# Patient Record
Sex: FEMALE | Race: BLACK OR AFRICAN AMERICAN | Employment: FULL TIME | URBAN - METROPOLITAN AREA
[De-identification: names, ages, dates, MRNs, and addresses within clinical notes are randomized per-mention and may not be internally consistent; named-entity substitution may affect disease eponyms.]

---

## 2017-02-20 ENCOUNTER — OFFICE VISIT (OUTPATIENT)
Dept: FAMILY MEDICINE CLINIC | Age: 52
End: 2017-02-20

## 2017-02-20 VITALS
RESPIRATION RATE: 18 BRPM | SYSTOLIC BLOOD PRESSURE: 130 MMHG | DIASTOLIC BLOOD PRESSURE: 72 MMHG | HEART RATE: 65 BPM | BODY MASS INDEX: 37.27 KG/M2 | OXYGEN SATURATION: 98 % | WEIGHT: 231.9 LBS | HEIGHT: 66 IN | TEMPERATURE: 98 F

## 2017-02-20 DIAGNOSIS — E11.9 CONTROLLED TYPE 2 DIABETES MELLITUS WITHOUT COMPLICATION, WITHOUT LONG-TERM CURRENT USE OF INSULIN (HCC): Primary | ICD-10-CM

## 2017-02-20 DIAGNOSIS — M54.9 OTHER CHRONIC BACK PAIN: ICD-10-CM

## 2017-02-20 DIAGNOSIS — G89.29 OTHER CHRONIC BACK PAIN: ICD-10-CM

## 2017-02-20 DIAGNOSIS — E66.9 OBESITY (BMI 35.0-39.9 WITHOUT COMORBIDITY): ICD-10-CM

## 2017-02-20 NOTE — MR AVS SNAPSHOT
Visit Information Date & Time Provider Department Dept. Phone Encounter #  
 2/20/2017  8:00 AM Gianna Nicole MD [address] [place] [address] [id number] Follow-up Instructions Return in about 2 weeks (around 3/6/2017). Upcoming Health Maintenance Date Due Hepatitis C Screening 1965 FOOT EXAM Q1 12/26/1975 MICROALBUMIN Q1 12/26/1975 EYE EXAM RETINAL OR DILATED Q1 12/26/1975 Pneumococcal 19-64 Medium Risk (1 of 1 - PPSV23) 12/26/1984 DTaP/Tdap/Td series (1 - Tdap) 12/26/1986 FOBT Q 1 YEAR AGE 50-75 12/26/2015 INFLUENZA AGE 9 TO ADULT 8/1/2016 HEMOGLOBIN A1C Q6M 2/19/2017 PAP AKA CERVICAL CYTOLOGY 8/12/2017 LIPID PANEL Q1 8/19/2017 BREAST CANCER SCRN MAMMOGRAM 2/25/2018 Allergies as of 2/20/2017  Review Complete On: 2/20/2017 By: Gianna Nicole MD  
  
 Severity Noted Reaction Type Reactions Apple Medium 05/21/2015    Itching Current Immunizations  Reviewed on 12/2/2015 Name Date Influenza Vaccine (Quad) PF 12/2/2015 Not reviewed this visit You Were Diagnosed With   
  
 Codes Comments Obesity (BMI 35.0-39.9 without comorbidity) ([facility]    -  Primary ICD-10-CM: I78.3 ICD-9-CM: 278.00 Vitals BP Pulse Temp Resp Height(growth percentile) Weight(growth percentile) 130/72 (BP 1 Location: Right arm, BP Patient Position: Sitting) 65 98 °F (36.7 °C) (Oral) 18 5' 6\" (1.676 m) 231 lb 14.4 oz (105.2 kg) SpO2 BMI OB Status Smoking Status 98% 37.43 kg/m2 Menopause Former Smoker BMI and BSA Data Body Mass Index Body Surface Area  
 37.43 kg/m 2 2.21 m 2 Preferred Pharmacy Pharmacy Name Phone [id number] [place], [address] AT [place] [phone] Your Updated Medication List  
  
   
This list is accurate as of: 2/20/17  9:14 AM.  Always use your most recent med list.  
  
  
  
  
 albuterol 90 mcg/actuation inhaler Commonly known as:  PROVENTIL HFA, VENTOLIN HFA, PROAIR HFA Take 2 Puffs by inhalation every four (4) hours as needed for Wheezing. azithromycin 250 mg tablet Commonly known as:  Juanchonetta Plum Take two tablets today then one tablet daily BD INSULIN PEN NEEDLE UF SHORT 31 gauge x 5/16\" Ndle Generic drug:  Insulin Needles (Disposable) USE WITH SAXENDA  
  
 clonazePAM 0.5 mg tablet Commonly known as:  KlonoPIN  
  
 diclofenac 1 % Gel Commonly known as:  VOLTAREN Apply 4 g to affected area four (4) times daily. liraglutide 3 mg/0.5 mL (18 mg/3 mL) pen Commonly known as:  SAXENDA  
0.1 mL by SubCUTAneous route daily. topiramate 25 mg tablet Commonly known as:  TOPAMAX We Performed the Following REFERRAL TO PSYCHOLOGY [UPQ25 Custom] Comments:  
 [facility]beca Sal[facility] [address], [address] [address] Phone: [phone] 
 
eval and treat for weight loss eating behavioral change Follow-up Instructions Return in about 2 weeks (around 3/6/2017). Referral Information Referral ID Referred By Referred To  
  
 [id number] [place] Not Available Visits Status Start Date End Date 1 New Request 2/20/17 2/20/18 If your referral has a status of pending review or denied, additional information will be sent to support the outcome of this decision. Introducing [facility]! Vianca Eldridge introduces [employer] patient portal. Now you can access parts of your medical record, email your doctor's office, and request medication refills online. 1. In your internet browser, go to [email or web address] 2. Click on the First Time User? Click Here link in the Sign In box. You will see the New Member Sign Up page. 3. Enter your [employer] Access Code exactly as it appears below.  You will not need to use this code after youve completed the sign-up process. If you do not sign up before the expiration date, you must request a new code. · [employer] Access Code: [email or web address] Expires: 5/21/2017  9:14 AM 
 
4. Enter the last four digits of your Social Security Number (xxxx) and Date of Birth (mm/dd/yyyy) as indicated and click Submit. You will be taken to the next sign-up page. 5. Create a [employer] ID. This will be your [employer] login ID and cannot be changed, so think of one that is secure and easy to remember. 6. Create a [employer] password. You can change your password at any time. 7. Enter your Password Reset Question and Answer. This can be used at a later time if you forget your password. 8. Enter your e-mail address. You will receive e-mail notification when new information is available in [address] 9. Click Sign Up. You can now view and download portions of your medical record. 10. Click the Download Summary menu link to download a portable copy of your medical information. If you have questions, please visit the Frequently Asked Questions section of the [employer] website. Remember, [employer] is NOT to be used for urgent needs. For medical emergencies, dial 911. Now available from your iPhone and Android! Please provide this summary of care documentation to your next provider. Your primary care clinician is listed as Dylan Peck. If you have any questions after today's visit, please call [phone].

## 2017-02-20 NOTE — PROGRESS NOTES
1. Have you been to the ER, urgent care clinic since your last visit? Hospitalized since your last visit? No    2. Have you seen or consulted any other health care providers outside of the [address] [place] [address] since your last visit? Include any pap smears or colon screening.  No     Chief Complaint   Patient presents with    Weight Management

## 2017-02-20 NOTE — PROGRESS NOTES
Weight Loss Progress Note: follow up Physician Visit      Bakari Arrieta is a 46 y.o. female with BMI 37.43   who is here for her follow up  Evaluation for the medical bariatric care. She is taking the saxenda at 3 mg daily. She denies nausea. She thinks it does make her nibble less. She has not lost     CC: I want to be healthier    Weight History  Current weight 231 and BMI is Body mass index is 37.43 kg/(m^2). Goal weight 180   Highest weight   (See weight gain time line scanned into media section of chart)        Significant Medical History    Update on sleep apnea and  CPAP has not started cpap    Ever had bariatric surgery: no    Pregnant or planning on becoming pregnant w/in 6 months: no     If female:     Significant Psychosocial History   Any history of drug abuse or dependence: no  Current Major Lifestyle Changes: no  Any potential unsupportive people: no          Social History  Social History   Substance Use Topics    Smoking status: Former Smoker     Packs/day: 0.10    Smokeless tobacco: Never Used    Alcohol use Yes      Comment: socially     How many times a week do you eat out?  2-3    Do you drink any EtOH?  no   If so, how much? Do you have upcoming any travel in the next 6 weeks?  no   If so, what do you have planned? Exercise  How many days a week do you currently exercise?   5 days  Have you ever been told by a physician not to exercise?  no      Objective  Visit Vitals    /72 (BP 1 Location: Right arm, BP Patient Position: Sitting)    Pulse 65    Temp 98 °F (36.7 °C) (Oral)    Resp 18    Ht 5' 6\" (1.676 m)    Wt 231 lb 14.4 oz (105.2 kg)    SpO2 98%    BMI 37.43 kg/m2       Bicep - (right ) circumference  Waist Circumference: See Weight Management Doc Flowsheet  Neck Circumference: See Weight Management Doc Flowsheet  Percent Body Fat: See Weight Management Doc Flowsheet  Weight Metrics 2017 2016 10/19/2016 10/19/2016 2016 9/28/2016 9/9/2016   Weight 231 lb 14.4 oz - - 228 lb 14.4 oz - 229 lb 11.2 oz -   Neck Circ (inches) - 15 15 - 15.5 - 14   Waist Measure Inches - 43.5 43 - 41.5 - 43   Exercise Mins/week - - 150 - - - 0   Body Fat % - 42.6 42.4 - 42.6 - 42   BMI 37.43 kg/m2 - - 36.95 kg/m2 - 37.07 kg/m2 -         Labs: next    Review of Systems  Complete ROS negative except where noted above      Physical Exam    Vital Signs Reviewed  Weight Management Metrics Reviewed    Vital Signs Reviewed  Appearance: Obese, A&O x 3, NAD  HEENT:  NC/AT, EOMI, PERRL, No scleral icterus, malampatti score:4  Skin:    Skin tags - no   Acanthosis Nigricans - no  Neck:  No nodes, thyromegaly   Heart:  RRR without M/R/G  Lungs:  CTAB, no rhonchi, rales, or wheezes with good air exchange   Abdomen:  Non-tender, pos bowel sounds; hepatomegaly -   Ext:  No C/C/E        Assessment & Plan  Iam Connell was seen today for weight management. Diagnoses and all orders for this visit:    Controlled type 2 diabetes mellitus without complication, without long-term current use of insulin ([address]  On saxenda for appetite and it is controlling the blood sugar too  Obesity (BMI 35.0-39.9 without comorbidity) (HCC)  -     REFERRAL TO PSYCHOLOGY  Diet:green smoothie for 10 days  No alcohol during the weight loss and will add back a small amt later. Activity: p90 x 4-5 days a week    Medication: cont saxend  Get the cpap. Call the sleep clinic to check on it  She wanted to discuss surgical options which we did. The conclusion today was I referred her to Dr Jalen Raymundo for behavioral care to address the health behaviors. Other chronic back pain        Based on his history and exam, Jocelynn Jain is a good candidate for the  [facility]         There are no Patient Instructions on file for this visit. Follow-up Disposition:  Return in about 2 weeks (around 3/6/2017).     Over 50% of the 30 minutes face to face time with Iam Connell consisted of counseling & coordinating and/or discussing treatment plans in reference to her obesity The primary encounter diagnosis was Controlled type 2 diabetes mellitus without complication, without long-term current use of insulin ([address] Diagnoses of Obesity (BMI 35.0-39.9 without comorbidity) ([address] and Other chronic back pain were also pertinent to this visit.   The patient verbalizes understanding and agrees with the plan of care    Patient has the advanced directives  booklet to review

## 2017-03-17 ENCOUNTER — OFFICE VISIT (OUTPATIENT)
Dept: FAMILY MEDICINE CLINIC | Age: 52
End: 2017-03-17

## 2017-03-17 VITALS
WEIGHT: 233 LBS | HEART RATE: 73 BPM | DIASTOLIC BLOOD PRESSURE: 78 MMHG | BODY MASS INDEX: 37.45 KG/M2 | HEIGHT: 66 IN | TEMPERATURE: 98 F | SYSTOLIC BLOOD PRESSURE: 118 MMHG | RESPIRATION RATE: 18 BRPM | OXYGEN SATURATION: 97 %

## 2017-03-17 DIAGNOSIS — Z11.59 NEED FOR HEPATITIS C SCREENING TEST: ICD-10-CM

## 2017-03-17 DIAGNOSIS — Z23 ENCOUNTER FOR IMMUNIZATION: ICD-10-CM

## 2017-03-17 DIAGNOSIS — G47.33 OSA (OBSTRUCTIVE SLEEP APNEA): ICD-10-CM

## 2017-03-17 DIAGNOSIS — E66.9 OBESITY (BMI 30-39.9): ICD-10-CM

## 2017-03-17 DIAGNOSIS — E11.9 CONTROLLED TYPE 2 DIABETES MELLITUS WITHOUT COMPLICATION, WITHOUT LONG-TERM CURRENT USE OF INSULIN (HCC): Primary | ICD-10-CM

## 2017-03-17 NOTE — PROGRESS NOTES
1. Have you been to the ER, urgent care clinic since your last visit? Hospitalized since your last visit? No    2. Have you seen or consulted any other health care providers outside of the [address] [place] [address] since your last visit? Include any pap smears or colon screening.  No   Chief Complaint   Patient presents with    Follow-up

## 2017-03-17 NOTE — MR AVS SNAPSHOT
Visit Information Date & Time Provider Department Dept. Phone Encounter #  
 3/17/2017  8:00 AM Cheng Chen MD [address] [place] [address] [id number] Follow-up Instructions Return in about 3 months (around 6/17/2017). Upcoming Health Maintenance Date Due  
 FOOT EXAM Q1 12/26/1975 EYE EXAM RETINAL OR DILATED Q1 12/26/1975 DTaP/Tdap/Td series (1 - Tdap) 12/26/1986 FOBT Q 1 YEAR AGE 50-75 12/26/2015 PAP AKA CERVICAL CYTOLOGY 8/12/2017 LIPID PANEL Q1 8/19/2017 HEMOGLOBIN A1C Q6M 9/17/2017 BREAST CANCER SCRN MAMMOGRAM 2/25/2018 MICROALBUMIN Q1 3/17/2018 Allergies as of 3/17/2017  Review Complete On: 3/17/2017 By: Cheng Chen MD  
  
 Severity Noted Reaction Type Reactions Apple Medium 05/21/2015    Itching Current Immunizations  Reviewed on 12/2/2015 Name Date Influenza Vaccine (Quad) PF 12/2/2015 Pneumococcal Polysaccharide (PPSV-23)  Incomplete Not reviewed this visit You Were Diagnosed With   
  
 Codes Comments Encounter for immunization    -  Primary ICD-10-CM: L25 ICD-9-CM: V03.89 Controlled type 2 diabetes mellitus without complication, without long-term current use of insulin ([facility]     ICD-10-CM: E11.9 ICD-9-CM: 250.00 Obesity (BMI 30-39. 9)     ICD-10-CM: E66.9 ICD-9-CM: 278.00   
 BREE (obstructive sleep apnea)     ICD-10-CM: G47.33 
ICD-9-CM: 327.23 Need for hepatitis C screening test     ICD-10-CM: Z11.59 
ICD-9-CM: V73.89 Vitals BP Pulse Temp Resp Height(growth percentile) Weight(growth percentile) 118/78 (BP 1 Location: Right arm, BP Patient Position: Sitting) 73 98 °F (36.7 °C) (Oral) 18 5' 6\" (1.676 m) 233 lb (105.7 kg) SpO2 BMI OB Status Smoking Status 97% 37.61 kg/m2 Menopause Former Smoker BMI and BSA Data Body Mass Index Body Surface Area  
 37.61 kg/m 2 2.22 m 2 Preferred Pharmacy Pharmacy Name Phone [id number] [address] AT [address] [phone] Your Updated Medication List  
  
   
This list is accurate as of: 3/17/17  8:37 AM.  Always use your most recent med list.  
  
  
  
  
 albuterol 90 mcg/actuation inhaler Commonly known as:  PROVENTIL HFA, VENTOLIN HFA, PROAIR HFA Take 2 Puffs by inhalation every four (4) hours as needed for Wheezing. azithromycin 250 mg tablet Commonly known as:  Prasad Caterina Take two tablets today then one tablet daily BD INSULIN PEN NEEDLE UF SHORT 31 gauge x 5/16\" Ndle Generic drug:  Insulin Needles (Disposable) USE WITH SAXENDA  
  
 clonazePAM 0.5 mg tablet Commonly known as:  KlonoPIN  
  
 diclofenac 1 % Gel Commonly known as:  VOLTAREN Apply 4 g to affected area four (4) times daily. liraglutide 3 mg/0.5 mL (18 mg/3 mL) pen Commonly known as:  SAXENDA  
0.1 mL by SubCUTAneous route daily. topiramate 25 mg tablet Commonly known as:  TOPAMAX We Performed the Following HEMOGLOBIN A1C WITH EAG [19293 CPT(R)] HEPATITIS C AB [49844 CPT(R)] MICROALBUMIN, UR, RAND [id number] CPT(R)] PNEUMOCOCCAL POLYSACCHARIDE VACCINE, 23-VALENT, ADULT OR IMMUNOSUPPRESSED PT DOSE, [94742 CPT(R)] Follow-up Instructions Return in about 3 months (around 6/17/2017). Introducing [facility]! Paulette Mcgregor introduces [employer] patient portal. Now you can access parts of your medical record, email your doctor's office, and request medication refills online. 1. In your internet browser, go to [email or web address] 2. Click on the First Time User? Click Here link in the Sign In box. You will see the New Member Sign Up page. 3. Enter your [employer] Access Code exactly as it appears below. You will not need to use this code after youve completed the sign-up process. If you do not sign up before the expiration date, you must request a new code. · [employer] Access Code: [email or web address] Expires: 5/21/2017 10:14 AM 
 
4. Enter the last four digits of your Social Security Number (xxxx) and Date of Birth (mm/dd/yyyy) as indicated and click Submit. You will be taken to the next sign-up page. 5. Create a [employer] ID. This will be your [employer] login ID and cannot be changed, so think of one that is secure and easy to remember. 6. Create a [employer] password. You can change your password at any time. 7. Enter your Password Reset Question and Answer. This can be used at a later time if you forget your password. 8. Enter your e-mail address. You will receive e-mail notification when new information is available in [address] 9. Click Sign Up. You can now view and download portions of your medical record. 10. Click the Download Summary menu link to download a portable copy of your medical information. If you have questions, please visit the Frequently Asked Questions section of the [employer] website. Remember, [employer] is NOT to be used for urgent needs. For medical emergencies, dial 911. Now available from your iPhone and Android! Please provide this summary of care documentation to your next provider. Your primary care clinician is listed as Michael Hansen. If you have any questions after today's visit, please call [phone].

## 2017-03-17 NOTE — PROGRESS NOTES
Chief Complaint   Patient presents with    Follow-up     she is a 46y.o. year old female who presents for evalution. This is follow up from ER . Her BP was 159/? Mliss De La Cruz She had BP yesterday was 140/86  She denies chest pain or sob. She has a pinching feeling in the back of her head. That is why she went to the ER, also c/o feeling tired. She is using the cpap but she does not wear it all night because the mask is uncomfortable        Reviewed PmHx, RxHx, FmHx, SocHx, AllgHx and updated and dated in the chart. Patient Active Problem List    Diagnosis    Controlled type 2 diabetes mellitus without complication, without long-term current use of insulin ([address]    BREE (obstructive sleep apnea)    Diabetes mellitus type 2, controlled ([address]    Obesity (BMI 30-39. 9)    Prediabetes    New daily persistent headache    GERD (gastroesophageal reflux disease)    Chronic back pain    Overweight(278.02)    Fatigue       Nurse notes were reviewed and copied and are correct  Review of Systems - negative except as listed above in the HPI    Objective:     Vitals:    03/17/17 0800   BP: 118/78   Pulse: 73   Resp: 18   Temp: 98 °F (36.7 °C)   TempSrc: Oral   SpO2: 97%   Weight: 233 lb (105.7 kg)   Height: 5' 6\" (1.676 m)       Physical Examination: General appearance - alert, well appearing, and in no distress  Mental status - alert, oriented to person, place, and time  Chest - clear to auscultation, no wheezes, rales or rhonchi, symmetric air entry  Heart - normal rate, regular rhythm, normal S1, S2, no murmurs, rubs, clicks or gallops  Musculoskeletal - no joint tenderness, deformity or swelling  Extremities - peripheral pulses normal, no pedal edema, no clubbing or cyanosis  Skin - normal coloration and turgor, no rashes, no suspicious skin lesions noted      Assessment/ Plan:   Iam Connell was seen today for follow-up.     Diagnoses and all orders for this visit:    Encounter for immunization  -     Pneumococcal polysaccharide vaccine, 23-valent, adult or immunosuppressed pt dose  tdap   Controlled type 2 diabetes mellitus without complication, without long-term current use of insulin (HCC)  -     HEMOGLOBIN A1C WITH EAG  -     MICROALBUMIN, UR, RAND  Using for both appetite and blood glucose. Not drinking the wine at night now. Obesity (BMI 30-39. 9)  Has not been exercising regularly but has been tryingto eat healthy. She thinks the saxenda is helping to control the appetite  BREE (obstructive sleep apnea)    Need for hepatitis C screening test  -     HEPATITIS C AB     Follow-up Disposition:  Return in about 3 months (around 6/17/2017). ICD-10-CM ICD-9-CM    1. Controlled type 2 diabetes mellitus without complication, without long-term current use of insulin (HCC) E11.9 250.00 HEMOGLOBIN A1C WITH EAG      MICROALBUMIN, UR, RAND   2. Encounter for immunization Z23 V03.89 PNEUMOCOCCAL POLYSACCHARIDE VACCINE, 23-VALENT, ADULT OR IMMUNOSUPPRESSED PT DOSE,      TETANUS, DIPHTHERIA TOXOIDS AND ACELLULAR PERTUSSIS VACCINE (TDAP), IN INDIVIDS. >=7, IM   3. Obesity (BMI 30-39. 9) E66.9 278.00    4. BREE (obstructive sleep apnea) G47.33 327.23    5. Need for hepatitis C screening test Z11.59 V73.89 HEPATITIS C AB       I have discussed the diagnosis with the patient and the intended plan as seen in the above orders. The patient has received an after-visit summary and questions were answered concerning future plans. Medication Side Effects and Warnings were discussed with patient: yes  Patient Labs were reviewed and or requested: yes  Patient Past Records were reviewed and or requested: yes        There are no Patient Instructions on file for this visit.     The patient verbalizes understanding and agrees with the plan of care        Patient has the advanced directives booklet to review

## 2017-03-22 LAB
EST. AVERAGE GLUCOSE BLD GHB EST-MCNC: 126 MG/DL
HBA1C MFR BLD: 6 % (ref 4.8–5.6)
HCV AB S/CO SERPL IA: <0.1 S/CO RATIO (ref 0–0.9)
MICROALBUMIN UR-MCNC: 3.9 UG/ML

## 2017-06-22 DIAGNOSIS — H01.005 BLEPHARITIS OF LEFT LOWER EYELID, UNSPECIFIED TYPE: Primary | ICD-10-CM

## 2017-06-22 RX ORDER — ERYTHROMYCIN 5 MG/G
OINTMENT OPHTHALMIC
Qty: 2 G | Refills: 0 | Status: SHIPPED | OUTPATIENT
Start: 2017-06-22 | End: 2018-01-28

## 2017-06-22 NOTE — PROGRESS NOTES
C/o left lower eyelid swollen and has a bump on it. She does not want to drive so she is not coming in. She wants something for the eye. Her  had a similar problem last week  I sent Rx for erythromycin ointment.  They are probably passing bacterial conjunctivitis around the house

## 2017-07-25 ENCOUNTER — OFFICE VISIT (OUTPATIENT)
Dept: BARIATRICS/WEIGHT MGMT | Age: 52
End: 2017-07-25

## 2017-07-25 DIAGNOSIS — E66.9 OBESITY (BMI 30-39.9): Primary | ICD-10-CM

## 2017-07-31 NOTE — PROGRESS NOTES
Patient attended a Medically Supervised Weight Loss New Patient Orientation today where we discussed:  - New Direction Very Low Calorie Diet details  - Medical Supervision  - Nutrition education  - Cost  - Policies and compliance required for program enrollment. - Lab slip was given to patient with instructions for having them drawn. Patients initial consultation with physician is tentatively scheduled for:  Future Appointments  Date Time Provider Cecilia Stark   8/24/2017 8:00 AM Billy Miller MD [address] [place] [address] starting weight was documented as: There were no vitals taken for this visit. Patient attended a Medically Supervised Weight Loss New Patient Orientation today where we discussed:  - New Direction Very Low Calorie Diet details  - Medical Supervision  - Nutrition education  - Cost  - Policies and compliance required for program enrollment. - Lab slip was given to patient with instructions for having them drawn. Patients initial consultation with physician is tentatively scheduled for:  Future Appointments  Date Time Provider Cecilia Stark   8/24/2017 8:00 AM Billy Miller MD [address] [facility] starting weight was documented as: There were no vitals taken for this visit.

## 2017-08-24 ENCOUNTER — OFFICE VISIT (OUTPATIENT)
Dept: FAMILY MEDICINE CLINIC | Age: 52
End: 2017-08-24

## 2017-08-24 VITALS
HEIGHT: 66 IN | WEIGHT: 232 LBS | TEMPERATURE: 97.9 F | BODY MASS INDEX: 37.28 KG/M2 | RESPIRATION RATE: 17 BRPM | SYSTOLIC BLOOD PRESSURE: 115 MMHG | DIASTOLIC BLOOD PRESSURE: 79 MMHG | OXYGEN SATURATION: 98 % | HEART RATE: 69 BPM

## 2017-08-24 DIAGNOSIS — E66.9 OBESITY (BMI 30-39.9): ICD-10-CM

## 2017-08-24 DIAGNOSIS — G47.33 OSA (OBSTRUCTIVE SLEEP APNEA): ICD-10-CM

## 2017-08-24 DIAGNOSIS — M54.6 LEFT-SIDED THORACIC BACK PAIN, UNSPECIFIED CHRONICITY: ICD-10-CM

## 2017-08-24 DIAGNOSIS — G89.29 CHRONIC PAIN OF LEFT KNEE: ICD-10-CM

## 2017-08-24 DIAGNOSIS — M25.551 RIGHT HIP PAIN: ICD-10-CM

## 2017-08-24 DIAGNOSIS — M25.562 CHRONIC PAIN OF LEFT KNEE: ICD-10-CM

## 2017-08-24 DIAGNOSIS — E11.9 TYPE 2 DIABETES MELLITUS WITHOUT COMPLICATION, WITHOUT LONG-TERM CURRENT USE OF INSULIN (HCC): Primary | ICD-10-CM

## 2017-08-24 RX ORDER — LANCETS
EACH MISCELLANEOUS
Qty: 100 EACH | Refills: 3 | Status: SHIPPED | OUTPATIENT
Start: 2017-08-24 | End: 2018-01-05 | Stop reason: SDUPTHER

## 2017-08-24 NOTE — MR AVS SNAPSHOT
Visit Information Date & Time Provider Department Dept. Phone Encounter #  
 8/24/2017  8:00 AM Casey Guzman MD [address] [place] [address] [id number] Upcoming Health Maintenance Date Due  
 FOOT EXAM Q1 12/26/1975 EYE EXAM RETINAL OR DILATED Q1 12/26/1975 FOBT Q 1 YEAR AGE 50-75 12/26/2015 INFLUENZA AGE 9 TO ADULT 8/1/2017 PAP AKA CERVICAL CYTOLOGY 8/12/2017 LIPID PANEL Q1 8/19/2017 HEMOGLOBIN A1C Q6M 9/17/2017 MICROALBUMIN Q1 3/17/2018 BREAST CANCER SCRN MAMMOGRAM 6/15/2019 DTaP/Tdap/Td series (2 - Td) 3/17/2027 Allergies as of 8/24/2017  Review Complete On: 8/24/2017 By: Casey Guzman MD  
  
 Severity Noted Reaction Type Reactions Apple Medium 05/21/2015    Itching Current Immunizations  Reviewed on 12/2/2015 Name Date Influenza Vaccine (Quad) PF 12/2/2015 Pneumococcal Polysaccharide (PPSV-23) 3/17/2017 Tdap 3/17/2017 Not reviewed this visit You Were Diagnosed With   
  
 Codes Comments Type 2 diabetes mellitus without complication, without long-term current use of insulin (HCC)    -  Primary ICD-10-CM: E11.9 ICD-9-CM: 250.00 Obesity (BMI 30-39. 9)     ICD-10-CM: E66.9 ICD-9-CM: 278.00   
 BREE (obstructive sleep apnea)     ICD-10-CM: G47.33 
ICD-9-CM: 327.23 Left-sided thoracic back pain, unspecified chronicity     ICD-10-CM: M54.6 ICD-9-CM: 724.1 Chronic pain of left knee     ICD-10-CM: M25.562, G89.29 ICD-9-CM: 719.46, 338.29 Right hip pain     ICD-10-CM: M25.551 ICD-9-CM: 719.45 Vitals BP Pulse Temp Resp Height(growth percentile) Weight(growth percentile) 115/79 69 97.9 °F (36.6 °C) (Oral) 17 5' 6\" (1.676 m) 232 lb (105.2 kg) SpO2 BMI OB Status Smoking Status 98% 37.45 kg/m2 Menopause Former Smoker Vitals History BMI and BSA Data Body Mass Index Body Surface Area  
 37.45 kg/m 2 2.21 m 2 Preferred Pharmacy Pharmacy Name Phone [facility] [id number] AT [place] [place] [phone] Your Updated Medication List  
  
   
This list is accurate as of: 8/24/17  8:46 AM.  Always use your most recent med list.  
  
  
  
  
 albuterol 90 mcg/actuation inhaler Commonly known as:  PROVENTIL HFA, VENTOLIN HFA, PROAIR HFA Take 2 Puffs by inhalation every four (4) hours as needed for Wheezing. BD INSULIN PEN NEEDLE UF SHORT 31 gauge x 5/16\" Ndle Generic drug:  Insulin Needles (Disposable) USE WITH SAXENDA  
  
 clonazePAM 0.5 mg tablet Commonly known as:  KlonoPIN  
  
 diclofenac 1 % Gel Commonly known as:  VOLTAREN  
APPLY 4 GRAMS TO AFFECTED AREA 4 TIMES A DAY  
  
 erythromycin ophthalmic ointment Commonly known as:  ILOTYCIN Apply small amount to left eye four times a day for 7 days  Indications: BACTERIAL BLEPHARITIS Lancets Misc E11.9 Use to check blood glucose daily  
  
 liraglutide 3 mg/0.5 mL (18 mg/3 mL) pen Commonly known as:  SAXENDA  
0.1 mL by SubCUTAneous route daily. topiramate 25 mg tablet Commonly known as:  TOPAMAX Prescriptions Printed Refills Lancets misc 3 Sig: E11.9 Use to check blood glucose daily Class: Print We Performed the Following HEMOGLOBIN A1C WITH EAG [10280 CPT(R)] LIPID PANEL [19753 CPT(R)] METABOLIC PANEL, COMPREHENSIVE [04303 CPT(R)] Introducing [facility]! Clara Childress introduces [employer] patient portal. Now you can access parts of your medical record, email your doctor's office, and request medication refills online. 1. In your internet browser, go to [email or web address] 2. Click on the First Time User? Click Here link in the Sign In box. You will see the New Member Sign Up page. 3. Enter your [employer] Access Code exactly as it appears below. You will not need to use this code after youve completed the sign-up process.  If you do not sign up before the expiration date, you must request a new code. · [email or web address] Access Code: [email or web address] Expires: 11/22/2017  8:45 AM 
 
4. Enter the last four digits of your Social Security Number (xxxx) and Date of Birth (mm/dd/yyyy) as indicated and click Submit. You will be taken to the next sign-up page. 5. Create a [email or web address] ID. This will be your [email or web address] login ID and cannot be changed, so think of one that is secure and easy to remember. 6. Create a [email or web address] password. You can change your password at any time. 7. Enter your Password Reset Question and Answer. This can be used at a later time if you forget your password. 8. Enter your e-mail address. You will receive e-mail notification when new information is available in [address] 9. Click Sign Up. You can now view and download portions of your medical record. 10. Click the Download Summary menu link to download a portable copy of your medical information. If you have questions, please visit the Frequently Asked Questions section of the [email or web address] website. Remember, [email or web address] is NOT to be used for urgent needs. For medical emergencies, dial 911. Now available from your iPhone and Android! Please provide this summary of care documentation to your next provider. Your primary care clinician is listed as Kade Paz. If you have any questions after today's visit, please call [phone].

## 2017-08-24 NOTE — PROGRESS NOTES
1. Have you been to the ER, urgent care clinic since your last visit? Hospitalized since your last visit? No    2. Have you seen or consulted any other health care providers outside of the [address] [place] [address] since your last visit? Include any pap smears or colon screening.  No       Chief Complaint   Patient presents with    Complete Physical

## 2017-08-24 NOTE — PROGRESS NOTES
Chief Complaint   Patient presents with    Complete Physical     she is a 46y.o. year old female who presents for evalution. Here for a physical. She has pain off and on in the left knee . A few weeks ago it was hurting so bad she could barely climb the steps in her house to get to her bedroom. When it flares the pain is mostly with weight bearing but it also hurt with sitting. Doing something active like taking a walk or going dancing with set off the pain. When it flares it takes at least 3 weeks before it stops hurting. She uses voltaren gel when it flares. Things like family events are a problem for her because it requires her to be on her feet a loyt and this sets off the knee pain every time. It gets as severe as 8/10, right now it is not hurting. She notes that the left knee pain is causing the right hip to hurt. She puts her weight on the right side when the left knee flares. The hip hurts a lot at night after walking around a lot in the daytime. She takes advil or tylenol and that usually helps. DM  She has not been checking the blood sugars  She is taking the saxenda wh covers the weight and the blood sugar  BREE  Using the cpap. Sleeping much better. Not using caffaine    Back pain  Still having off and on back pain, in the midthoracic area. She uses the voltaren gel when it flares which helps a little bit. when it flares. Washing dishes and standing to do her house work becomes very difficult. Reviewed PmHx, RxHx, FmHx, SocHx, AllgHx and updated and dated in the chart. Patient Active Problem List    Diagnosis    Controlled type 2 diabetes mellitus without complication, without long-term current use of insulin ([address]    BREE (obstructive sleep apnea)    Diabetes mellitus type 2, controlled ([address]    Obesity (BMI 30-39. 9)    Prediabetes    New daily persistent headache    GERD (gastroesophageal reflux disease)    Chronic back pain    Overweight    Fatigue       Nurse notes were reviewed and copied and are correct  Review of Systems - negative except as listed above in the HPI    Objective:     Vitals:    08/24/17 0808   BP: 115/79   Pulse: 69   Resp: 17   Temp: 97.9 °F (36.6 °C)   TempSrc: Oral   SpO2: 98%   Weight: 232 lb (105.2 kg)   Height: 5' 6\" (1.676 m)       Physical Examination: General appearance - alert, well appearing, and in no distress  Mental status - alert, oriented to person, place, and time  Eyes - pupils equal and reactive, extraocular eye movements intact  Ears - bilateral TM's and external ear canals normal  Mouth - mucous membranes moist, pharynx normal without lesions  Neck - supple, no significant adenopathy  Lymphatics - no palpable lymphadenopathy, no hepatosplenomegaly  Chest - clear to auscultation, no wheezes, rales or rhonchi, symmetric air entry  Heart - normal rate, regular rhythm, normal S1, S2, no murmurs, rubs, clicks or gallops  Abdomen - soft, nontender, nondistended, no masses or organomegaly  Neurological - alert, oriented, normal speech, no focal findings or movement disorder noted  Musculoskeletal - no joint tenderness, deformity or swelling  Extremities - peripheral pulses normal, no pedal edema, no clubbing or cyanosis  Skin - normal coloration and turgor, no rashes, no suspicious skin lesions noted      Assessment/ Plan:   Diagnoses and all orders for this visit:    1. Type 2 diabetes mellitus without complication, without long-term current use of insulin (HCC)  -     Lancets misc; E11.9  Use to check blood glucose daily  -     HEMOGLOBIN A1C WITH EAG  -     LIPID PANEL  -     METABOLIC PANEL, COMPREHENSIVE    2. Obesity (BMI 30-39. 9)  See below  3. BREE (obstructive sleep apnea)  Doing well on the cpap. I believe the knee pain and the hip pain are linked to weight gain which leads to BREE. She is still trying to find ways to exercise. She has used the pool and also plans to try getting a . She is working on the [employer] currently.   4. Left-sided thoracic back pain, unspecified chronicity  Probably as a result of how she holds her body to protect the left knee  5. Chronic pain of left knee  This started in the [employer]. Has been off and on since then  6. Right hip pain  The pain is probably linked to the left knee pain. She is putting more work on the right side to guard the left knee. Follow-up Disposition:  Return in about 2 weeks (around 9/7/2017) for weight loss. ICD-10-CM ICD-9-CM    1. Type 2 diabetes mellitus without complication, without long-term current use of insulin (HCC) E11.9 250.00 Lancets misc      HEMOGLOBIN A1C WITH EAG      LIPID PANEL      METABOLIC PANEL, COMPREHENSIVE   2. Obesity (BMI 30-39. 9) E66.9 278.00    3. BREE (obstructive sleep apnea) G47.33 327.23    4. Left-sided thoracic back pain, unspecified chronicity M54.6 724.1    5. Chronic pain of left knee M25.562 719.46     G89.29 338.29    6. Right hip pain M25.551 719.45        I have discussed the diagnosis with the patient and the intended plan as seen in the above orders. The patient has received an after-visit summary and questions were answered concerning future plans. Medication Side Effects and Warnings were discussed with patient: yes  Patient Labs were reviewed and or requested: yes  Patient Past Records were reviewed and or requested: yes        There are no Patient Instructions on file for this visit.     The patient verbalizes understanding and agrees with the plan of care        Patient has the advanced directives booklet to review

## 2017-08-25 LAB
ALBUMIN SERPL-MCNC: 4.1 G/DL (ref 3.5–5.5)
ALBUMIN/GLOB SERPL: 1.4 {RATIO} (ref 1.2–2.2)
ALP SERPL-CCNC: 100 IU/L (ref 39–117)
ALT SERPL-CCNC: 12 IU/L (ref 0–32)
AST SERPL-CCNC: 14 IU/L (ref 0–40)
BILIRUB SERPL-MCNC: 0.2 MG/DL (ref 0–1.2)
BUN SERPL-MCNC: 13 MG/DL (ref 6–24)
BUN/CREAT SERPL: 20 (ref 9–23)
CALCIUM SERPL-MCNC: 9.4 MG/DL (ref 8.7–10.2)
CHLORIDE SERPL-SCNC: 103 MMOL/L (ref 96–106)
CHOLEST SERPL-MCNC: 186 MG/DL (ref 100–199)
CO2 SERPL-SCNC: 24 MMOL/L (ref 18–29)
CREAT SERPL-MCNC: 0.65 MG/DL (ref 0.57–1)
EST. AVERAGE GLUCOSE BLD GHB EST-MCNC: 126 MG/DL
GLOBULIN SER CALC-MCNC: 3 G/DL (ref 1.5–4.5)
GLUCOSE SERPL-MCNC: 98 MG/DL (ref 65–99)
HBA1C MFR BLD: 6 % (ref 4.8–5.6)
HDLC SERPL-MCNC: 62 MG/DL
INTERPRETATION, 910389: NORMAL
LDLC SERPL CALC-MCNC: 108 MG/DL (ref 0–99)
Lab: NORMAL
POTASSIUM SERPL-SCNC: 4.5 MMOL/L (ref 3.5–5.2)
PROT SERPL-MCNC: 7.1 G/DL (ref 6–8.5)
SODIUM SERPL-SCNC: 142 MMOL/L (ref 134–144)
TRIGL SERPL-MCNC: 81 MG/DL (ref 0–149)
VLDLC SERPL CALC-MCNC: 16 MG/DL (ref 5–40)

## 2017-08-28 NOTE — PROGRESS NOTES
The blood sugar control looks pretty good. The average blood sugar is 126. I would really like to get that down in the 5s. The cholesterol is just a tiny bit high. This will be fine after a few months of a little more activity.  More activity will also get the cholesterol and blood sugar in the ideal range  The liver and kidney are normal

## 2017-08-28 NOTE — PROGRESS NOTES
Spoke with pt and advised of lab results/recommendations. Pt verbalized understanding and no further questions.

## 2017-09-07 DIAGNOSIS — E66.9 OBESITY (BMI 35.0-39.9 WITHOUT COMORBIDITY): ICD-10-CM

## 2017-09-07 DIAGNOSIS — E11.9 CONTROLLED TYPE 2 DIABETES MELLITUS WITHOUT COMPLICATION, WITHOUT LONG-TERM CURRENT USE OF INSULIN (HCC): Primary | ICD-10-CM

## 2017-09-07 RX ORDER — METFORMIN HYDROCHLORIDE 500 MG/1
500 TABLET ORAL 2 TIMES DAILY WITH MEALS
Qty: 60 TAB | Refills: 2 | Status: SHIPPED | OUTPATIENT
Start: 2017-09-07 | End: 2017-12-18 | Stop reason: SDUPTHER

## 2017-09-08 NOTE — PROGRESS NOTES
C/o facial odd sensations. Started since started saxenda.  Since the appetite has not been greatly controlled on saxenda, I will DC that and will switch to metformin for NIDDM and start contrave for appetite

## 2017-09-08 NOTE — PROGRESS NOTES
Spoke with pt and advised that [address] had been d/c'd and switched to Metformin which is ready at the pharmacy. Advised pt that on Monday when MD returns to office she will sign RX and it can be picked up along with co-pay card for Contrave. Pt verbalized understanding and no further questions at this time.

## 2017-10-11 DIAGNOSIS — R20.2 TINGLING: Primary | ICD-10-CM

## 2017-10-16 ENCOUNTER — OFFICE VISIT (OUTPATIENT)
Dept: NEUROLOGY | Age: 52
End: 2017-10-16

## 2017-10-16 VITALS
DIASTOLIC BLOOD PRESSURE: 78 MMHG | HEART RATE: 101 BPM | WEIGHT: 232 LBS | HEIGHT: 66 IN | SYSTOLIC BLOOD PRESSURE: 122 MMHG | OXYGEN SATURATION: 98 % | BODY MASS INDEX: 37.28 KG/M2

## 2017-10-16 DIAGNOSIS — R20.0 FACIAL NUMBNESS: Primary | ICD-10-CM

## 2017-10-16 LAB
1,25(OH)2D3 SERPL-MCNC: 44.4 PG/ML (ref 19.9–79.3)
FOLATE SERPL-MCNC: 8.3 NG/ML
TSH SERPL DL<=0.005 MIU/L-ACNC: 1.66 UIU/ML (ref 0.45–4.5)
VIT B12 SERPL-MCNC: 703 PG/ML (ref 211–946)

## 2017-10-16 NOTE — MR AVS SNAPSHOT
Visit Information Date & Time Provider Department Dept. Phone Encounter #  
 10/16/2017  9:00 AM [facility] [phone] [id number] Upcoming Health Maintenance Date Due  
 FOOT EXAM Q1 12/26/1975 EYE EXAM RETINAL OR DILATED Q1 12/26/1975 FOBT Q 1 YEAR AGE 50-75 12/26/2015 INFLUENZA AGE 9 TO ADULT 8/1/2017 PAP AKA CERVICAL CYTOLOGY 8/12/2017 HEMOGLOBIN A1C Q6M 2/24/2018 MICROALBUMIN Q1 3/17/2018 LIPID PANEL Q1 8/24/2018 BREAST CANCER SCRN MAMMOGRAM 6/15/2019 DTaP/Tdap/Td series (2 - Td) 3/17/2027 Allergies as of 10/16/2017  Review Complete On: 10/16/2017 By: Maira Guillermo LPN Severity Noted Reaction Type Reactions Apple Medium 05/21/2015    Itching Current Immunizations  Reviewed on 12/2/2015 Name Date Influenza Vaccine (Quad) PF 12/2/2015 Pneumococcal Polysaccharide (PPSV-23) 3/17/2017 Tdap 3/17/2017 Not reviewed this visit You Were Diagnosed With   
  
 Codes Comments Facial numbness    -  Primary ICD-10-CM: R20.0 ICD-9-CM: 322. 0 Vitals BP Pulse Height(growth percentile) Weight(growth percentile) SpO2 BMI  
 122/78 (BP 1 Location: Left arm, BP Patient Position: Sitting) (!) 101 5' 6\" (1.676 m) 232 lb (105.2 kg) 98% 37.45 kg/m2 OB Status Smoking Status Menopause Former Smoker BMI and BSA Data Body Mass Index Body Surface Area  
 37.45 kg/m 2 2.21 m 2 Preferred Pharmacy Pharmacy Name Phone [id number] [address] [facility]"" [phone] Your Updated Medication List  
  
   
This list is accurate as of: 10/16/17  9:57 AM.  Always use your most recent med list.  
  
  
  
  
 albuterol 90 mcg/actuation inhaler Commonly known as:  PROVENTIL HFA, VENTOLIN HFA, PROAIR HFA Take 2 Puffs by inhalation every four (4) hours as needed for Wheezing. BD INSULIN PEN NEEDLE UF SHORT 31 gauge x 5/16\" Ndle Generic drug:  Insulin Needles (Disposable) USE WITH SAXENDA  
  
 clonazePAM 0.5 mg tablet Commonly known as:  KlonoPIN  
  
 diclofenac 1 % Gel Commonly known as:  VOLTAREN  
APPLY 4 GRAMS TO AFFECTED AREA 4 TIMES A DAY  
  
 erythromycin ophthalmic ointment Commonly known as:  ILOTYCIN Apply small amount to left eye four times a day for 7 days  Indications: BACTERIAL BLEPHARITIS Lancets Misc E11.9 Use to check blood glucose daily  
  
 metFORMIN 500 mg tablet Commonly known as:  GLUCOPHAGE Take 1 Tab by mouth two (2) times daily (with meals). naltrexone-buPROPion 8-90 mg Tber ER tablet Commonly known as:  Chloe Gey Week 1 1 tab PO QAM, Week 2 1QAM 1QHS, Week 3 2QAM 1 QHS, Week 4 & beyond 2QAM 2QHS  
  
 topiramate 25 mg tablet Commonly known as:  TOPAMAX To-Do List   
 10/16/2017 Imaging:  MRI BRAIN W WO CONT Introducing \[facility]\"" & HEALTH SERVICES! Sergio Nair introduces [employer] patient portal. Now you can access parts of your medical record, email your doctor's office, and request medication refills online. 1. In your internet browser, go to [email or web address] 2. Click on the First Time User? Click Here link in the Sign In box. You will see the New Member Sign Up page. 3. Enter your [employer] Access Code exactly as it appears below. You will not need to use this code after youve completed the sign-up process. If you do not sign up before the expiration date, you must request a new code. · [employer] Access Code: [email or web address] Expires: 11/22/2017  8:45 AM 
 
4. Enter the last four digits of your Social Security Number (xxxx) and Date of Birth (mm/dd/yyyy) as indicated and click Submit. You will be taken to the next sign-up page. 5. Create a [employer] ID. This will be your [employer] login ID and cannot be changed, so think of one that is secure and easy to remember. 6. Create a [employer] password. You can change your password at any time. 7. Enter your Password Reset Question and Answer. This can be used at a later time if you forget your password. 8. Enter your e-mail address. You will receive e-mail notification when new information is available in [address] 9. Click Sign Up. You can now view and download portions of your medical record. 10. Click the Download Summary menu link to download a portable copy of your medical information. If you have questions, please visit the Frequently Asked Questions section of the [employer] website. Remember, [employer] is NOT to be used for urgent needs. For medical emergencies, dial 911. Now available from your iPhone and Android! Please provide this summary of care documentation to your next provider. Your primary care clinician is listed as Suhas Burnham. If you have any questions after today's visit, please call [phone].

## 2017-10-16 NOTE — PROGRESS NOTES
Name:  Sil Arevalo      :  1965    PCP:   Luis Gramajo MD      Referring:  As above  MRN:   [id number]    Chief Complaint:   Chief Complaint   Patient presents with    Numbness     head/face x 2 months       HISTORY OF PRESENT ILLNESS:     This is a 46 y.o. female who presents for evaluation of intermittent tingling of both sides of face. Describes that for the past 2-3 months has been having intermittent numbness (not facial pain) involving both sides of face that she notice when she wakes up in the morning. She says the sensation is present for 5-6 hours, and during it, she has some tingling sensation/ prickly sensation in random places on her scalp (not a headache per pt). At some point during the day the problem will go away. She's able to work through it, no difficulty concentrating or focusing. No c/o urinary or bowel incontinence, no prolonged weakness or numbness in any extremity, no gait unsteadiness, no diplopia, no eye pain, no vision loss. Complete Review of Systems: + fatigue, headache, joint pain, memory loss, muscle pain, muscle weakness, tinnitus, dyspnea, swallowing difficulty; otherwise as noted in HPI     Allergies   Allergen Reactions    Apple Itching     Past Medical History:   Diagnosis Date    Diabetes ([facility]      Current Outpatient Prescriptions   Medication Sig Dispense Refill    metFORMIN (GLUCOPHAGE) 500 mg tablet Take 1 Tab by mouth two (2) times daily (with meals).  60 Tab 2    naltrexone-buPROPion (CONTRAVE) 8-90 mg TbER ER tablet Week 1 1 tab PO QAM, Week 2 1QAM 1QHS, Week 3 2QAM 1 QHS, Week 4 & beyond 2QAM 2QHS 120 Tab 11    Lancets misc E11.9  Use to check blood glucose daily 100 Each 3    erythromycin (ILOTYCIN) ophthalmic ointment Apply small amount to left eye four times a day for 7 days  Indications: BACTERIAL BLEPHARITIS 2 g 0    diclofenac (VOLTAREN) 1 % gel APPLY 4 GRAMS TO AFFECTED AREA 4 TIMES A  g 6    albuterol (PROVENTIL HFA, VENTOLIN HFA, PROAIR HFA) 90 mcg/actuation inhaler Take 2 Puffs by inhalation every four (4) hours as needed for Wheezing. 1 Inhaler 1    clonazePAM (KLONOPIN) 0.5 mg tablet       BD INSULIN PEN NEEDLE UF SHORT 31 gauge x \" ndle USE WITH SAXENDA  11    topiramate (TOPAMAX) 25 mg tablet        Past Surgical History:   Procedure Laterality Date    HX  SECTION      x2    HX HERNIA REPAIR      HX TONSILLECTOMY       Family History   Problem Relation Age of Onset    Elevated Lipids Mother     Diabetes Father     Hypertension Father    Lupe Miners Elevated Lipids Sister     Thyroid Disease Sister     Hypertension Paternal Grandmother     Heart Disease Paternal Grandmother     Diabetes Paternal Grandmother     Hypertension Paternal Grandfather     Heart Disease Paternal Grandfather     Diabetes Paternal Grandfather     Cancer Maternal Grandmother      colon     Social History     Social History    Marital status:      Spouse name: N/A    Number of children: N/A    Years of education: N/A     Occupational History    Not on file. Social History Main Topics    Smoking status: Former Smoker     Packs/day: 0.10    Smokeless tobacco: Never Used    Alcohol use Yes      Comment: socially    Drug use: No    Sexual activity: Not on file     Other Topics Concern    Not on file     Social History Narrative       PHYSICAL EXAM  Vitals:    10/16/17 0920   BP: 122/78   BP 1 Location: Left arm   BP Patient Position: Sitting   Pulse: (!) 101   SpO2: 98%   Weight: 105.2 kg (232 lb)   Height: 5' 6\" (1.676 m)       General:  Alert, cooperative, NAD   Head:  Normocephalic, atraumatic. Eyes:  Conjunctivae/corneas clear   Lungs:  Heart:  Non labored breathing  Regular rate, rhythm   Extremities: No edema.    Skin: No rashes    Neurologic Exam       Language: normal  Memory:  Alert, oriented to person, place, situation    Cranial Nerves:  I: smell Not tested   II: visual fields Full to confrontation   II: pupils Equal, round, reactive to light   II: optic disc No papilledema   III,VII: ptosis none   III,IV,VI: extraocular muscles  normal   V: facial touch sensation  Normal LT, prick, temp V1-2-3 bilaterally   VII: facial muscle function  symmetric   VIII: hearing symmetric   IX: soft palate elevation  normal   XI: sternocleidomastoid strength 5/5   XII: tongue  midline      Motor: normal bulk, tone, strength in all exts  Sensory: intact to LT, PP, temp, vibration x 4 exts   Cerebellar: no rest, postural, or intention tremor  Normal FNF and H-Shin bilaterally  Reflexes: 2+ biceps, patellars, achilles (symmetric)   Plantar response: neutral bilaterally    Gait: normal gait including tandem  Romberg negative     No outside clinic notes/ imaging reports available for review    ASSESSMENT AND PLAN    ICD-10-CM ICD-9-CM    1. Facial numbness R20.0 782.0 MRI BRAIN W WO CONT       46 y.o. female with frequent/ daily bilateral facial numbness/ tingling and scalp paresthesias over the past 2-3 months. Agree with PCP checking TSH, B12, VitD. CMP was normal.  Normal facial sensation to LT, prick, temperature on both sides and neurologic exam was otherwise normal.  Asked pt whether any significant increase in stressors over past 3-5 months and she denies. Will check MRI Brain +/- contrast to evaluate for any CNS lesions that could cause her facial symptoms. F/u in 3-4 weeks to go over results. Thank you for allowing me to be a part of your patient's care. Please call me at [phone] if you have any questions.      Sincerely,  Fanny Manzano MD

## 2017-10-24 ENCOUNTER — HOSPITAL ENCOUNTER (OUTPATIENT)
Dept: MRI IMAGING | Age: 52
Discharge: HOME | End: 2017-10-24
Attending: PSYCHIATRY & NEUROLOGY
Payer: COMMERCIAL

## 2017-10-24 VITALS — WEIGHT: 230 LBS | BODY MASS INDEX: 37.12 KG/M2

## 2017-10-24 DIAGNOSIS — R20.0 FACIAL NUMBNESS: ICD-10-CM

## 2017-10-24 PROCEDURE — A9576 INJ PROHANCE MULTIPACK: HCPCS | Performed by: RADIOLOGY

## 2017-10-24 PROCEDURE — 74011250636 HC RX REV CODE- 250/636: Performed by: RADIOLOGY

## 2017-10-24 PROCEDURE — 70553 MRI BRAIN STEM W/O & W/DYE: CPT

## 2017-10-24 RX ADMIN — GADOTERIDOL 20 ML: 279.3 INJECTION, SOLUTION INTRAVENOUS at 08:17

## 2017-12-20 ENCOUNTER — OFFICE VISIT (OUTPATIENT)
Dept: FAMILY MEDICINE CLINIC | Age: 52
End: 2017-12-20

## 2017-12-20 VITALS
DIASTOLIC BLOOD PRESSURE: 70 MMHG | TEMPERATURE: 98.4 F | RESPIRATION RATE: 18 BRPM | WEIGHT: 238 LBS | HEART RATE: 71 BPM | SYSTOLIC BLOOD PRESSURE: 138 MMHG | BODY MASS INDEX: 38.25 KG/M2 | HEIGHT: 66 IN | OXYGEN SATURATION: 97 %

## 2017-12-20 DIAGNOSIS — E11.9 CONTROLLED TYPE 2 DIABETES MELLITUS WITHOUT COMPLICATION, WITHOUT LONG-TERM CURRENT USE OF INSULIN (HCC): ICD-10-CM

## 2017-12-20 DIAGNOSIS — R05.9 COUGH: Primary | ICD-10-CM

## 2017-12-20 DIAGNOSIS — J20.9 ACUTE BRONCHITIS, UNSPECIFIED ORGANISM: ICD-10-CM

## 2017-12-20 LAB
FLUAV+FLUBV AG NOSE QL IA.RAPID: NEGATIVE POS/NEG
FLUAV+FLUBV AG NOSE QL IA.RAPID: NEGATIVE POS/NEG
S PYO AG THROAT QL: NEGATIVE
VALID INTERNAL CONTROL?: YES
VALID INTERNAL CONTROL?: YES

## 2017-12-20 RX ORDER — ALBUTEROL SULFATE 90 UG/1
2 AEROSOL, METERED RESPIRATORY (INHALATION)
Qty: 1 INHALER | Refills: 1 | Status: SHIPPED | OUTPATIENT
Start: 2017-12-20 | End: 2019-08-12 | Stop reason: SDUPTHER

## 2017-12-20 RX ORDER — AZITHROMYCIN 250 MG/1
TABLET, FILM COATED ORAL
Qty: 6 TAB | Refills: 0 | Status: SHIPPED | OUTPATIENT
Start: 2017-12-20 | End: 2017-12-25

## 2017-12-20 RX ORDER — METFORMIN HYDROCHLORIDE 500 MG/1
TABLET ORAL
Qty: 180 TAB | Refills: 3 | Status: SHIPPED | OUTPATIENT
Start: 2017-12-20 | End: 2018-01-28

## 2017-12-20 NOTE — MR AVS SNAPSHOT
Visit Information Date & Time Provider Department Dept. Phone Encounter #  
 12/20/2017  2:00 PM Geovanni Centeno MD [facility] [phone] [id number] Upcoming Health Maintenance Date Due  
 FOOT EXAM Q1 12/26/1975 EYE EXAM RETINAL OR DILATED Q1 12/26/1975 FOBT Q 1 YEAR AGE 50-75 12/26/2015 Influenza Age 5 to Adult 8/1/2017 PAP AKA CERVICAL CYTOLOGY 8/12/2017 HEMOGLOBIN A1C Q6M 2/24/2018 MICROALBUMIN Q1 3/17/2018 LIPID PANEL Q1 8/24/2018 DTaP/Tdap/Td series (2 - Td) 3/17/2027 Allergies as of 12/20/2017  Review Complete On: 12/20/2017 By: Geovanni Centeno MD  
  
 Severity Noted Reaction Type Reactions Apple Medium 05/21/2015    Itching Current Immunizations  Reviewed on 12/2/2015 Name Date Influenza Vaccine (Quad) PF 12/2/2015 Pneumococcal Polysaccharide (PPSV-23) 3/17/2017 Tdap 3/17/2017 Not reviewed this visit You Were Diagnosed With   
  
 Codes Comments Cough    -  Primary ICD-10-CM: E95 ICD-9-CM: 786.2 Acute bronchitis, unspecified organism     ICD-10-CM: J20.9 ICD-9-CM: 466.0 Controlled type 2 diabetes mellitus without complication, without long-term current use of insulin ([facility]     ICD-10-CM: E11.9 ICD-9-CM: 250.00 Vitals BP Pulse Temp Resp Height(growth percentile) Weight(growth percentile) 138/70 71 98.4 °F (36.9 °C) (Oral) 18 5' 6\" (1.676 m) 238 lb (108 kg) SpO2 BMI OB Status Smoking Status 97% 38.41 kg/m2 Menopause Former Smoker Vitals History BMI and BSA Data Body Mass Index Body Surface Area  
 38.41 kg/m 2 2.24 m 2 Preferred Pharmacy Pharmacy Name Phone [id number] [facility] [phone] Your Updated Medication List  
  
   
This list is accurate as of: 12/20/17  2:38 PM.  Always use your most recent med list.  
  
  
  
  
 albuterol 90 mcg/actuation inhaler Commonly known as:  PROVENTIL HFA, VENTOLIN HFA, PROAIR HFA Take 2 Puffs by inhalation every four (4) hours as needed for Wheezing. azithromycin 250 mg tablet Commonly known as:  Lucinamary annarmond Harrison Take 2 tablets today, then take 1 tablet daily BD INSULIN PEN NEEDLE UF SHORT 31 gauge x 5/16\" Ndle Generic drug:  Insulin Needles (Disposable) USE WITH SAXENDA  
  
 clonazePAM 0.5 mg tablet Commonly known as:  KlonoPIN  
  
 diclofenac 1 % Gel Commonly known as:  VOLTAREN  
APPLY 4 GRAMS TO AFFECTED AREA 4 TIMES A DAY  
  
 erythromycin ophthalmic ointment Commonly known as:  ILOTYCIN Apply small amount to left eye four times a day for 7 days  Indications: BACTERIAL BLEPHARITIS Lancets Misc E11.9 Use to check blood glucose daily  
  
 metFORMIN 500 mg tablet Commonly known as:  GLUCOPHAGE  
TAKE 1 TAB BY MOUTH TWO (2) TIMES DAILY (WITH MEALS). naltrexone-buPROPion 8-90 mg Tber ER tablet Commonly known as:  Sharee Pock Week 1 1 tab PO QAM, Week 2 1QAM 1QHS, Week 3 2QAM 1 QHS, Week 4 & beyond 2QAM 2QHS  
  
 topiramate 25 mg tablet Commonly known as:  TOPAMAX Prescriptions Printed Refills  
 metFORMIN (GLUCOPHAGE) 500 mg tablet 3 Sig: TAKE 1 TAB BY MOUTH TWO (2) TIMES DAILY (WITH MEALS). Class: Print Prescriptions Sent to Pharmacy Refills  
 albuterol (PROVENTIL HFA, VENTOLIN HFA, PROAIR HFA) 90 mcg/actuation inhaler 1 Sig: Take 2 Puffs by inhalation every four (4) hours as needed for Wheezing. Class: Normal  
 Pharmacy: [address] [place] [address] Ph #: [phone] Route: Inhalation  
 azithromycin (ZITHROMAX) 250 mg tablet 0 Sig: Take 2 tablets today, then take 1 tablet daily Class: Normal  
 Pharmacy: [address] [place] [address] Ph #: [phone] We Performed the Following AMB POC RAPID STREP A [31492 CPT(R)] AMB POC LISSETH INFLUENZA A/B TEST [55535 CPT(R)] Introducing [facility] [place] [facility]! [facility] introduces [employer] patient portal. Now you can access parts of your medical record, email your doctor's office, and request medication refills online. 1. In your internet browser, go to [email or web address] 2. Click on the First Time User? Click Here link in the Sign In box. You will see the New Member Sign Up page. 3. Enter your [employer] Access Code exactly as it appears below. You will not need to use this code after youve completed the sign-up process. If you do not sign up before the expiration date, you must request a new code. · [employer] Access Code: [email or web address] Expires: 3/20/2018  2:38 PM 
 
4. Enter the last four digits of your Social Security Number (xxxx) and Date of Birth (mm/dd/yyyy) as indicated and click Submit. You will be taken to the next sign-up page. 5. Create a [employer] Connect ID. This will be your [employer] Connect login ID and cannot be changed, so think of one that is secure and easy to remember. 6. Create a [employer] password. You can change your password at any time. 7. Enter your Password Reset Question and Answer. This can be used at a later time if you forget your password. 8. Enter your e-mail address. You will receive e-mail notification when new information is available in [address] 9. Click Sign Up. You can now view and download portions of your medical record. 10. Click the Download Summary menu link to download a portable copy of your medical information. If you have questions, please visit the Frequently Asked Questions section of the [employer] website. Remember, [employer] is NOT to be used for urgent needs. For medical emergencies, dial 911. Now available from your iPhone and Android! Please provide this summary of care documentation to your next provider. Your primary care clinician is listed as Karen Rdz.  If you have any questions after today's visit, please call [phone].

## 2017-12-20 NOTE — PROGRESS NOTES
1. Have you been to the ER, urgent care clinic since your last visit? Hospitalized since your last visit? No    2. Have you seen or consulted any other health care providers outside of the [address] [place] [address] since your last visit? Include any pap smears or colon screening.  No     Chief Complaint   Patient presents with    Cough     started thursday    Chest Pain     Taking theraflu and mucinex

## 2017-12-20 NOTE — PROGRESS NOTES
Chief Complaint   Patient presents with    Cough     started thursday    Chest Pain     she is a 46y.o. year old female who presents for evalution. She developed a cough about a week ago, 3 days ago her chest started hurting. She is expressing a light yellow mucus  She does not think she has had a fever, does c/o back pain    Reviewed PmHx, RxHx, FmHx, SocHx, AllgHx and updated and dated in the chart. Aspirin yes ____   No____ N/A____    Patient Active Problem List    Diagnosis    Controlled type 2 diabetes mellitus without complication, without long-term current use of insulin ([address]    BREE (obstructive sleep apnea)    Diabetes mellitus type 2, controlled ([address]    Obesity (BMI 30-39. 9)    Prediabetes    New daily persistent headache    GERD (gastroesophageal reflux disease)    Chronic back pain    Overweight(278.02)    Fatigue       Nurse notes were reviewed and copied and are correct  Review of Systems - negative except as listed above in the HPI    Objective:     Vitals:    12/20/17 1408   BP: 138/70   Pulse: 71   Resp: 18   Temp: 98.4 °F (36.9 °C)   TempSrc: Oral   SpO2: 97%   Weight: 238 lb (108 kg)   Height: 5' 6\" (1.676 m)       Physical Examination: General appearance - alert, well appearing, and in no distress  Mental status - alert, oriented to person, place, and time  Ears - bilateral TM's and external ear canals normal  Mouth - mucous membranes moist, pharynx normal without lesions  Neck - supple, no significant adenopathy  Lymphatics - no palpable lymphadenopathy, no hepatosplenomegaly  Chest - clear to auscultation, no wheezes, rales or rhonchi, symmetric air entry  Heart - normal rate, regular rhythm, normal S1, S2, no murmurs, rubs, clicks or gallops      Assessment/ Plan:   Diagnoses and all orders for this visit:    1. Cough  -     AMB POC RAPID STREP A  -     AMB POC LISSETH INFLUENZA A/B TEST    2.  Acute bronchitis, unspecified organism  -     albuterol (PROVENTIL HFA, VENTOLIN HFA, PROAIR HFA) 90 mcg/actuation inhaler; Take 2 Puffs by inhalation every four (4) hours as needed for Wheezing.  -     azithromycin (ZITHROMAX) 250 mg tablet; Take 2 tablets today, then take 1 tablet daily    3. Controlled type 2 diabetes mellitus without complication, without long-term current use of insulin (HCC)  -     metFORMIN (GLUCOPHAGE) 500 mg tablet; TAKE 1 TAB BY MOUTH TWO (2) TIMES DAILY (WITH MEALS). Follow-up Disposition:  Return if symptoms worsen or fail to improve. ICD-10-CM ICD-9-CM    1. Cough R05 786.2 AMB POC RAPID STREP A      AMB POC LISSETH INFLUENZA A/B TEST   2. Acute bronchitis, unspecified organism J20.9 466.0 albuterol (PROVENTIL HFA, VENTOLIN HFA, PROAIR HFA) 90 mcg/actuation inhaler      azithromycin (ZITHROMAX) 250 mg tablet   3. Controlled type 2 diabetes mellitus without complication, without long-term current use of insulin (HCC) E11.9 250.00 metFORMIN (GLUCOPHAGE) 500 mg tablet       I have discussed the diagnosis with the patient and the intended plan as seen in the above orders. The patient has received an after-visit summary and questions were answered concerning future plans. Medication Side Effects and Warnings were discussed with patient: yes  Patient Labs were reviewed and or requested: yes  Patient Past Records were reviewed and or requested: yes        There are no Patient Instructions on file for this visit.     The patient verbalizes understanding and agrees with the plan of care        Patient has the advanced directives booklet to review

## 2017-12-28 DIAGNOSIS — R52 PAIN: ICD-10-CM

## 2017-12-28 RX ORDER — DICLOFENAC SODIUM 10 MG/G
2 GEL TOPICAL 4 TIMES DAILY
Qty: 100 G | Refills: 6 | Status: SHIPPED | OUTPATIENT
Start: 2017-12-28 | End: 2018-01-28

## 2018-01-05 DIAGNOSIS — E11.9 TYPE 2 DIABETES MELLITUS WITHOUT COMPLICATION, WITHOUT LONG-TERM CURRENT USE OF INSULIN (HCC): ICD-10-CM

## 2018-01-05 RX ORDER — LANCETS
EACH MISCELLANEOUS
Qty: 90 EACH | Refills: 3
Start: 2018-01-05 | End: 2018-01-28

## 2018-01-05 RX ORDER — INSULIN PUMP SYRINGE, 3 ML
EACH MISCELLANEOUS
Qty: 1 KIT | Refills: 0
Start: 2018-01-05 | End: 2018-01-28

## 2018-01-28 ENCOUNTER — APPOINTMENT (OUTPATIENT)
Dept: CT IMAGING | Age: 53
DRG: 419 | End: 2018-01-28
Attending: EMERGENCY MEDICINE
Payer: COMMERCIAL

## 2018-01-28 ENCOUNTER — HOSPITAL ENCOUNTER (INPATIENT)
Age: 53
LOS: 3 days | Discharge: HOME | DRG: 419 | End: 2018-01-31
Attending: EMERGENCY MEDICINE | Admitting: SURGERY
Payer: COMMERCIAL

## 2018-01-28 ENCOUNTER — APPOINTMENT (OUTPATIENT)
Dept: ULTRASOUND IMAGING | Age: 53
DRG: 419 | End: 2018-01-28
Attending: EMERGENCY MEDICINE
Payer: COMMERCIAL

## 2018-01-28 DIAGNOSIS — Z90.49 S/P LAPAROSCOPIC CHOLECYSTECTOMY: ICD-10-CM

## 2018-01-28 DIAGNOSIS — K80.50 BILIARY COLIC: Primary | ICD-10-CM

## 2018-01-28 PROBLEM — K80.71: Status: ACTIVE | Noted: 2018-01-28

## 2018-01-28 LAB
ALBUMIN SERPL-MCNC: 3.8 G/DL (ref 3.5–5)
ALBUMIN/GLOB SERPL: 0.9 {RATIO} (ref 1.1–2.2)
ALP SERPL-CCNC: 196 U/L (ref 45–117)
ALT SERPL-CCNC: 797 U/L (ref 12–78)
ANION GAP SERPL CALC-SCNC: 6 MMOL/L (ref 5–15)
APPEARANCE UR: CLEAR
AST SERPL-CCNC: 881 U/L (ref 15–37)
BACTERIA URNS QL MICRO: NEGATIVE /HPF
BASOPHILS # BLD: 0 K/UL (ref 0–0.1)
BASOPHILS NFR BLD: 0 % (ref 0–1)
BILIRUB SERPL-MCNC: 2.2 MG/DL (ref 0.2–1)
BILIRUB UR QL CFM: POSITIVE
BUN SERPL-MCNC: 9 MG/DL (ref 6–20)
BUN/CREAT SERPL: 11 (ref 12–20)
CALCIUM SERPL-MCNC: 9 MG/DL (ref 8.5–10.1)
CHLORIDE SERPL-SCNC: 107 MMOL/L (ref 97–108)
CO2 SERPL-SCNC: 27 MMOL/L (ref 21–32)
COLOR UR: NORMAL
CREAT SERPL-MCNC: 0.8 MG/DL (ref 0.55–1.02)
DIFFERENTIAL METHOD BLD: ABNORMAL
EOSINOPHIL # BLD: 0.1 K/UL (ref 0–0.4)
EOSINOPHIL NFR BLD: 1 % (ref 0–7)
EPITH CASTS URNS QL MICRO: NORMAL /LPF
ERYTHROCYTE [DISTWIDTH] IN BLOOD BY AUTOMATED COUNT: 12.7 % (ref 11.5–14.5)
GLOBULIN SER CALC-MCNC: 4.2 G/DL (ref 2–4)
GLUCOSE BLD STRIP.AUTO-MCNC: 109 MG/DL (ref 65–100)
GLUCOSE SERPL-MCNC: 146 MG/DL (ref 65–100)
GLUCOSE UR STRIP.AUTO-MCNC: NEGATIVE MG/DL
HCT VFR BLD AUTO: 40.9 % (ref 35–47)
HGB BLD-MCNC: 13.1 G/DL (ref 11.5–16)
HGB UR QL STRIP: NEGATIVE
IMM GRANULOCYTES # BLD: 0 K/UL (ref 0–0.04)
IMM GRANULOCYTES NFR BLD AUTO: 0 % (ref 0–0.5)
KETONES UR QL STRIP.AUTO: NEGATIVE MG/DL
LEUKOCYTE ESTERASE UR QL STRIP.AUTO: NEGATIVE
LIPASE SERPL-CCNC: 81 U/L (ref 73–393)
LYMPHOCYTES # BLD: 0.6 K/UL (ref 0.8–3.5)
LYMPHOCYTES NFR BLD: 7 % (ref 12–49)
MCH RBC QN AUTO: 29.3 PG (ref 26–34)
MCHC RBC AUTO-ENTMCNC: 32 G/DL (ref 30–36.5)
MCV RBC AUTO: 91.5 FL (ref 80–99)
MONOCYTES # BLD: 0.3 K/UL (ref 0–1)
MONOCYTES NFR BLD: 4 % (ref 5–13)
NEUTS SEG # BLD: 7.4 K/UL (ref 1.8–8)
NEUTS SEG NFR BLD: 88 % (ref 32–75)
NITRITE UR QL STRIP.AUTO: NEGATIVE
NRBC # BLD: 0 K/UL (ref 0–0.01)
NRBC BLD-RTO: 0 PER 100 WBC
PH UR STRIP: 5 [PH] (ref 5–8)
PLATELET # BLD AUTO: 295 K/UL (ref 150–400)
PMV BLD AUTO: 10.2 FL (ref 8.9–12.9)
POTASSIUM SERPL-SCNC: 4.2 MMOL/L (ref 3.5–5.1)
PROT SERPL-MCNC: 8 G/DL (ref 6.4–8.2)
PROT UR STRIP-MCNC: NEGATIVE MG/DL
RBC # BLD AUTO: 4.47 M/UL (ref 3.8–5.2)
RBC #/AREA URNS HPF: NORMAL /HPF (ref 0–5)
RBC MORPH BLD: ABNORMAL
SERVICE CMNT-IMP: ABNORMAL
SODIUM SERPL-SCNC: 140 MMOL/L (ref 136–145)
SP GR UR REFRACTOMETRY: 1.02 (ref 1–1.03)
TROPONIN I SERPL-MCNC: 0.06 NG/ML
UR CULT HOLD, URHOLD: NORMAL
UROBILINOGEN UR QL STRIP.AUTO: 0.2 EU/DL (ref 0.2–1)
WBC # BLD AUTO: 8.4 K/UL (ref 3.6–11)
WBC URNS QL MICRO: NORMAL /HPF (ref 0–4)

## 2018-01-28 PROCEDURE — 82962 GLUCOSE BLOOD TEST: CPT

## 2018-01-28 PROCEDURE — 96374 THER/PROPH/DIAG INJ IV PUSH: CPT

## 2018-01-28 PROCEDURE — 74011636320 HC RX REV CODE- 636/320: Performed by: EMERGENCY MEDICINE

## 2018-01-28 PROCEDURE — 76705 ECHO EXAM OF ABDOMEN: CPT

## 2018-01-28 PROCEDURE — 81001 URINALYSIS AUTO W/SCOPE: CPT | Performed by: EMERGENCY MEDICINE

## 2018-01-28 PROCEDURE — 77030032490 HC SLV COMPR SCD KNE COVD -B

## 2018-01-28 PROCEDURE — 74011250636 HC RX REV CODE- 250/636: Performed by: EMERGENCY MEDICINE

## 2018-01-28 PROCEDURE — 65270000029 HC RM PRIVATE

## 2018-01-28 PROCEDURE — 74011000258 HC RX REV CODE- 258: Performed by: EMERGENCY MEDICINE

## 2018-01-28 PROCEDURE — 96375 TX/PRO/DX INJ NEW DRUG ADDON: CPT

## 2018-01-28 PROCEDURE — 85025 COMPLETE CBC W/AUTO DIFF WBC: CPT | Performed by: EMERGENCY MEDICINE

## 2018-01-28 PROCEDURE — 80053 COMPREHEN METABOLIC PANEL: CPT | Performed by: EMERGENCY MEDICINE

## 2018-01-28 PROCEDURE — 84484 ASSAY OF TROPONIN QUANT: CPT | Performed by: EMERGENCY MEDICINE

## 2018-01-28 PROCEDURE — 36415 COLL VENOUS BLD VENIPUNCTURE: CPT | Performed by: EMERGENCY MEDICINE

## 2018-01-28 PROCEDURE — 74177 CT ABD & PELVIS W/CONTRAST: CPT

## 2018-01-28 PROCEDURE — 83690 ASSAY OF LIPASE: CPT | Performed by: EMERGENCY MEDICINE

## 2018-01-28 PROCEDURE — 99284 EMERGENCY DEPT VISIT MOD MDM: CPT

## 2018-01-28 RX ORDER — ONDANSETRON 2 MG/ML
4 INJECTION INTRAMUSCULAR; INTRAVENOUS
Status: COMPLETED | OUTPATIENT
Start: 2018-01-28 | End: 2018-01-28

## 2018-01-28 RX ORDER — SODIUM CHLORIDE 0.9 % (FLUSH) 0.9 %
10 SYRINGE (ML) INJECTION
Status: COMPLETED | OUTPATIENT
Start: 2018-01-28 | End: 2018-01-28

## 2018-01-28 RX ORDER — MORPHINE SULFATE 2 MG/ML
2 INJECTION, SOLUTION INTRAMUSCULAR; INTRAVENOUS
Status: COMPLETED | OUTPATIENT
Start: 2018-01-28 | End: 2018-01-28

## 2018-01-28 RX ORDER — ACETAMINOPHEN 325 MG/1
650 TABLET ORAL
Status: DISCONTINUED | OUTPATIENT
Start: 2018-01-28 | End: 2018-01-31 | Stop reason: HOSPADM

## 2018-01-28 RX ORDER — SODIUM CHLORIDE 0.9 % (FLUSH) 0.9 %
SYRINGE (ML) INJECTION
Status: COMPLETED
Start: 2018-01-28 | End: 2018-01-28

## 2018-01-28 RX ORDER — SODIUM CHLORIDE, SODIUM LACTATE, POTASSIUM CHLORIDE, CALCIUM CHLORIDE 600; 310; 30; 20 MG/100ML; MG/100ML; MG/100ML; MG/100ML
125 INJECTION, SOLUTION INTRAVENOUS CONTINUOUS
Status: DISCONTINUED | OUTPATIENT
Start: 2018-01-28 | End: 2018-01-30

## 2018-01-28 RX ORDER — ONDANSETRON 2 MG/ML
4 INJECTION INTRAMUSCULAR; INTRAVENOUS
Status: DISCONTINUED | OUTPATIENT
Start: 2018-01-28 | End: 2018-01-31 | Stop reason: HOSPADM

## 2018-01-28 RX ORDER — ALBUTEROL SULFATE 0.83 MG/ML
2.5 SOLUTION RESPIRATORY (INHALATION)
Status: DISCONTINUED | OUTPATIENT
Start: 2018-01-28 | End: 2018-01-31 | Stop reason: HOSPADM

## 2018-01-28 RX ORDER — HYDROMORPHONE HYDROCHLORIDE 1 MG/ML
1 INJECTION, SOLUTION INTRAMUSCULAR; INTRAVENOUS; SUBCUTANEOUS
Status: DISCONTINUED | OUTPATIENT
Start: 2018-01-28 | End: 2018-01-31 | Stop reason: HOSPADM

## 2018-01-28 RX ADMIN — Medication: at 19:00

## 2018-01-28 RX ADMIN — SODIUM CHLORIDE 100 ML: 900 INJECTION, SOLUTION INTRAVENOUS at 12:47

## 2018-01-28 RX ADMIN — Medication 10 ML: at 12:47

## 2018-01-28 RX ADMIN — MORPHINE SULFATE 2 MG: 2 INJECTION, SOLUTION INTRAMUSCULAR; INTRAVENOUS at 13:54

## 2018-01-28 RX ADMIN — ONDANSETRON 4 MG: 2 INJECTION INTRAMUSCULAR; INTRAVENOUS at 10:22

## 2018-01-28 RX ADMIN — IOPAMIDOL 100 ML: 755 INJECTION, SOLUTION INTRAVENOUS at 12:47

## 2018-01-28 RX ADMIN — MORPHINE SULFATE 2 MG: 2 INJECTION, SOLUTION INTRAMUSCULAR; INTRAVENOUS at 10:22

## 2018-01-28 RX ADMIN — SODIUM CHLORIDE 1000 ML: 900 INJECTION, SOLUTION INTRAVENOUS at 10:24

## 2018-01-28 NOTE — PROGRESS NOTES
Admission Medication Reconciliation:    Information obtained from: Patient    Significant PMH/Disease States:   Past Medical History:   Diagnosis Date    Diabetes [facility])        Chief Complaint for this Admission:  Abdominal pain    Allergies:  Apple    Prior to Admission Medications:   Prior to Admission Medications   Prescriptions Last Dose Informant Patient Reported? Taking? CALCIUM CARB/MAGNESIUM CARB (CALCIUM & MAGNESIUM CARBONATES PO)   Yes Yes   Sig: Take 1 Tab by mouth daily. albuterol (PROVENTIL HFA, VENTOLIN HFA, PROAIR HFA) 90 mcg/actuation inhaler   No Yes   Sig: Take 2 Puffs by inhalation every four (4) hours as needed for Wheezing. Facility-Administered Medications: None         Comments/Recommendations: added Calcium + Magnesium.

## 2018-01-28 NOTE — PROGRESS NOTES
TRANSFER - IN REPORT:    Verbal report received from ***(name) on Daniel Spillers  being received from ***(unit) for {TRANSFER CARE:46879}      Report consisted of patients Situation, Background, Assessment and   Recommendations(SBAR). Information from the following report(s) {SBAR REPORTS QSOU:11642} was reviewed with the receiving nurse. Opportunity for questions and clarification was provided. Assessment completed upon patients arrival to unit and care assumed.

## 2018-01-28 NOTE — ED TRIAGE NOTES
Pt reports onset of mid upper abd pain yesterday radiating into bilateral flanks, dry heaving this AM x 1. Reports normal BM today. Denies dysuria.

## 2018-01-28 NOTE — ED PROVIDER NOTES
HPI Comments: 46 y.o. female with past medical history significant for DM who presents from home via private vehicle with chief complaint of abdominal pain. Pt reports onset of abdominal cramping yesterday afternoon. Pt had a loose BM and the pain lasted about an hour before resolving. The pain returned at 1200 this morning and has been constant since. Associated sx include nausea and dry heaving. Pt reports having a normal BM this morning. Pt had a hernia repair 12 years ago but denies abdominal surgeries since. Denies hx of gallstones. There are no other acute medical concerns at this time. Social hx: former tobacco smoker, +social EtOH    PCP: Nely Doherty MD    Note written by Chandrika Hamilton, as dictated by Phuong Mancini MD 10:46 AM      The history is provided by the patient. No  was used. Past Medical History:   Diagnosis Date    Diabetes [facility])        Past Surgical History:   Procedure Laterality Date    HX  SECTION      x2    HX HERNIA REPAIR      HX TONSILLECTOMY           Family History:   Problem Relation Age of Onset    Elevated Lipids Mother     Diabetes Father     Hypertension Father    [facility] Elevated Lipids Sister     Thyroid Disease Sister     Hypertension Paternal Grandmother     Heart Disease Paternal Grandmother     Diabetes Paternal Grandmother     Hypertension Paternal Grandfather     Heart Disease Paternal Grandfather     Diabetes Paternal Grandfather     Cancer Maternal Grandmother      colon       Social History     Social History    Marital status:      Spouse name: N/A    Number of children: N/A    Years of education: N/A     Occupational History    Not on file.      Social History Main Topics    Smoking status: Former Smoker     Packs/day: 0.10    Smokeless tobacco: Never Used    Alcohol use Yes      Comment: socially    Drug use: No    Sexual activity: Not on file     Other Topics Concern    Not on file Social History Narrative         ALLERGIES: Apple    Review of Systems   Constitutional: Negative for activity change, chills and fever. HENT: Negative for nosebleeds, sore throat, trouble swallowing and voice change. Eyes: Negative for visual disturbance. Respiratory: Negative for shortness of breath. Cardiovascular: Negative for chest pain and palpitations. Gastrointestinal: Positive for abdominal pain, diarrhea (resolved) and nausea. Negative for constipation. Genitourinary: Negative for difficulty urinating, dysuria, hematuria and urgency. Musculoskeletal: Negative for back pain, neck pain and neck stiffness. Skin: Negative for color change. Allergic/Immunologic: Negative for immunocompromised state. Neurological: Negative for dizziness, seizures, syncope, weakness, light-headedness, numbness and headaches. Psychiatric/Behavioral: Negative for behavioral problems, confusion, hallucinations, self-injury and suicidal ideas. Vitals:    01/28/18 0957 01/28/18 1000 01/28/18 1002   BP: 133/73 133/73    Pulse: 68     Resp: 16     Temp: 98.3 °F (36.8 °C)     SpO2: 99%  98%   Weight: 104.8 kg (231 lb)     Height: 5' 6\" (1.676 m)              Physical Exam   Constitutional: She is oriented to person, place, and time. She appears well-developed. No distress. Obese. HENT:   Head: Normocephalic and atraumatic. Eyes: Pupils are equal, round, and reactive to light. Neck: Normal range of motion. Neck supple. Cardiovascular: Normal rate, regular rhythm and normal heart sounds. Exam reveals no gallop and no friction rub. No murmur heard. Pulmonary/Chest: Effort normal and breath sounds normal. No respiratory distress. She has no wheezes. Abdominal: Soft. Bowel sounds are normal. She exhibits no distension. There is no rebound and no guarding.   +Sullivan's sign. Epigastric tenderness to palpation. Musculoskeletal: Normal range of motion.    Neurological: She is alert and oriented to person, place, and time. Skin: Skin is warm. No rash noted. She is not diaphoretic. Psychiatric: She has a normal mood and affect. Her behavior is normal. Judgment and thought content normal.   Nursing note and vitals reviewed. Note written by Chandrika Medina, as dictated by Nelda Petersen MD 10:51 AM        [facility]  ED Course     This is a 70-year-old female with past medical history, review of systems, physical exam as above, presenting with complaints of abdominal pain, nausea, without vomiting, and normal bowel movements since yesterday evening. Patient states discomfort, began after a late meal, endorsed ongoing discomfort yesterday evening, preventing her from sleeping overnight. Patient denies previous history of same, endorses surgical history consists of hernia repair, states 2 normal bowel movements since, physical exam remarkable for right upper quadrant tenderness to palpation, consistent with Reche Sosa sign, generalized abdominal tenderness otherwise, also in the epigastrium. Suspect biliary colic, differential includes pancreatitis, gastritis, UTI. Plan to provide pain control, obtain CMP, CBC, provide antiemetics, obtain UA, right upper quadrant ultrasound. The setting of negative studies, will obtain CT scan, and make a disposition based on the patient's diagnostics and response to therapy. Procedures    CONSULT NOTE:  1:35 PM Nelda Petersen MD spoke with Dr. Ghazal Murphy for General Surgery. Discussed available diagnostic tests and clinical findings. Dr. Mike Montiel will admit the pt.

## 2018-01-28 NOTE — PROGRESS NOTES
Problem: Diabetes Maintenance:Admission  Goal: Activity/Safety  Outcome: Progressing Towards Goal  Up ambulating to BR  Goal: Nutrition  Outcome: Progressing Towards Goal  Full liquid for dinner, ice chips only after midnight    Problem: Pain - Acute  Goal: *Control of acute pain  Outcome: Progressing Towards Goal  Pain controlled at this time

## 2018-01-28 NOTE — IP AVS SNAPSHOT
[address] [address] 
[phone] Patient: Duane Hutch MRN: [id number] :1965 You are allergic to the following Allergen Reactions Apple Itching Immunizations Administered for This Admission Name Date Influenza Vaccine (Quad) PF 2018 Recent Documentation Height Weight BMI OB Status Smoking Status 1.676 m 104.8 kg 37.28 kg/m2 Menopause Former Smoker Emergency Contacts  (Rel.) Home Phone Work Phone Mobile Phone Rene Paez [birth year] -- [phone] About your hospitalization You were admitted on:  2018 You last received care in the:  [facility] You were discharged on:  2018 Why you were hospitalized Your primary diagnosis was:  Not on File Your diagnoses also included:  Biliary Calculus With Obstruction Without Cholecystitis Providers Seen During Your Hospitalization Provider Specialty Primary office phone Violeta Pulido MD Emergency Medicine [phone] Nadir Gupta MD General Surgery [phone] Your Primary Care Physician (PCP) Primary Care Physician Office Phone Office Fax Stacy Smith [phone] Follow-up Information Follow up With Details Comments Contact Info Jermain Bowden MD   [address] [place] [address] [address] [address] 
[phone] My Medications TAKE these medications as instructed Instructions Each Dose to Equal  
 Morning Noon Evening Bedtime  
 albuterol 90 mcg/actuation inhaler Commonly known as:  PROVENTIL HFA, VENTOLIN HFA, PROAIR HFA Your last dose was: Your next dose is: Take 2 Puffs by inhalation every four (4) hours as needed for Wheezing. 2 Puff  CALCIUM & MAGNESIUM CARBONATES PO  
   
 Your last dose was: Your next dose is: Take 1 Tab by mouth daily. 1 Tab HYDROcodone-acetaminophen 5-325 mg per tablet Commonly known as:  Clemetine Garrick Your last dose was: Your next dose is: Take 1 Tab by mouth every four (4) hours as needed. Max Daily Amount: 6 Tabs. 1 Tab Where to Get Your Medications Information on where to get these meds will be given to you by the nurse or doctor. ! Ask your nurse or doctor about these medications HYDROcodone-acetaminophen 5-325 mg per tablet Discharge Instructions Call [phone] to schedule Surgery follow-up appointment for 2 weeks after procedure. Cholecystectomy: What to Expect at [facility] Your Recovery After your surgery, it is normal to feel weak and tired for several days after you return home. Your belly may be swollen. If you had laparoscopic surgery, you may also have pain in your shoulder for about 24 hours. You may have gas or need to burp a lot at first, and a few people get diarrhea. The diarrhea usually goes away in 2 to 4 weeks, but it may last longer. How quickly you recover depends on whether you had a laparoscopic or open surgery. · For a laparoscopic surgery, most people can go back to work or their normal routine in 1 to 2 weeks, but it may take longer, depending on the type of work you do. · For an open surgery, it will probably take 4 to 6 weeks before you get back to your normal routine. This care sheet gives you a general idea about how long it will take for you to recover. However, each person recovers at a different pace. Follow the steps below to get better as quickly as possible. How can you care for yourself at home? Activity ? · Rest when you feel tired. Getting enough sleep will help you recover. ? · Try to walk each day.  Start out by walking a little more than you did the day before. Gradually increase the amount you walk. Walking boosts blood flow and helps prevent pneumonia and constipation. ? · For about 2 to 4 weeks, avoid lifting anything that would make you strain. This may include a child, heavy grocery bags and milk containers, a heavy briefcase or backpack, cat litter or dog food bags, or a vacuum . ? · Avoid strenuous activities, such as biking, jogging, weightlifting, and aerobic exercise, until your doctor says it is okay. ? · You may shower 24 hours after surgery. Pat the cuts (incisions) dry. Do not take a bath for the first 2 weeks, or until your doctor tells you it is okay. ? · You may drive when you are no longer taking pain medicine and can quickly move your foot from the gas pedal to the brake. You must also be able to sit comfortably for a long period of time, even if you do not plan to go far. You might get caught in traffic. ? · For a laparoscopic surgery, most people can go back to work or their normal routine in 1 to 2 weeks, but it may take longer. For an open surgery, it will probably take 4 to 6 weeks before you get back to your normal routine. ? · Your doctor will tell you when you can have sex again. ? Diet ? · Eat smaller meals more often instead of fewer larger meals. You can eat a normal diet, but avoid eating fatty foods for about 1 month. Fatty foods include hamburger, whole milk, cheese, and many snack foods. If your stomach is upset, try bland, low-fat foods like plain rice, broiled chicken, toast, and yogurt. ? · Drink plenty of fluids (unless your doctor tells you not to). ? · If you have diarrhea, try avoiding spicy foods, dairy products, fatty foods, and alcohol. You can also watch to see if specific foods cause it, and stop eating them. If the diarrhea continues for more than 2 weeks, talk to your doctor.   
? · You may notice that your bowel movements are not regular right after your surgery. This is common. Try to avoid constipation and straining with bowel movements. You may want to take a fiber supplement every day. If you have not had a bowel movement after a couple of days, ask your doctor about taking a mild laxative. Medicines ? · Your doctor will tell you if and when you can restart your medicines. He or she will also give you instructions about taking any new medicines. ? · If you take blood thinners, such as warfarin (Coumadin), clopidogrel (Plavix), or aspirin, be sure to talk to your doctor. He or she will tell you if and when to start taking those medicines again. Make sure that you understand exactly what your doctor wants you to do. ? · Take pain medicines exactly as directed. ¨ If the doctor gave you a prescription medicine for pain, take it as prescribed. ¨ If you are not taking a prescription pain medicine, take an over-the-counter medicine such as acetaminophen (Tylenol), ibuprofen (Advil, Motrin), or naproxen (Aleve). Read and follow all instructions on the label. ¨ Do not take two or more pain medicines at the same time unless the doctor told you to. Many pain medicines contain acetaminophen, which is Tylenol. Too much Tylenol can be harmful. ? · If you think your pain medicine is making you sick to your stomach: 
¨ Take your medicine after meals (unless your doctor tells you not to). ¨ Ask your doctor for a different pain medicine. ? · If your doctor prescribed antibiotics, take them as directed. Do not stop taking them just because you feel better. You need to take the full course of antibiotics. Incision care ? · If you have strips of tape on the incision, or cut, leave the tape on for a week or until it falls off.  
? · After 24 to 48 hours, wash the area daily with warm, soapy water, and pat it dry. ? · You may have staples to hold the cut together. Keep them dry until your doctor takes them out. This is usually in 7 to 10 days. ? · Keep the area clean and dry. You may cover it with a gauze bandage if it weeps or rubs against clothing. Change the bandage every day. ?Ice ? · To reduce swelling and pain, put ice or a cold pack on your belly for 10 to 20 minutes at a time. Do this every 1 to 2 hours. Put a thin cloth between the ice and your skin. Follow-up care is a key part of your treatment and safety. Be sure to make and go to all appointments, and call your doctor if you are having problems. It's also a good idea to know your test results and keep a list of the medicines you take. When should you call for help? Call 911 anytime you think you may need emergency care. For example, call if: 
? · You passed out (lost consciousness). ? · You are short of breath. Bree Maldonado ? Call your doctor now or seek immediate medical care if: 
? · You are sick to your stomach and cannot drink fluids. ? · You have pain that does not get better when you take your pain medicine. ? · You cannot pass stools or gas. ? · You have signs of infection, such as: 
¨ Increased pain, swelling, warmth, or redness. ¨ Red streaks leading from the incision. ¨ Pus draining from the incision. ¨ A fever. ? · Bright red blood has soaked through the bandage over your incision. ? · You have loose stitches, or your incision comes open. ? · You have signs of a blood clot in your leg (called a deep vein thrombosis), such as: 
¨ Pain in your calf, back of knee, thigh, or groin. ¨ Redness and swelling in your leg or groin. ? Watch closely for any changes in your health, and be sure to contact your doctor if you have any problems. Where can you learn more? Go to http://cassandra-ary.info/. Enter [phone] [id number] [phone] in the search box to learn more about \"Cholecystectomy: What to Expect at Home. \" Current as of: May 12, 2017 Content Version: 11.4 © 4385-3430 Healthwise, Incorporated.  Care instructions adapted under license by [address] (which disclaims liability or warranty for this information). If you have questions about a medical condition or this instruction, always ask your healthcare professional. Diana Banuelos any warranty or liability for your use of this information. Discharge Orders None Introducing [facility]! [address] introduces [employer] patient portal. Now you can access parts of your medical record, email your doctor's office, and request medication refills online. 1. In your internet browser, go to [email or web address] 2. Click on the First Time User? Click Here link in the Sign In box. You will see the New Member Sign Up page. 3. Enter your [employer] Access Code exactly as it appears below. You will not need to use this code after youve completed the sign-up process. If you do not sign up before the expiration date, you must request a new code. · [employer] Access Code: [email or web address] Expires: 3/20/2018  2:38 PM 
 
4. Enter the last four digits of your Social Security Number (xxxx) and Date of Birth (mm/dd/yyyy) as indicated and click Submit. You will be taken to the next sign-up page. 5. Create a [employer] ID. This will be your [employer] login ID and cannot be changed, so think of one that is secure and easy to remember. 6. Create a [employer] password. You can change your password at any time. 7. Enter your Password Reset Question and Answer. This can be used at a later time if you forget your password. 8. Enter your e-mail address. You will receive e-mail notification when new information is available in [address] 9. Click Sign Up. You can now view and download portions of your medical record. 10. Click the Download Summary menu link to download a portable copy of your medical information.  
 
If you have questions, please visit the Frequently Asked Questions section of the [employer]. Remember, MyChart is NOT to be used for urgent needs. For medical emergencies, dial 911. Now available from your iPhone and Android! General Information Please provide this summary of care documentation to your next provider. Patient Signature:  ____________________________________________________________ Date:  ____________________________________________________________  
  
Petrona Medico Provider Signature:  ____________________________________________________________ Date:  ____________________________________________________________

## 2018-01-28 NOTE — CONSULTS
Chief Complaint:  Acute cholecystitis    HPI:  45 yo woman with hx diet controlled DM, laparoscopic umbilical hernia repair presented to ED with abdominal pain an nausea/vomiting. Pt reported symptoms developed yesterday. Pt has been having nausea/vomiting and dry heaves. Pain has been in RUQ and epigastric are. Pt denied any fever or chills. Pt was noted to have  CT scan showed pericholecystic in RUQ and right US showed cholelithiasis. Past Medical History:   Diagnosis Date    Diabetes [facility])      Past Surgical History:   Procedure Laterality Date    HX  SECTION      x2    HX HERNIA REPAIR      HX TONSILLECTOMY         No current facility-administered medications on file prior to encounter. Current Outpatient Prescriptions on File Prior to Encounter   Medication Sig Dispense Refill    albuterol (PROVENTIL HFA, VENTOLIN HFA, PROAIR HFA) 90 mcg/actuation inhaler Take 2 Puffs by inhalation every four (4) hours as needed for Wheezing.  1 Inhaler 1       Allergies   Allergen Reactions    Apple Itching       Review of Systems - General ROS: negative  Psychological ROS: negative  Respiratory ROS: negative  Cardiovascular ROS: negative  Gastrointestinal ROS: positive for - abdominal pain and nausea/vomiting    Visit Vitals    /63 (BP 1 Location: Right arm, BP Patient Position: At rest)    Pulse (!) 54    Temp 98.3 °F (36.8 °C)    Resp 18    Ht 5' 6\" (1.676 m)    Wt 231 lb (104.8 kg)    SpO2 99%    BMI 37.28 kg/m2         Physical Exam:    Gen:  NAD  Pulm:  wUnlabored  Abd:  S/mildly distended/moderate TTP    Recent Results (from the past 24 hour(s))   CBC WITH AUTOMATED DIFF    Collection Time: 18 10:04 AM   Result Value Ref Range    WBC 8.4 3.6 - 11.0 K/uL    RBC 4.47 3.80 - 5.20 M/uL    HGB 13.1 11.5 - 16.0 g/dL    HCT 40.9 35.0 - 47.0 %    MCV 91.5 80.0 - 99.0 FL    MCH 29.3 26.0 - 34.0 PG    MCHC 32.0 30.0 - 36.5 g/dL    RDW 12.7 11.5 - 14.5 %    PLATELET 685 506 - 790 K/uL MPV 10.2 8.9 - 12.9 FL    NRBC 0.0 0  WBC    ABSOLUTE NRBC 0.00 0.00 - 0.01 K/uL    NEUTROPHILS 88 (H) 32 - 75 %    LYMPHOCYTES 7 (L) 12 - 49 %    MONOCYTES 4 (L) 5 - 13 %    EOSINOPHILS 1 0 - 7 %    BASOPHILS 0 0 - 1 %    IMMATURE GRANULOCYTES 0 0.0 - 0.5 %    ABS. NEUTROPHILS 7.4 1.8 - 8.0 K/UL    ABS. LYMPHOCYTES 0.6 (L) 0.8 - 3.5 K/UL    ABS. MONOCYTES 0.3 0.0 - 1.0 K/UL    ABS. EOSINOPHILS 0.1 0.0 - 0.4 K/UL    ABS. BASOPHILS 0.0 0.0 - 0.1 K/UL    ABS. IMM. GRANS. 0.0 0.00 - 0.04 K/UL    DF SMEAR SCANNED      RBC COMMENTS NORMOCYTIC, NORMOCHROMIC     METABOLIC PANEL, COMPREHENSIVE    Collection Time: 01/28/18 10:04 AM   Result Value Ref Range    Sodium 140 136 - 145 mmol/L    Potassium 4.2 3.5 - 5.1 mmol/L    Chloride 107 97 - 108 mmol/L    CO2 27 21 - 32 mmol/L    Anion gap 6 5 - 15 mmol/L    Glucose 146 (H) 65 - 100 mg/dL    BUN 9 6 - 20 MG/DL    Creatinine 0.80 0.55 - 1.02 MG/DL    BUN/Creatinine ratio 11 (L) 12 - 20      GFR est AA >60 >60 ml/min/1.73m2    GFR est non-AA >60 >60 ml/min/1.73m2    Calcium 9.0 8.5 - 10.1 MG/DL    Bilirubin, total 2.2 (H) 0.2 - 1.0 MG/DL    ALT (SGPT) 797 (H) 12 - 78 U/L    AST (SGOT) 881 (H) 15 - 37 U/L    Alk.  phosphatase 196 (H) 45 - 117 U/L    Protein, total 8.0 6.4 - 8.2 g/dL    Albumin 3.8 3.5 - 5.0 g/dL    Globulin 4.2 (H) 2.0 - 4.0 g/dL    A-G Ratio 0.9 (L) 1.1 - 2.2     LIPASE    Collection Time: 01/28/18 10:04 AM   Result Value Ref Range    Lipase 81 73 - 393 U/L   TROPONIN I    Collection Time: 01/28/18 10:07 AM   Result Value Ref Range    Troponin-I, Qt. 0.06 (H) <0.05 ng/mL   URINALYSIS W/MICROSCOPIC    Collection Time: 01/28/18 11:23 AM   Result Value Ref Range    Color DARK YELLOW      Appearance CLEAR CLEAR      Specific gravity 1.018 1.003 - 1.030      pH (UA) 5.0 5.0 - 8.0      Protein NEGATIVE  NEG mg/dL    Glucose NEGATIVE  NEG mg/dL    Ketone NEGATIVE  NEG mg/dL    Blood NEGATIVE  NEG      Urobilinogen 0.2 0.2 - 1.0 EU/dL    Nitrites NEGATIVE  NEG Leukocyte Esterase NEGATIVE  NEG      WBC 0-4 0 - 4 /hpf    RBC 0-5 0 - 5 /hpf    Epithelial cells FEW FEW /lpf    Bacteria NEGATIVE  NEG /hpf   URINE CULTURE HOLD SAMPLE    Collection Time: 01/28/18 11:23 AM   Result Value Ref Range    Urine culture hold URINE ON HOLD IN MICROBIOLOGY DEPT FOR 3 DAYS     BILIRUBIN, CONFIRM    Collection Time: 01/28/18 11:23 AM   Result Value Ref Range    Bilirubin UA, confirm POSITIVE (A) NEG       AP: 45 yo woman with cholecystitis and elevated liver enzymes    - Cholecystitis:  Will obtain MRCP to evaluate for elevated liver enzymes for biliary obstruction  - full liquids and NPO after midnight  - Zosyn antibiotic  - possible OR tomorrow pending MRCP results

## 2018-01-29 ENCOUNTER — APPOINTMENT (OUTPATIENT)
Dept: MRI IMAGING | Age: 53
DRG: 419 | End: 2018-01-29
Attending: SURGERY
Payer: COMMERCIAL

## 2018-01-29 LAB
ALBUMIN SERPL-MCNC: 2.8 G/DL (ref 3.5–5)
ALBUMIN/GLOB SERPL: 0.8 {RATIO} (ref 1.1–2.2)
ALP SERPL-CCNC: 221 U/L (ref 45–117)
ALT SERPL-CCNC: 565 U/L (ref 12–78)
ANION GAP SERPL CALC-SCNC: 7 MMOL/L (ref 5–15)
AST SERPL-CCNC: 379 U/L (ref 15–37)
BILIRUB SERPL-MCNC: 3.9 MG/DL (ref 0.2–1)
BUN SERPL-MCNC: 5 MG/DL (ref 6–20)
BUN/CREAT SERPL: 7 (ref 12–20)
CALCIUM SERPL-MCNC: 8.5 MG/DL (ref 8.5–10.1)
CHLORIDE SERPL-SCNC: 108 MMOL/L (ref 97–108)
CO2 SERPL-SCNC: 26 MMOL/L (ref 21–32)
CREAT SERPL-MCNC: 0.69 MG/DL (ref 0.55–1.02)
ERYTHROCYTE [DISTWIDTH] IN BLOOD BY AUTOMATED COUNT: 12.6 % (ref 11.5–14.5)
GLOBULIN SER CALC-MCNC: 3.6 G/DL (ref 2–4)
GLUCOSE SERPL-MCNC: 116 MG/DL (ref 65–100)
HCT VFR BLD AUTO: 36.4 % (ref 35–47)
HGB BLD-MCNC: 11.5 G/DL (ref 11.5–16)
MCH RBC QN AUTO: 28.9 PG (ref 26–34)
MCHC RBC AUTO-ENTMCNC: 31.6 G/DL (ref 30–36.5)
MCV RBC AUTO: 91.5 FL (ref 80–99)
NRBC # BLD: 0 K/UL (ref 0–0.01)
NRBC BLD-RTO: 0 PER 100 WBC
PLATELET # BLD AUTO: 229 K/UL (ref 150–400)
PMV BLD AUTO: 10.4 FL (ref 8.9–12.9)
POTASSIUM SERPL-SCNC: 3.7 MMOL/L (ref 3.5–5.1)
PROT SERPL-MCNC: 6.4 G/DL (ref 6.4–8.2)
RBC # BLD AUTO: 3.98 M/UL (ref 3.8–5.2)
SODIUM SERPL-SCNC: 141 MMOL/L (ref 136–145)
WBC # BLD AUTO: 6.1 K/UL (ref 3.6–11)

## 2018-01-29 PROCEDURE — 74011250636 HC RX REV CODE- 250/636: Performed by: SURGERY

## 2018-01-29 PROCEDURE — 65270000029 HC RM PRIVATE

## 2018-01-29 PROCEDURE — 85027 COMPLETE CBC AUTOMATED: CPT | Performed by: SURGERY

## 2018-01-29 PROCEDURE — 74011000258 HC RX REV CODE- 258: Performed by: SURGERY

## 2018-01-29 PROCEDURE — A9577 INJ MULTIHANCE: HCPCS | Performed by: SURGERY

## 2018-01-29 PROCEDURE — 80053 COMPREHEN METABOLIC PANEL: CPT | Performed by: SURGERY

## 2018-01-29 PROCEDURE — 77030021566 MRI ABD W MRCP W WO CONT

## 2018-01-29 PROCEDURE — 36415 COLL VENOUS BLD VENIPUNCTURE: CPT | Performed by: SURGERY

## 2018-01-29 PROCEDURE — 74011250637 HC RX REV CODE- 250/637: Performed by: SURGERY

## 2018-01-29 RX ADMIN — ACETAMINOPHEN 650 MG: 325 TABLET, FILM COATED ORAL at 08:36

## 2018-01-29 RX ADMIN — SODIUM CHLORIDE, SODIUM LACTATE, POTASSIUM CHLORIDE, AND CALCIUM CHLORIDE 125 ML/HR: 600; 310; 30; 20 INJECTION, SOLUTION INTRAVENOUS at 13:43

## 2018-01-29 RX ADMIN — HYDROMORPHONE HYDROCHLORIDE 1 MG: 1 INJECTION, SOLUTION INTRAMUSCULAR; INTRAVENOUS; SUBCUTANEOUS at 23:32

## 2018-01-29 RX ADMIN — ACETAMINOPHEN 650 MG: 325 TABLET, FILM COATED ORAL at 18:00

## 2018-01-29 RX ADMIN — SODIUM CHLORIDE 100 ML: 900 INJECTION, SOLUTION INTRAVENOUS at 09:00

## 2018-01-29 RX ADMIN — GADOBENATE DIMEGLUMINE 20 ML: 529 INJECTION, SOLUTION INTRAVENOUS at 09:00

## 2018-01-29 RX ADMIN — SODIUM CHLORIDE, SODIUM LACTATE, POTASSIUM CHLORIDE, AND CALCIUM CHLORIDE 125 ML/HR: 600; 310; 30; 20 INJECTION, SOLUTION INTRAVENOUS at 04:10

## 2018-01-29 NOTE — PROGRESS NOTES
Problem: Discharge Planning  Goal: *Discharge to safe environment  Outcome: Progressing Towards Goal  Discharge planning as needed.

## 2018-01-29 NOTE — LETTER
NOTIFICATION RETURN TO EXERCISE 
 
1/29/2018 4:22 PM 
 
Ms. Lysle Carrel [address] [facility] [address] [place] [id number] To Whom It May Concern: 
 
Lysle Carrel is currently under the care of [facility]. She is very seriously ill and will not be able to use the personal training in the near future. I cannot give a resumption date at this time. I appreciate anything you can do to avoid her being billed for the personal training until she is able to resume the service. If there are questions or concerns please have the patient contact our office.  
 
 
 
Sincerely, 
 
 
Dennie Rigg, MD

## 2018-01-29 NOTE — PROGRESS NOTES
· Surgery Progress Note    1/29/2018    Admit Date: 1/28/2018    Subjective:       Pt has no acute c/o, no abdominal pain since yesterday, has not required any narcotics  Pts pain has resolved . No SOB. No CP. Pt is ambulating. Patient 's current diet is NPO, with some ice chips. . Pt reports  no nausea and no vomiting. Pt reports no fever or chills    Bowel Movements: she reports pale \"fluffy\" bowel movement PTA         Objective:     Blood pressure 130/78, pulse (!) 57, temperature 98 °F (36.7 °C), resp. rate 16, height 5' 6\" (1.676 m), weight 231 lb (104.8 kg), SpO2 99 %.               General appearance: alert, cooperative, no distress, appears stated age  Lungs: clear to auscultation bilaterally  Heart: regular rate and rhythm  Abd:soft, nondistended, normal bowel sounds, nontender, without guarding, without rebound  Extremities: no edema  Neurologic: Grossly normal    Data Review    Recent Results (from the past 12 hour(s))   CBC W/O DIFF    Collection Time: 01/29/18  4:18 AM   Result Value Ref Range    WBC 6.1 3.6 - 11.0 K/uL    RBC 3.98 3.80 - 5.20 M/uL    HGB 11.5 11.5 - 16.0 g/dL    HCT 36.4 35.0 - 47.0 %    MCV 91.5 80.0 - 99.0 FL    MCH 28.9 26.0 - 34.0 PG    MCHC 31.6 30.0 - 36.5 g/dL    RDW 12.6 11.5 - 14.5 %    PLATELET 126 379 - 922 K/uL    MPV 10.4 8.9 - 12.9 FL    NRBC 0.0 0  WBC    ABSOLUTE NRBC 0.00 0.00 - 0.38 K/uL   METABOLIC PANEL, COMPREHENSIVE    Collection Time: 01/29/18  4:18 AM   Result Value Ref Range    Sodium 141 136 - 145 mmol/L    Potassium 3.7 3.5 - 5.1 mmol/L    Chloride 108 97 - 108 mmol/L    CO2 26 21 - 32 mmol/L    Anion gap 7 5 - 15 mmol/L    Glucose 116 (H) 65 - 100 mg/dL    BUN 5 (L) 6 - 20 MG/DL    Creatinine 0.69 0.55 - 1.02 MG/DL    BUN/Creatinine ratio 7 (L) 12 - 20      GFR est AA >60 >60 ml/min/1.73m2    GFR est non-AA >60 >60 ml/min/1.73m2    Calcium 8.5 8.5 - 10.1 MG/DL    Bilirubin, total 3.9 (H) 0.2 - 1.0 MG/DL    ALT (SGPT) 565 (H) 12 - 78 U/L    AST (SGOT) 379 (H) 15 - 37 U/L    Alk.  phosphatase 221 (H) 45 - 117 U/L    Protein, total 6.4 6.4 - 8.2 g/dL    Albumin 2.8 (L) 3.5 - 5.0 g/dL    Globulin 3.6 2.0 - 4.0 g/dL    A-G Ratio 0.8 (L) 1.1 - 2.2         Assessment:     Active Problems:    Biliary calculus with obstruction without cholecystitis (1/28/2018)        Plan:   [address] with ice chips   GI Prophylaxis  OOB  Labs reviewed, Tbili up to 3.9, up from yesterday, MRCP completed and final read is pending, if MRCP shows a stone will ask Dr. Aide Castillo to see for ERCP, plan for lap estefania with IOC on this admission -- timing depending on GI involvement  Labs in AM  Pain management prn   Further plan per Dr. Kyara Quintana, Dr. Rae Pickett PA-C    Addendum  MRCP shows small stones, no obstruction, no CBD stones, some pericholecystic fluid, possible cholecystitis  Repeat labs in AM   DW Dr. Kyara Quintana, ok for clears tonight, NPO p MN for lap estefania with IOC tomorrow  OR notified  Obtain consent  ANNETTE Nowak

## 2018-01-30 ENCOUNTER — ANESTHESIA (OUTPATIENT)
Dept: SURGERY | Age: 53
DRG: 419 | End: 2018-01-30
Payer: COMMERCIAL

## 2018-01-30 ENCOUNTER — ANESTHESIA EVENT (OUTPATIENT)
Dept: SURGERY | Age: 53
DRG: 419 | End: 2018-01-30
Payer: COMMERCIAL

## 2018-01-30 LAB
ALBUMIN SERPL-MCNC: 2.7 G/DL (ref 3.5–5)
ALBUMIN/GLOB SERPL: 0.8 {RATIO} (ref 1.1–2.2)
ALP SERPL-CCNC: 211 U/L (ref 45–117)
ALT SERPL-CCNC: 370 U/L (ref 12–78)
ANION GAP SERPL CALC-SCNC: 6 MMOL/L (ref 5–15)
AST SERPL-CCNC: 145 U/L (ref 15–37)
ATRIAL RATE: 51 BPM
BILIRUB SERPL-MCNC: 1.2 MG/DL (ref 0.2–1)
BUN SERPL-MCNC: 4 MG/DL (ref 6–20)
BUN/CREAT SERPL: 6 (ref 12–20)
CALCIUM SERPL-MCNC: 8.6 MG/DL (ref 8.5–10.1)
CALCULATED P AXIS, ECG09: 30 DEGREES
CALCULATED R AXIS, ECG10: 43 DEGREES
CALCULATED T AXIS, ECG11: 27 DEGREES
CHLORIDE SERPL-SCNC: 107 MMOL/L (ref 97–108)
CO2 SERPL-SCNC: 29 MMOL/L (ref 21–32)
CREAT SERPL-MCNC: 0.65 MG/DL (ref 0.55–1.02)
DIAGNOSIS, 93000: NORMAL
GLOBULIN SER CALC-MCNC: 3.3 G/DL (ref 2–4)
GLUCOSE BLD STRIP.AUTO-MCNC: 170 MG/DL (ref 65–100)
GLUCOSE SERPL-MCNC: 95 MG/DL (ref 65–100)
P-R INTERVAL, ECG05: 156 MS
POTASSIUM SERPL-SCNC: 3.7 MMOL/L (ref 3.5–5.1)
PROT SERPL-MCNC: 6 G/DL (ref 6.4–8.2)
Q-T INTERVAL, ECG07: 454 MS
QRS DURATION, ECG06: 106 MS
QTC CALCULATION (BEZET), ECG08: 418 MS
SERVICE CMNT-IMP: ABNORMAL
SODIUM SERPL-SCNC: 142 MMOL/L (ref 136–145)
VENTRICULAR RATE, ECG03: 51 BPM

## 2018-01-30 PROCEDURE — 74011250636 HC RX REV CODE- 250/636

## 2018-01-30 PROCEDURE — 77030027138 HC INCENT SPIROMETER -A

## 2018-01-30 PROCEDURE — 77030026438 HC STYL ET INTUB CARD -A: Performed by: ANESTHESIOLOGY

## 2018-01-30 PROCEDURE — 77030008684 HC TU ET CUF COVD -B: Performed by: ANESTHESIOLOGY

## 2018-01-30 PROCEDURE — 74011000250 HC RX REV CODE- 250

## 2018-01-30 PROCEDURE — 36415 COLL VENOUS BLD VENIPUNCTURE: CPT | Performed by: PHYSICIAN ASSISTANT

## 2018-01-30 PROCEDURE — 74011250636 HC RX REV CODE- 250/636: Performed by: SURGERY

## 2018-01-30 PROCEDURE — 77030011640 HC PAD GRND REM COVD -A: Performed by: SURGERY

## 2018-01-30 PROCEDURE — 77030020263 HC SOL INJ SOD CL0.9% LFCR 1000ML: Performed by: SURGERY

## 2018-01-30 PROCEDURE — 77030010507 HC ADH SKN DERMBND J&J -B: Performed by: SURGERY

## 2018-01-30 PROCEDURE — 77030002933 HC SUT MCRYL J&J -A: Performed by: SURGERY

## 2018-01-30 PROCEDURE — 76010000149 HC OR TIME 1 TO 1.5 HR: Performed by: SURGERY

## 2018-01-30 PROCEDURE — 76210000017 HC OR PH I REC 1.5 TO 2 HR: Performed by: SURGERY

## 2018-01-30 PROCEDURE — 77030009403 HC ELECTRD ENDO MEGA -B: Performed by: SURGERY

## 2018-01-30 PROCEDURE — 77030008756 HC TU IRR SUC STRY -B: Performed by: SURGERY

## 2018-01-30 PROCEDURE — 74011000258 HC RX REV CODE- 258

## 2018-01-30 PROCEDURE — 77030035051: Performed by: SURGERY

## 2018-01-30 PROCEDURE — 77030037032 HC INSRT SCIS CLICKLLINE DISP STOR -B: Performed by: SURGERY

## 2018-01-30 PROCEDURE — 88304 TISSUE EXAM BY PATHOLOGIST: CPT | Performed by: SURGERY

## 2018-01-30 PROCEDURE — 80053 COMPREHEN METABOLIC PANEL: CPT | Performed by: PHYSICIAN ASSISTANT

## 2018-01-30 PROCEDURE — 77030020747 HC TU INSUF ENDOSC TELE -A: Performed by: SURGERY

## 2018-01-30 PROCEDURE — 77030002895 HC DEV VASC CLOSR COVD -B: Performed by: SURGERY

## 2018-01-30 PROCEDURE — 77030012029 HC APPL CLP LIG COVD -C: Performed by: SURGERY

## 2018-01-30 PROCEDURE — 77030035048 HC TRCR ENDOSC OPTCL COVD -B: Performed by: SURGERY

## 2018-01-30 PROCEDURE — 77030009852 HC PCH RTVR ENDOSC COVD -B: Performed by: SURGERY

## 2018-01-30 PROCEDURE — 77030031139 HC SUT VCRL2 J&J -A: Performed by: SURGERY

## 2018-01-30 PROCEDURE — 77030032490 HC SLV COMPR SCD KNE COVD -B: Performed by: SURGERY

## 2018-01-30 PROCEDURE — 76060000033 HC ANESTHESIA 1 TO 1.5 HR: Performed by: SURGERY

## 2018-01-30 PROCEDURE — 82962 GLUCOSE BLOOD TEST: CPT

## 2018-01-30 PROCEDURE — 74011250637 HC RX REV CODE- 250/637: Performed by: SURGERY

## 2018-01-30 PROCEDURE — 0FT44ZZ RESECTION OF GALLBLADDER, PERCUTANEOUS ENDOSCOPIC APPROACH: ICD-10-PCS | Performed by: SURGERY

## 2018-01-30 PROCEDURE — 77030008771 HC TU NG SALEM SUMP -A: Performed by: ANESTHESIOLOGY

## 2018-01-30 PROCEDURE — 74011000250 HC RX REV CODE- 250: Performed by: SURGERY

## 2018-01-30 PROCEDURE — 93005 ELECTROCARDIOGRAM TRACING: CPT

## 2018-01-30 PROCEDURE — 65270000032 HC RM SEMIPRIVATE

## 2018-01-30 PROCEDURE — 77030035045 HC TRCR ENDOSC VRSPRT BLDLSS COVD -B: Performed by: SURGERY

## 2018-01-30 PROCEDURE — 77030018876 HC APPL CLP LIG4 COVD -B: Performed by: SURGERY

## 2018-01-30 PROCEDURE — 77030018836 HC SOL IRR NACL ICUM -A: Performed by: SURGERY

## 2018-01-30 RX ORDER — MIDAZOLAM HYDROCHLORIDE 1 MG/ML
1 INJECTION, SOLUTION INTRAMUSCULAR; INTRAVENOUS AS NEEDED
Status: CANCELLED | OUTPATIENT
Start: 2018-01-30

## 2018-01-30 RX ORDER — ENOXAPARIN SODIUM 100 MG/ML
40 INJECTION SUBCUTANEOUS EVERY 24 HOURS
Status: DISCONTINUED | OUTPATIENT
Start: 2018-01-31 | End: 2018-01-31 | Stop reason: HOSPADM

## 2018-01-30 RX ORDER — FENTANYL CITRATE 50 UG/ML
25 INJECTION, SOLUTION INTRAMUSCULAR; INTRAVENOUS
Status: DISCONTINUED | OUTPATIENT
Start: 2018-01-30 | End: 2018-01-30 | Stop reason: HOSPADM

## 2018-01-30 RX ORDER — KETOROLAC TROMETHAMINE 30 MG/ML
INJECTION, SOLUTION INTRAMUSCULAR; INTRAVENOUS AS NEEDED
Status: DISCONTINUED | OUTPATIENT
Start: 2018-01-30 | End: 2018-01-30 | Stop reason: HOSPADM

## 2018-01-30 RX ORDER — SODIUM CHLORIDE 0.9 % (FLUSH) 0.9 %
5-10 SYRINGE (ML) INJECTION EVERY 8 HOURS
Status: CANCELLED | OUTPATIENT
Start: 2018-01-30

## 2018-01-30 RX ORDER — GLYCOPYRROLATE 0.2 MG/ML
INJECTION INTRAMUSCULAR; INTRAVENOUS AS NEEDED
Status: DISCONTINUED | OUTPATIENT
Start: 2018-01-30 | End: 2018-01-30 | Stop reason: HOSPADM

## 2018-01-30 RX ORDER — PROPOFOL 10 MG/ML
INJECTION, EMULSION INTRAVENOUS AS NEEDED
Status: DISCONTINUED | OUTPATIENT
Start: 2018-01-30 | End: 2018-01-30 | Stop reason: HOSPADM

## 2018-01-30 RX ORDER — SODIUM CHLORIDE 0.9 % (FLUSH) 0.9 %
5-10 SYRINGE (ML) INJECTION EVERY 8 HOURS
Status: DISCONTINUED | OUTPATIENT
Start: 2018-01-30 | End: 2018-01-31 | Stop reason: HOSPADM

## 2018-01-30 RX ORDER — MORPHINE SULFATE 10 MG/ML
2 INJECTION, SOLUTION INTRAMUSCULAR; INTRAVENOUS
Status: DISCONTINUED | OUTPATIENT
Start: 2018-01-30 | End: 2018-01-30 | Stop reason: HOSPADM

## 2018-01-30 RX ORDER — ONDANSETRON 2 MG/ML
INJECTION INTRAMUSCULAR; INTRAVENOUS AS NEEDED
Status: DISCONTINUED | OUTPATIENT
Start: 2018-01-30 | End: 2018-01-30 | Stop reason: HOSPADM

## 2018-01-30 RX ORDER — ONDANSETRON 2 MG/ML
4 INJECTION INTRAMUSCULAR; INTRAVENOUS AS NEEDED
Status: DISCONTINUED | OUTPATIENT
Start: 2018-01-30 | End: 2018-01-30 | Stop reason: HOSPADM

## 2018-01-30 RX ORDER — SUCCINYLCHOLINE CHLORIDE 20 MG/ML
INJECTION INTRAMUSCULAR; INTRAVENOUS AS NEEDED
Status: DISCONTINUED | OUTPATIENT
Start: 2018-01-30 | End: 2018-01-30 | Stop reason: HOSPADM

## 2018-01-30 RX ORDER — ROCURONIUM BROMIDE 10 MG/ML
INJECTION, SOLUTION INTRAVENOUS AS NEEDED
Status: DISCONTINUED | OUTPATIENT
Start: 2018-01-30 | End: 2018-01-30 | Stop reason: HOSPADM

## 2018-01-30 RX ORDER — ROPIVACAINE HYDROCHLORIDE 5 MG/ML
30 INJECTION, SOLUTION EPIDURAL; INFILTRATION; PERINEURAL AS NEEDED
Status: CANCELLED | OUTPATIENT
Start: 2018-01-30

## 2018-01-30 RX ORDER — GLYCOPYRROLATE 0.2 MG/ML
0.2 INJECTION INTRAMUSCULAR; INTRAVENOUS ONCE
Status: COMPLETED | OUTPATIENT
Start: 2018-01-30 | End: 2018-01-30

## 2018-01-30 RX ORDER — SODIUM CHLORIDE 9 MG/ML
25 INJECTION, SOLUTION INTRAVENOUS CONTINUOUS
Status: CANCELLED | OUTPATIENT
Start: 2018-01-30 | End: 2018-01-31

## 2018-01-30 RX ORDER — SODIUM CHLORIDE, SODIUM LACTATE, POTASSIUM CHLORIDE, CALCIUM CHLORIDE 600; 310; 30; 20 MG/100ML; MG/100ML; MG/100ML; MG/100ML
100 INJECTION, SOLUTION INTRAVENOUS CONTINUOUS
Status: DISCONTINUED | OUTPATIENT
Start: 2018-01-30 | End: 2018-01-30 | Stop reason: HOSPADM

## 2018-01-30 RX ORDER — FENTANYL CITRATE 50 UG/ML
50 INJECTION, SOLUTION INTRAMUSCULAR; INTRAVENOUS AS NEEDED
Status: CANCELLED | OUTPATIENT
Start: 2018-01-30

## 2018-01-30 RX ORDER — HYDROMORPHONE HYDROCHLORIDE 2 MG/ML
INJECTION, SOLUTION INTRAMUSCULAR; INTRAVENOUS; SUBCUTANEOUS AS NEEDED
Status: DISCONTINUED | OUTPATIENT
Start: 2018-01-30 | End: 2018-01-30 | Stop reason: HOSPADM

## 2018-01-30 RX ORDER — SODIUM CHLORIDE 0.9 % (FLUSH) 0.9 %
5-10 SYRINGE (ML) INJECTION AS NEEDED
Status: DISCONTINUED | OUTPATIENT
Start: 2018-01-30 | End: 2018-01-30 | Stop reason: HOSPADM

## 2018-01-30 RX ORDER — LIDOCAINE HYDROCHLORIDE 20 MG/ML
INJECTION, SOLUTION EPIDURAL; INFILTRATION; INTRACAUDAL; PERINEURAL AS NEEDED
Status: DISCONTINUED | OUTPATIENT
Start: 2018-01-30 | End: 2018-01-30 | Stop reason: HOSPADM

## 2018-01-30 RX ORDER — MIDAZOLAM HYDROCHLORIDE 1 MG/ML
INJECTION, SOLUTION INTRAMUSCULAR; INTRAVENOUS AS NEEDED
Status: DISCONTINUED | OUTPATIENT
Start: 2018-01-30 | End: 2018-01-30 | Stop reason: HOSPADM

## 2018-01-30 RX ORDER — DIPHENHYDRAMINE HYDROCHLORIDE 50 MG/ML
12.5 INJECTION, SOLUTION INTRAMUSCULAR; INTRAVENOUS AS NEEDED
Status: DISCONTINUED | OUTPATIENT
Start: 2018-01-30 | End: 2018-01-30 | Stop reason: HOSPADM

## 2018-01-30 RX ORDER — SODIUM CHLORIDE 0.9 % (FLUSH) 0.9 %
5-10 SYRINGE (ML) INJECTION AS NEEDED
Status: DISCONTINUED | OUTPATIENT
Start: 2018-01-30 | End: 2018-01-31 | Stop reason: HOSPADM

## 2018-01-30 RX ORDER — SODIUM CHLORIDE, SODIUM LACTATE, POTASSIUM CHLORIDE, CALCIUM CHLORIDE 600; 310; 30; 20 MG/100ML; MG/100ML; MG/100ML; MG/100ML
75 INJECTION, SOLUTION INTRAVENOUS CONTINUOUS
Status: DISCONTINUED | OUTPATIENT
Start: 2018-01-30 | End: 2018-01-31 | Stop reason: HOSPADM

## 2018-01-30 RX ORDER — FENTANYL CITRATE 50 UG/ML
INJECTION, SOLUTION INTRAMUSCULAR; INTRAVENOUS AS NEEDED
Status: DISCONTINUED | OUTPATIENT
Start: 2018-01-30 | End: 2018-01-30 | Stop reason: HOSPADM

## 2018-01-30 RX ORDER — DEXAMETHASONE SODIUM PHOSPHATE 4 MG/ML
INJECTION, SOLUTION INTRA-ARTICULAR; INTRALESIONAL; INTRAMUSCULAR; INTRAVENOUS; SOFT TISSUE AS NEEDED
Status: DISCONTINUED | OUTPATIENT
Start: 2018-01-30 | End: 2018-01-30 | Stop reason: HOSPADM

## 2018-01-30 RX ORDER — HYDROMORPHONE HYDROCHLORIDE 1 MG/ML
0.5 INJECTION, SOLUTION INTRAMUSCULAR; INTRAVENOUS; SUBCUTANEOUS
Status: DISCONTINUED | OUTPATIENT
Start: 2018-01-30 | End: 2018-01-30 | Stop reason: HOSPADM

## 2018-01-30 RX ORDER — NEOSTIGMINE METHYLSULFATE 1 MG/ML
INJECTION INTRAVENOUS AS NEEDED
Status: DISCONTINUED | OUTPATIENT
Start: 2018-01-30 | End: 2018-01-30 | Stop reason: HOSPADM

## 2018-01-30 RX ORDER — SODIUM CHLORIDE 0.9 % (FLUSH) 0.9 %
5-10 SYRINGE (ML) INJECTION AS NEEDED
Status: CANCELLED | OUTPATIENT
Start: 2018-01-30

## 2018-01-30 RX ORDER — LIDOCAINE HYDROCHLORIDE 10 MG/ML
0.1 INJECTION, SOLUTION EPIDURAL; INFILTRATION; INTRACAUDAL; PERINEURAL AS NEEDED
Status: CANCELLED | OUTPATIENT
Start: 2018-01-30

## 2018-01-30 RX ORDER — HYDROCODONE BITARTRATE AND ACETAMINOPHEN 5; 325 MG/1; MG/1
1 TABLET ORAL
Status: DISCONTINUED | OUTPATIENT
Start: 2018-01-30 | End: 2018-01-31 | Stop reason: HOSPADM

## 2018-01-30 RX ORDER — BUPIVACAINE HYDROCHLORIDE 5 MG/ML
INJECTION, SOLUTION EPIDURAL; INTRACAUDAL AS NEEDED
Status: DISCONTINUED | OUTPATIENT
Start: 2018-01-30 | End: 2018-01-30 | Stop reason: HOSPADM

## 2018-01-30 RX ORDER — GLYCOPYRROLATE 0.2 MG/ML
INJECTION INTRAMUSCULAR; INTRAVENOUS
Status: COMPLETED
Start: 2018-01-30 | End: 2018-01-30

## 2018-01-30 RX ORDER — MIDAZOLAM HYDROCHLORIDE 1 MG/ML
0.5 INJECTION, SOLUTION INTRAMUSCULAR; INTRAVENOUS
Status: DISCONTINUED | OUTPATIENT
Start: 2018-01-30 | End: 2018-01-30 | Stop reason: HOSPADM

## 2018-01-30 RX ORDER — SODIUM CHLORIDE, SODIUM LACTATE, POTASSIUM CHLORIDE, CALCIUM CHLORIDE 600; 310; 30; 20 MG/100ML; MG/100ML; MG/100ML; MG/100ML
100 INJECTION, SOLUTION INTRAVENOUS CONTINUOUS
Status: CANCELLED | OUTPATIENT
Start: 2018-01-30 | End: 2018-01-31

## 2018-01-30 RX ADMIN — SUCCINYLCHOLINE CHLORIDE 160 MG: 20 INJECTION INTRAMUSCULAR; INTRAVENOUS at 11:45

## 2018-01-30 RX ADMIN — GLYCOPYRROLATE 0.2 MG: 0.2 INJECTION, SOLUTION INTRAMUSCULAR; INTRAVENOUS at 13:54

## 2018-01-30 RX ADMIN — KETOROLAC TROMETHAMINE 30 MG: 30 INJECTION, SOLUTION INTRAMUSCULAR; INTRAVENOUS at 12:25

## 2018-01-30 RX ADMIN — GLYCOPYRROLATE 0.5 MG: 0.2 INJECTION INTRAMUSCULAR; INTRAVENOUS at 12:29

## 2018-01-30 RX ADMIN — BENZOCAINE AND MENTHOL 1 LOZENGE: 15; 3.6 LOZENGE ORAL at 22:43

## 2018-01-30 RX ADMIN — HYDROMORPHONE HYDROCHLORIDE 0.2 MG: 2 INJECTION, SOLUTION INTRAMUSCULAR; INTRAVENOUS; SUBCUTANEOUS at 12:11

## 2018-01-30 RX ADMIN — HYDROMORPHONE HYDROCHLORIDE 1 MG: 1 INJECTION, SOLUTION INTRAMUSCULAR; INTRAVENOUS; SUBCUTANEOUS at 22:43

## 2018-01-30 RX ADMIN — FENTANYL CITRATE 100 MCG: 50 INJECTION, SOLUTION INTRAMUSCULAR; INTRAVENOUS at 11:45

## 2018-01-30 RX ADMIN — NEOSTIGMINE METHYLSULFATE 3 MG: 1 INJECTION INTRAVENOUS at 12:29

## 2018-01-30 RX ADMIN — HYDROMORPHONE HYDROCHLORIDE 1 MG: 1 INJECTION, SOLUTION INTRAMUSCULAR; INTRAVENOUS; SUBCUTANEOUS at 14:00

## 2018-01-30 RX ADMIN — SODIUM CHLORIDE, SODIUM LACTATE, POTASSIUM CHLORIDE, AND CALCIUM CHLORIDE 75 ML/HR: 600; 310; 30; 20 INJECTION, SOLUTION INTRAVENOUS at 21:23

## 2018-01-30 RX ADMIN — ROCURONIUM BROMIDE 10 MG: 10 INJECTION, SOLUTION INTRAVENOUS at 11:45

## 2018-01-30 RX ADMIN — HYDROMORPHONE HYDROCHLORIDE 0.2 MG: 2 INJECTION, SOLUTION INTRAMUSCULAR; INTRAVENOUS; SUBCUTANEOUS at 11:58

## 2018-01-30 RX ADMIN — ONDANSETRON 4 MG: 2 INJECTION INTRAMUSCULAR; INTRAVENOUS at 11:59

## 2018-01-30 RX ADMIN — GLYCOPYRROLATE 0.2 MG: 0.2 INJECTION INTRAMUSCULAR; INTRAVENOUS at 13:54

## 2018-01-30 RX ADMIN — HYDROMORPHONE HYDROCHLORIDE 0.2 MG: 2 INJECTION, SOLUTION INTRAMUSCULAR; INTRAVENOUS; SUBCUTANEOUS at 12:01

## 2018-01-30 RX ADMIN — SODIUM CHLORIDE, SODIUM LACTATE, POTASSIUM CHLORIDE, AND CALCIUM CHLORIDE 125 ML/HR: 600; 310; 30; 20 INJECTION, SOLUTION INTRAVENOUS at 06:30

## 2018-01-30 RX ADMIN — PROPOFOL 200 MG: 10 INJECTION, EMULSION INTRAVENOUS at 11:45

## 2018-01-30 RX ADMIN — MIDAZOLAM HYDROCHLORIDE 2 MG: 1 INJECTION, SOLUTION INTRAMUSCULAR; INTRAVENOUS at 11:36

## 2018-01-30 RX ADMIN — ROCURONIUM BROMIDE 20 MG: 10 INJECTION, SOLUTION INTRAVENOUS at 11:58

## 2018-01-30 RX ADMIN — DEXAMETHASONE SODIUM PHOSPHATE 8 MG: 4 INJECTION, SOLUTION INTRA-ARTICULAR; INTRALESIONAL; INTRAMUSCULAR; INTRAVENOUS; SOFT TISSUE at 11:59

## 2018-01-30 RX ADMIN — ACETAMINOPHEN 650 MG: 325 TABLET, FILM COATED ORAL at 07:14

## 2018-01-30 RX ADMIN — LIDOCAINE HYDROCHLORIDE 80 MG: 20 INJECTION, SOLUTION EPIDURAL; INFILTRATION; INTRACAUDAL; PERINEURAL at 11:45

## 2018-01-30 RX ADMIN — Medication 10 ML: at 18:04

## 2018-01-30 NOTE — PERIOP NOTES
TRANSFER - IN REPORT:    Verbal report received from Leslie(name) on Henna Renae  being received from (unit) for ordered procedure      Report consisted of patients Situation, Background, Assessment and   Recommendations(SBAR). Information from the following report(s) SBAR, Kardex, Intake/Output, MAR and Recent Results was reviewed with the receiving nurse. Opportunity for questions and clarification was provided. Assessment completed upon patients arrival to unit and care assumed.

## 2018-01-30 NOTE — PROGRESS NOTES
Bedside verbal shift change report given to oncoming nurse Octavia Mcmahan R.N. Opportunity for questions and clarifications provided.

## 2018-01-30 NOTE — OP NOTES
[facility] OP NOTE    Chase Marshall.  MR#: [id number]  : 1965  ACCOUNT #: [de-identified]   DATE OF SERVICE: 2018    PREOPERATIVE DIAGNOSES  1. Chronic cholecystitis. 2.  History of choledocholithiasis. POSTOPERATIVE DIAGNOSES  1. Chronic cholecystitis. 2.  History of choledocholithiasis. PROCEDURES PERFORMED:  Laparoscopic cholecystectomy. ATTENDING SURGEON:  Prudence Maloney MD.    ANESTHESIA:  General endotracheal.    DRAIN:  None. SPONGE COUNT:  Correct. NEEDLE COUNT:  Correct. SPECIMEN REMOVED:  Gallbladder with contents. IMPLANTS:  None. ESTIMATED BLOOD LOSS:  30 mL. COMPLICATIONS:  None. INDICATIONS:  The patient is a 59-year-old diabetic -American female, recently admitted with severe abdominal pain. Imaging revealed gallstones with some pericholecystic fluid. Liver function tests were elevated, and she was admitted for serial labs. Bilirubin continued to increase, but MRCP performed on 2018 was negative for choledocholithiasis. Her labs have begun to normalize, and she is taken to the operating room today for laparoscopic cholecystectomy. FINDINGS:  Chronic cholecystitis with cholelithiasis. PROCEDURE:  The patient was identified as the correct patient in the preoperative holding area and informed consent was confirmed. After answering the patient's many questions, she was taken to the operating room and placed on the operating room table in the supine position. Sequential compression devices were placed on both lower extremities. Following the uneventful initiation of general anesthesia, she was carefully secured to the operating room table with a footboard and safety strap in place. All potential pressure points were padded with egg crate. Abdomen was prepped and draped in the usual sterile fashion.   Final timeout was performed, and it was confirmed she had received intravenous antibiotics. A 5 mm trocar was inserted through a small right-sided skin incision using an Optiview technique. After confirming intraperitoneal location of the trocar tip, insufflation with carbon dioxide gas was initiated. Once adequate working space was then developed, a 5 mm, 30 degree laparoscope was inserted. No signs of trocar entry were present. The liver was noted to be enlarged. A 12 mm trocar was inserted through a small epigastric midline incision using visual guidance with the laparoscope. The patient was placed in the steep reverse Trendelenburg position. Two additional trocars were inserted through small upper abdominal incisions using identical technique. The gallbladder was grasped at the fundus with retraction oriented cephalad. Adhesions between the duodenum and pericolic fat to the infundibulum were taken down using blunt dissection and electrocautery. Once freed, the infundibulum was grasped and retracted laterally, thus opening Calot's triangle. Peritoneal tissue was stripped away from the infundibulum in the direction of the portal structures. This allowed identification of the cystic artery and cystic duct. Each structure was circumferentially dissected free from surrounding tissue. Once a critical view of safety had been achieved, the cystic artery was doubly ligated between titanium Hemoclips and then divided. The cystic duct was triply ligated between titanium Hemoclips and then divided. The gallbladder was dissected free from the gallbladder fossa of the liver using electrocautery and blunt dissection. Once freed from the liver, the gallbladder was placed within a retrieval bag and removed from the patient's body. Once pneumoperitoneum had been reestablished, the right upper quadrant was irrigated with sterile saline. After confirming adequate hemostasis, the epigastric 12 mm fascial defect was closed with 0 Vicryl suture using a laparoscopic suture passer. Pneumoperitoneum was released and all trocars were removed. All wounds were infiltrated with 0.5% Marcaine without epinephrine. All skin edges were reapproximated with a combination of subcuticular 4-0 Monocryl suture and Dermabond. The patient tolerated the procedure well. She was extubated in the operating room and transported to the recovery area in stable condition. The attending surgeon, Dr. Megan Rust, was scrubbed and present for the entire procedure.       Jarret Beavers MD       [facility]  D: 01/30/2018 13:04     T: 01/30/2018 13:25  JOB #: [id number]

## 2018-01-30 NOTE — PROGRESS NOTES
Problem: Falls - Risk of  Goal: *Absence of Falls  Document Robert Fall Risk and appropriate interventions in the flowsheet.    Outcome: Progressing Towards Goal  Fall Risk Interventions:            Medication Interventions: Patient to call before getting OOB

## 2018-01-30 NOTE — ANESTHESIA PREPROCEDURE EVALUATION
Anesthetic History   No history of anesthetic complications            Review of Systems / Medical History  Patient summary reviewed, nursing notes reviewed and pertinent labs reviewed    Pulmonary  Within defined limits      Sleep apnea           Neuro/Psych   Within defined limits           Cardiovascular  Within defined limits                     GI/Hepatic/Renal  Within defined limits   GERD           Endo/Other  Within defined limits  Diabetes    Morbid obesity     Other Findings              Physical Exam    Airway  Mallampati: II  TM Distance: > 6 cm  Neck ROM: normal range of motion   Mouth opening: Normal     Cardiovascular  Regular rate and rhythm,  S1 and S2 normal,  no murmur, click, rub, or gallop             Dental  No notable dental hx       Pulmonary  Breath sounds clear to auscultation               Abdominal  GI exam deferred       Other Findings            Anesthetic Plan    ASA: 2  Anesthesia type: general          Induction: Intravenous  Anesthetic plan and risks discussed with: Patient

## 2018-01-30 NOTE — ANESTHESIA POSTPROCEDURE EVALUATION
Post-Anesthesia Evaluation and Assessment    Patient: Jagjit Huddleston MRN: [id number]  SSN: [id number]    YOB: 1965  Age: 46 y.o. Sex: female       Cardiovascular Function/Vital Signs  Visit Vitals    /62    Pulse (!) 43    Temp 36.8 °C (98.3 °F)    Resp 13    Ht 5' 6\" (1.676 m)    Wt 104.8 kg (231 lb)    SpO2 100%    BMI 37.28 kg/m2       Patient is status post general anesthesia for Procedure(s):  CHOLECYSTECTOMY LAPAROSCOPIC. Nausea/Vomiting: None    Postoperative hydration reviewed and adequate. Pain:  Pain Scale 1: Numeric (0 - 10) (01/30/18 1255)  Pain Intensity 1: 0 (01/30/18 1255)   Managed    Neurological Status:   Neuro (WDL): Within Defined Limits (01/30/18 1255)  Neuro  Neurologic State: Drowsy (01/30/18 1255)  Orientation Level: Appropriate for age;Oriented X4 (01/30/18 0750)  Cognition: Appropriate decision making; Appropriate for age attention/concentration; Appropriate safety awareness; Follows commands (01/30/18 0750)  Speech: Appropriate for age;Clear (01/30/18 0750)  LUE Motor Response: Purposeful (01/30/18 1255)  LLE Motor Response: Purposeful (01/30/18 1255)  RUE Motor Response: Purposeful (01/30/18 1255)  RLE Motor Response: Purposeful (01/30/18 1255)   At baseline    Mental Status and Level of Consciousness: Arousable    Pulmonary Status:   O2 Device: Nasal cannula (01/30/18 1255)   Adequate oxygenation and airway patent    Complications related to anesthesia: None    Post-anesthesia assessment completed.  No concerns    Signed By: Krystian Pineda MD     January 30, 2018

## 2018-01-30 NOTE — BRIEF OP NOTE
BRIEF OPERATIVE NOTE    Date of Procedure: 1/30/2018   Preoperative Diagnosis: Chronic Cholecystitis  Postoperative Diagnosis: Chronic Cholecystitis    Procedure(s):  CHOLECYSTECTOMY LAPAROSCOPIC  Surgeon(s) and Role:     * Irene Browning MD - Primary         Assistant Staff: None      Surgical Staff:  Circ-1: Lauryn Roberts RN  Circ-Relief: Favian Knutson  Scrub RN-Relief: Danielito Davison RN  Surg Asst-1: Dwight Garcia  Event Time In   Incision Start 1157   Incision Close      Anesthesia: General   Estimated Blood Loss: 30 cc  Specimens:   ID Type Source Tests Collected by Time Destination   1 : Gallbladder  Fresh Gallbladder  Irene Browning MD 1/30/2018 1222 Pathology      Findings: chronic cholecystitis   Complications: none  Implants: * No implants in log *

## 2018-01-30 NOTE — ROUTINE PROCESS
PACU Handoff:    Patient: Radha Turpin MRN: [id number]  SSN: [id number]   YOB: 1965  Age: 46 y.o. Sex: female     Patient is status post Procedure(s):  CHOLECYSTECTOMY LAPAROSCOPIC.     Surgeon(s) and Role:     * Tenzin Ba MD - Primary    Local/Dose/Irrigation: 8 ml 0.5% Bupivacaine plain for local                Peripheral IV 01/28/18 Left Antecubital (Active)   Site Assessment Clean, dry, & intact 1/30/2018  8:54 AM   Phlebitis Assessment 0 1/30/2018  8:54 AM   Infiltration Assessment 0 1/30/2018  8:54 AM   Dressing Status Clean, dry, & intact 1/30/2018  8:54 AM   Dressing Type Transparent 1/30/2018  8:54 AM   Hub Color/Line Status Infusing 1/30/2018  8:54 AM   Alcohol Cap Used Yes 1/30/2018  8:54 AM                       Dressing/Packing:  Wound Abdomen-DRESSING TYPE: Topical skin adhesive/glue (01/30/18 1100)  Splint/Cast:  ]    Other: SCD sleeves

## 2018-01-30 NOTE — PERIOP NOTES
TRANSFER - OUT REPORT:    Verbal report given to [address] RN Yuko(name) on Ashley Peak  being transferred to 624(unit) for routine post - op       Report consisted of patients Situation, Background, Assessment and   Recommendations(SBAR). Time Pre op antibiotic given:1158  Anesthesia Stop time: 2723  Klein Present on Transfer to floor:n  Order for Klein on Chart:n  Discharge Prescriptions with Chart:n    Information from the following report(s) SBAR, OR Summary, Intake/Output and MAR was reviewed with the receiving nurse. Opportunity for questions and clarification was provided. Is the patient on 02? NO       L/Min        Other     Is the patient on a monitor? NO    Is the nurse transporting with the patient? NO    Surgical Waiting Area notified of patient's transfer from PACU?  YES, via volunteer [facility] PACU

## 2018-01-30 NOTE — PROGRESS NOTES
Bedside and Verbal shift change report given to Ashley (oncoming nurse) by Elías Mauro (offgoing nurse). Report included the following information SBAR.

## 2018-01-30 NOTE — PROGRESS NOTES
TRANSFER - IN REPORT:    Verbal report received from Corina Meeks RN (name) on Wenceslao Luis  being received from PACU for routine post - op      Report consisted of patients Situation, Background, Assessment and   Recommendations(SBAR). Information from the following report(s) SBAR, Kardex, OR Summary, Procedure Summary, Intake/Output and MAR was reviewed with the receiving nurse. Opportunity for questions and clarification was provided. Assessment completed upon patients arrival to unit and care assumed.

## 2018-01-31 VITALS
DIASTOLIC BLOOD PRESSURE: 84 MMHG | SYSTOLIC BLOOD PRESSURE: 143 MMHG | HEART RATE: 59 BPM | BODY MASS INDEX: 37.12 KG/M2 | WEIGHT: 231 LBS | RESPIRATION RATE: 18 BRPM | OXYGEN SATURATION: 99 % | TEMPERATURE: 98.6 F | HEIGHT: 66 IN

## 2018-01-31 LAB
ALBUMIN SERPL-MCNC: 2.7 G/DL (ref 3.5–5)
ALBUMIN/GLOB SERPL: 0.7 {RATIO} (ref 1.1–2.2)
ALP SERPL-CCNC: 204 U/L (ref 45–117)
ALT SERPL-CCNC: 287 U/L (ref 12–78)
ANION GAP SERPL CALC-SCNC: 6 MMOL/L (ref 5–15)
AST SERPL-CCNC: 80 U/L (ref 15–37)
BASOPHILS # BLD: 0 K/UL (ref 0–0.1)
BASOPHILS NFR BLD: 0 % (ref 0–1)
BILIRUB SERPL-MCNC: 0.6 MG/DL (ref 0.2–1)
BUN SERPL-MCNC: 8 MG/DL (ref 6–20)
BUN/CREAT SERPL: 12 (ref 12–20)
CALCIUM SERPL-MCNC: 8.8 MG/DL (ref 8.5–10.1)
CHLORIDE SERPL-SCNC: 105 MMOL/L (ref 97–108)
CO2 SERPL-SCNC: 28 MMOL/L (ref 21–32)
CREAT SERPL-MCNC: 0.68 MG/DL (ref 0.55–1.02)
DIFFERENTIAL METHOD BLD: ABNORMAL
EOSINOPHIL # BLD: 0 K/UL (ref 0–0.4)
EOSINOPHIL NFR BLD: 0 % (ref 0–7)
ERYTHROCYTE [DISTWIDTH] IN BLOOD BY AUTOMATED COUNT: 12.5 % (ref 11.5–14.5)
GLOBULIN SER CALC-MCNC: 3.7 G/DL (ref 2–4)
GLUCOSE BLD STRIP.AUTO-MCNC: 119 MG/DL (ref 65–100)
GLUCOSE BLD STRIP.AUTO-MCNC: 127 MG/DL (ref 65–100)
GLUCOSE SERPL-MCNC: 127 MG/DL (ref 65–100)
HCT VFR BLD AUTO: 33.6 % (ref 35–47)
HGB BLD-MCNC: 11 G/DL (ref 11.5–16)
IMM GRANULOCYTES # BLD: 0 K/UL (ref 0–0.04)
IMM GRANULOCYTES NFR BLD AUTO: 0 % (ref 0–0.5)
LYMPHOCYTES # BLD: 1.4 K/UL (ref 0.8–3.5)
LYMPHOCYTES NFR BLD: 15 % (ref 12–49)
MCH RBC QN AUTO: 29.5 PG (ref 26–34)
MCHC RBC AUTO-ENTMCNC: 32.7 G/DL (ref 30–36.5)
MCV RBC AUTO: 90.1 FL (ref 80–99)
MONOCYTES # BLD: 0.6 K/UL (ref 0–1)
MONOCYTES NFR BLD: 7 % (ref 5–13)
NEUTS SEG # BLD: 7.2 K/UL (ref 1.8–8)
NEUTS SEG NFR BLD: 78 % (ref 32–75)
NRBC # BLD: 0 K/UL (ref 0–0.01)
NRBC BLD-RTO: 0 PER 100 WBC
PLATELET # BLD AUTO: 274 K/UL (ref 150–400)
PMV BLD AUTO: 11 FL (ref 8.9–12.9)
POTASSIUM SERPL-SCNC: 4.1 MMOL/L (ref 3.5–5.1)
PROT SERPL-MCNC: 6.4 G/DL (ref 6.4–8.2)
RBC # BLD AUTO: 3.73 M/UL (ref 3.8–5.2)
SERVICE CMNT-IMP: ABNORMAL
SERVICE CMNT-IMP: ABNORMAL
SODIUM SERPL-SCNC: 139 MMOL/L (ref 136–145)
WBC # BLD AUTO: 9.2 K/UL (ref 3.6–11)

## 2018-01-31 PROCEDURE — 74011250637 HC RX REV CODE- 250/637: Performed by: SURGERY

## 2018-01-31 PROCEDURE — 90686 IIV4 VACC NO PRSV 0.5 ML IM: CPT | Performed by: SURGERY

## 2018-01-31 PROCEDURE — 80053 COMPREHEN METABOLIC PANEL: CPT | Performed by: SURGERY

## 2018-01-31 PROCEDURE — 74011250636 HC RX REV CODE- 250/636: Performed by: SURGERY

## 2018-01-31 PROCEDURE — 85025 COMPLETE CBC W/AUTO DIFF WBC: CPT | Performed by: SURGERY

## 2018-01-31 PROCEDURE — 90471 IMMUNIZATION ADMIN: CPT

## 2018-01-31 PROCEDURE — 82962 GLUCOSE BLOOD TEST: CPT

## 2018-01-31 PROCEDURE — 36415 COLL VENOUS BLD VENIPUNCTURE: CPT | Performed by: SURGERY

## 2018-01-31 RX ORDER — HYDROCODONE BITARTRATE AND ACETAMINOPHEN 5; 325 MG/1; MG/1
1 TABLET ORAL
Qty: 30 TAB | Refills: 0 | Status: ON HOLD | OUTPATIENT
Start: 2018-01-31 | End: 2022-03-20

## 2018-01-31 RX ADMIN — HYDROCODONE BITARTRATE AND ACETAMINOPHEN 1 TABLET: 5; 325 TABLET ORAL at 05:15

## 2018-01-31 RX ADMIN — BENZOCAINE AND MENTHOL 1 LOZENGE: 15; 3.6 LOZENGE ORAL at 10:40

## 2018-01-31 RX ADMIN — HYDROCODONE BITARTRATE AND ACETAMINOPHEN 1 TABLET: 5; 325 TABLET ORAL at 10:18

## 2018-01-31 RX ADMIN — BENZOCAINE AND MENTHOL 1 LOZENGE: 15; 3.6 LOZENGE ORAL at 01:55

## 2018-01-31 RX ADMIN — ENOXAPARIN SODIUM 40 MG: 40 INJECTION SUBCUTANEOUS at 10:18

## 2018-01-31 RX ADMIN — BENZOCAINE AND MENTHOL 1 LOZENGE: 15; 3.6 LOZENGE ORAL at 06:46

## 2018-01-31 RX ADMIN — INFLUENZA VIRUS VACCINE 0.5 ML: 15; 15; 15; 15 SUSPENSION INTRAMUSCULAR at 10:18

## 2018-01-31 RX ADMIN — HYDROMORPHONE HYDROCHLORIDE 1 MG: 1 INJECTION, SOLUTION INTRAMUSCULAR; INTRAVENOUS; SUBCUTANEOUS at 01:55

## 2018-01-31 NOTE — PROGRESS NOTES
CM advised that patient will be discharged today. RICK met with patient and her  who confirmed that there are no discharge planning needs.

## 2018-01-31 NOTE — PROGRESS NOTES
Primary Nurse Krishan Dye and Hailee Berry RN performed a dual skin assessment on this patient Impairment noted- x4 lap sites on abdomen  Sunil score is 22

## 2018-01-31 NOTE — DISCHARGE INSTRUCTIONS
Call [phone] to schedule Surgery follow-up appointment for 2 weeks after procedure. Cholecystectomy: What to Expect at [facility]  After your surgery, it is normal to feel weak and tired for several days after you return home. Your belly may be swollen. If you had laparoscopic surgery, you may also have pain in your shoulder for about 24 hours. You may have gas or need to burp a lot at first, and a few people get diarrhea. The diarrhea usually goes away in 2 to 4 weeks, but it may last longer. How quickly you recover depends on whether you had a laparoscopic or open surgery. · For a laparoscopic surgery, most people can go back to work or their normal routine in 1 to 2 weeks, but it may take longer, depending on the type of work you do. · For an open surgery, it will probably take 4 to 6 weeks before you get back to your normal routine. This care sheet gives you a general idea about how long it will take for you to recover. However, each person recovers at a different pace. Follow the steps below to get better as quickly as possible. How can you care for yourself at home? Activity  ? · Rest when you feel tired. Getting enough sleep will help you recover. ? · Try to walk each day. Start out by walking a little more than you did the day before. Gradually increase the amount you walk. Walking boosts blood flow and helps prevent pneumonia and constipation. ? · For about 2 to 4 weeks, avoid lifting anything that would make you strain. This may include a child, heavy grocery bags and milk containers, a heavy briefcase or backpack, cat litter or dog food bags, or a vacuum . ? · Avoid strenuous activities, such as biking, jogging, weightlifting, and aerobic exercise, until your doctor says it is okay. ? · You may shower 24 hours after surgery. Pat the cuts (incisions) dry. Do not take a bath for the first 2 weeks, or until your doctor tells you it is okay.    ? · You may drive when you are no longer taking pain medicine and can quickly move your foot from the gas pedal to the brake. You must also be able to sit comfortably for a long period of time, even if you do not plan to go far. You might get caught in traffic. ? · For a laparoscopic surgery, most people can go back to work or their normal routine in 1 to 2 weeks, but it may take longer. For an open surgery, it will probably take 4 to 6 weeks before you get back to your normal routine. ? · Your doctor will tell you when you can have sex again. ? Diet  ? · Eat smaller meals more often instead of fewer larger meals. You can eat a normal diet, but avoid eating fatty foods for about 1 month. Fatty foods include hamburger, whole milk, cheese, and many snack foods. If your stomach is upset, try bland, low-fat foods like plain rice, broiled chicken, toast, and yogurt. ? · Drink plenty of fluids (unless your doctor tells you not to). ? · If you have diarrhea, try avoiding spicy foods, dairy products, fatty foods, and alcohol. You can also watch to see if specific foods cause it, and stop eating them. If the diarrhea continues for more than 2 weeks, talk to your doctor. ? · You may notice that your bowel movements are not regular right after your surgery. This is common. Try to avoid constipation and straining with bowel movements. You may want to take a fiber supplement every day. If you have not had a bowel movement after a couple of days, ask your doctor about taking a mild laxative. Medicines  ? · Your doctor will tell you if and when you can restart your medicines. He or she will also give you instructions about taking any new medicines. ? · If you take blood thinners, such as warfarin (Coumadin), clopidogrel (Plavix), or aspirin, be sure to talk to your doctor. He or she will tell you if and when to start taking those medicines again. Make sure that you understand exactly what your doctor wants you to do.    ? · Take pain medicines exactly as directed. ¨ If the doctor gave you a prescription medicine for pain, take it as prescribed. ¨ If you are not taking a prescription pain medicine, take an over-the-counter medicine such as acetaminophen (Tylenol), ibuprofen (Advil, Motrin), or naproxen (Aleve). Read and follow all instructions on the label. ¨ Do not take two or more pain medicines at the same time unless the doctor told you to. Many pain medicines contain acetaminophen, which is Tylenol. Too much Tylenol can be harmful. ? · If you think your pain medicine is making you sick to your stomach:  ¨ Take your medicine after meals (unless your doctor tells you not to). ¨ Ask your doctor for a different pain medicine. ? · If your doctor prescribed antibiotics, take them as directed. Do not stop taking them just because you feel better. You need to take the full course of antibiotics. Incision care  ? · If you have strips of tape on the incision, or cut, leave the tape on for a week or until it falls off.   ? · After 24 to 48 hours, wash the area daily with warm, soapy water, and pat it dry. ? · You may have staples to hold the cut together. Keep them dry until your doctor takes them out. This is usually in 7 to 10 days. ? · Keep the area clean and dry. You may cover it with a gauze bandage if it weeps or rubs against clothing. Change the bandage every day. ?Ice  ? · To reduce swelling and pain, put ice or a cold pack on your belly for 10 to 20 minutes at a time. Do this every 1 to 2 hours. Put a thin cloth between the ice and your skin. Follow-up care is a key part of your treatment and safety. Be sure to make and go to all appointments, and call your doctor if you are having problems. It's also a good idea to know your test results and keep a list of the medicines you take. When should you call for help? Call 911 anytime you think you may need emergency care. For example, call if:  ? · You passed out (lost consciousness).    ? · You are short of breath. Megane Khalil ? Call your doctor now or seek immediate medical care if:  ? · You are sick to your stomach and cannot drink fluids. ? · You have pain that does not get better when you take your pain medicine. ? · You cannot pass stools or gas. ? · You have signs of infection, such as:  ¨ Increased pain, swelling, warmth, or redness. ¨ Red streaks leading from the incision. ¨ Pus draining from the incision. ¨ A fever. ? · Bright red blood has soaked through the bandage over your incision. ? · You have loose stitches, or your incision comes open. ? · You have signs of a blood clot in your leg (called a deep vein thrombosis), such as:  ¨ Pain in your calf, back of knee, thigh, or groin. ¨ Redness and swelling in your leg or groin. ? Watch closely for any changes in your health, and be sure to contact your doctor if you have any problems. Where can you learn more? Go to http://cassandra-ary.info/. Enter 726 [id number] in the search box to learn more about \"Cholecystectomy: What to Expect at Home. \"  Current as of: May 12, 2017  Content Version: 11.4  © 5521-4282 [employer]. Care instructions adapted under license by [employer] (which disclaims liability or warranty for this information). If you have questions about a medical condition or this instruction, always ask your healthcare professional. [place] [address] any warranty or liability for your use of this information.

## 2018-01-31 NOTE — PROGRESS NOTES
Bedside shift change report given to Beatris Millan RN (oncoming nurse) by Carina Galo RN (offgoing nurse). Report included the following information SBAR, OR Summary, Intake/Output, MAR and Recent Results. 2050 Notified Dr Wenceslao Luis of patient's complaint of sore throat after surgery and of her request for a lozenge. MD ordered Cepacol every 4 hours as needed for sore throat pain and requested that Nursing enter order for him. Will do so.

## 2018-01-31 NOTE — PROGRESS NOTES
Progress Note    Patient: Ashley Hammond MRN: [id number]  SSN: [id number]    YOB: 1965  Age: 46 y.o. Sex: female      Admit Date: 2018    1 Day Post-Op    Procedure:  Procedure(s):  CHOLECYSTECTOMY LAPAROSCOPIC    Subjective:     Patient feels good; tolerating diet, ambulating. Objective:     Visit Vitals    /67 (BP 1 Location: Left arm, BP Patient Position: At rest)    Pulse 70    Temp 98.4 °F (36.9 °C)    Resp 16    Ht 5' 6\" (1.676 m)    Wt 231 lb (104.8 kg)    SpO2 98%    BMI 37.28 kg/m2       Temp (24hrs), Av.7 °F (36.5 °C), Min:96.1 °F (35.6 °C), Max:98.5 °F (36.9 °C)      Physical Exam:    ABDOMEN: Non-distended, wounds dry and intact. Data Review: VS, I/O's, Labs    Lab Review:   Recent Results (from the past 12 hour(s))   GLUCOSE, POC    Collection Time: 18 10:07 PM   Result Value Ref Range    Glucose (POC) 170 (H) 65 - 100 mg/dL    Performed by Millie Octave    METABOLIC PANEL, COMPREHENSIVE    Collection Time: 18  5:21 AM   Result Value Ref Range    Sodium 139 136 - 145 mmol/L    Potassium 4.1 3.5 - 5.1 mmol/L    Chloride 105 97 - 108 mmol/L    CO2 28 21 - 32 mmol/L    Anion gap 6 5 - 15 mmol/L    Glucose 127 (H) 65 - 100 mg/dL    BUN 8 6 - 20 MG/DL    Creatinine 0.68 0.55 - 1.02 MG/DL    BUN/Creatinine ratio 12 12 - 20      GFR est AA >60 >60 ml/min/1.73m2    GFR est non-AA >60 >60 ml/min/1.73m2    Calcium 8.8 8.5 - 10.1 MG/DL    Bilirubin, total 0.6 0.2 - 1.0 MG/DL    ALT (SGPT) 287 (H) 12 - 78 U/L    AST (SGOT) 80 (H) 15 - 37 U/L    Alk.  phosphatase 204 (H) 45 - 117 U/L    Protein, total 6.4 6.4 - 8.2 g/dL    Albumin 2.7 (L) 3.5 - 5.0 g/dL    Globulin 3.7 2.0 - 4.0 g/dL    A-G Ratio 0.7 (L) 1.1 - 2.2     CBC WITH AUTOMATED DIFF    Collection Time: 18  5:21 AM   Result Value Ref Range    WBC 9.2 3.6 - 11.0 K/uL    RBC 3.73 (L) 3.80 - 5.20 M/uL    HGB 11.0 (L) 11.5 - 16.0 g/dL    HCT 33.6 (L) 35.0 - 47.0 %    MCV 90.1 80.0 - 99.0 FL    MCH 29.5 26.0 - 34.0 PG    MCHC 32.7 30.0 - 36.5 g/dL    RDW 12.5 11.5 - 14.5 %    PLATELET 245 064 - 386 K/uL    MPV 11.0 8.9 - 12.9 FL    NRBC 0.0 0  WBC    ABSOLUTE NRBC 0.00 0.00 - 0.01 K/uL    NEUTROPHILS 78 (H) 32 - 75 %    LYMPHOCYTES 15 12 - 49 %    MONOCYTES 7 5 - 13 %    EOSINOPHILS 0 0 - 7 %    BASOPHILS 0 0 - 1 %    IMMATURE GRANULOCYTES 0 0.0 - 0.5 %    ABS. NEUTROPHILS 7.2 1.8 - 8.0 K/UL    ABS. LYMPHOCYTES 1.4 0.8 - 3.5 K/UL    ABS. MONOCYTES 0.6 0.0 - 1.0 K/UL    ABS. EOSINOPHILS 0.0 0.0 - 0.4 K/UL    ABS. BASOPHILS 0.0 0.0 - 0.1 K/UL    ABS. IMM. GRANS. 0.0 0.00 - 0.04 K/UL    DF AUTOMATED     GLUCOSE, POC    Collection Time: 01/31/18  6:27 AM   Result Value Ref Range    Glucose (POC) 127 (H) 65 - 100 mg/dL    Performed by Diane Davis          Assessment:     Hospital Problems  Date Reviewed: 12/23/2017          Codes Class Noted POA    Biliary calculus with obstruction without cholecystitis ICD-10-CM: K80.71  ICD-9-CM: 574.91  1/28/2018 Unknown              Plan/Recommendations/Medical Decision Making:     Discharge to home.     Signed By: Segun Mcdonald MD     January 31, 2018

## 2018-01-31 NOTE — PROGRESS NOTES
Bedside shift change report given to Geraldine Malcolm RN (oncoming nurse) by Alice Marroquin RN (offgoing nurse). Report included the following information SBAR, Kardex and Recent Results.

## 2018-02-02 DIAGNOSIS — K59.01 CONSTIPATION DUE TO SLOW TRANSIT: Primary | ICD-10-CM

## 2018-02-02 RX ORDER — DOCUSATE SODIUM 100 MG/1
100 CAPSULE, LIQUID FILLED ORAL 2 TIMES DAILY
Qty: 60 CAP | Refills: 2 | Status: SHIPPED | OUTPATIENT
Start: 2018-02-02 | End: 2018-05-03

## 2018-02-05 NOTE — DISCHARGE SUMMARY
Physician Discharge Summary     Patient ID:  Michelle Barakat  [id number]  83 y.o.  1965    Allergies: Apple    Admit Date: 1/28/2018    Discharge Date: 1/31/2018    * Admission Diagnoses: Biliary calculus with obstruction without cholecystitis;[facility]*    * Discharge Diagnoses:    Hospital Problems as of 1/31/2018  Date Reviewed: 12/23/2017          Codes Class Noted - Resolved POA    Biliary calculus with obstruction without cholecystitis ICD-10-CM: K80.71  ICD-9-CM: 574.91  1/28/2018 - Present Unknown               Admission Condition: Fair    * Discharge Condition: good    * Procedures: Procedure(s):  CHOLECYSTECTOMY LAPAROSCOPIC    * Hospital Course:   She was admitted and resuscitated; pain/nausea resolved. Bilirubin increased on 1/29, but MRCP negative for CBD stone. She underwent uncomplicated laparoscopic cholecystectomy on 1/30; she was discharged in good condition on 1/31. Consults: Gastroenterology    Significant Diagnostic Studies: radiology: MRI: no CBD stones    * Disposition: Home    Discharge Medications:   Discharge Medication List as of 1/31/2018 11:10 AM      START taking these medications    Details   HYDROcodone-acetaminophen (NORCO) 5-325 mg per tablet Take 1 Tab by mouth every four (4) hours as needed. Max Daily Amount: 6 Tabs., Print, Disp-30 Tab, R-0         CONTINUE these medications which have NOT CHANGED    Details   CALCIUM CARB/MAGNESIUM CARB (CALCIUM & MAGNESIUM CARBONATES PO) Take 1 Tab by mouth daily. , Historical Med      albuterol (PROVENTIL HFA, VENTOLIN HFA, PROAIR HFA) 90 mcg/actuation inhaler Take 2 Puffs by inhalation every four (4) hours as needed for Wheezing., Normal, Disp-1 Inhaler, R-1             * Follow-up Care/Patient Instructions:   Activity: No heavy lifting, pushing, pulling x 2 weeks  Diet: Regular Diet  Wound Care: Keep wounds clean and dry    Follow-up Information     Follow up With Details [place] [address]   [address] [place]  [phone]          Follow-up tests/labs: Surgery follow-up in 2 weeks      Signed:  Irene Browning MD  2/5/2018  11:20 AM

## 2018-02-13 ENCOUNTER — OFFICE VISIT (OUTPATIENT)
Dept: SURGERY | Age: 53
End: 2018-02-13

## 2018-02-13 VITALS
OXYGEN SATURATION: 98 % | TEMPERATURE: 98.4 F | BODY MASS INDEX: 37.69 KG/M2 | RESPIRATION RATE: 20 BRPM | HEIGHT: 66 IN | DIASTOLIC BLOOD PRESSURE: 80 MMHG | WEIGHT: 234.5 LBS | SYSTOLIC BLOOD PRESSURE: 120 MMHG | HEART RATE: 61 BPM

## 2018-02-13 DIAGNOSIS — Z09 POSTOPERATIVE EXAMINATION: Primary | ICD-10-CM

## 2018-02-13 NOTE — PROGRESS NOTES
1. Have you been to the ER, urgent care clinic since your last visit? Hospitalized since your last visit? No    2. Have you seen or consulted any other health care providers outside of the [facility] since your last visit? Include any pap smears or colon screening.  No

## 2018-02-13 NOTE — PATIENT INSTRUCTIONS
Cholecystectomy: What to Expect at [address]  After your surgery, it is normal to feel weak and tired for several days after you return home. Your belly may be swollen. If you had laparoscopic surgery, you may also have pain in your shoulder for about 24 hours. You may have gas or need to burp a lot at first, and a few people get diarrhea. The diarrhea usually goes away in 2 to 4 weeks, but it may last longer. How quickly you recover depends on whether you had a laparoscopic or open surgery. · For a laparoscopic surgery, most people can go back to work or their normal routine in 1 to 2 weeks, but it may take longer, depending on the type of work you do. · For an open surgery, it will probably take 4 to 6 weeks before you get back to your normal routine. This care sheet gives you a general idea about how long it will take for you to recover. However, each person recovers at a different pace. Follow the steps below to get better as quickly as possible. How can you care for yourself at home? Activity  ? · Rest when you feel tired. Getting enough sleep will help you recover. ? · Try to walk each day. Start out by walking a little more than you did the day before. Gradually increase the amount you walk. Walking boosts blood flow and helps prevent pneumonia and constipation. ? · For about 2 to 4 weeks, avoid lifting anything that would make you strain. This may include a child, heavy grocery bags and milk containers, a heavy briefcase or backpack, cat litter or dog food bags, or a vacuum . ? · Avoid strenuous activities, such as biking, jogging, weightlifting, and aerobic exercise, until your doctor says it is okay. ? · You may shower 24 to 48 hours after surgery, if your doctor okays it. Pat the cut (incision) dry. Do not take a bath for the first 2 weeks, or until your doctor tells you it is okay.    ? · You may drive when you are no longer taking pain medicine and can quickly move your foot from the gas pedal to the brake. You must also be able to sit comfortably for a long period of time, even if you do not plan to go far. You might get caught in traffic. ? · For a laparoscopic surgery, most people can go back to work or their normal routine in 1 to 2 weeks, but it may take longer. For an open surgery, it will probably take 4 to 6 weeks before you get back to your normal routine. ? · Your doctor will tell you when you can have sex again. ? Diet  ? · Eat smaller meals more often instead of fewer larger meals. You can eat a normal diet, but avoid eating fatty foods for about 1 month. Fatty foods include hamburger, whole milk, cheese, and many snack foods. If your stomach is upset, try bland, low-fat foods like plain rice, broiled chicken, toast, and yogurt. ? · Drink plenty of fluids (unless your doctor tells you not to). ? · If you have diarrhea, try avoiding spicy foods, dairy products, fatty foods, and alcohol. You can also watch to see if specific foods cause it, and stop eating them. If the diarrhea continues for more than 2 weeks, talk to your doctor. ? · You may notice that your bowel movements are not regular right after your surgery. This is common. Try to avoid constipation and straining with bowel movements. You may want to take a fiber supplement every day. If you have not had a bowel movement after a couple of days, ask your doctor about taking a mild laxative. Medicines  ? · Your doctor will tell you if and when you can restart your medicines. He or she will also give you instructions about taking any new medicines. ? · If you take blood thinners, such as warfarin (Coumadin), clopidogrel (Plavix), or aspirin, be sure to talk to your doctor. He or she will tell you if and when to start taking those medicines again. Make sure that you understand exactly what your doctor wants you to do. ? · Take pain medicines exactly as directed.   ¨ If the doctor gave you a prescription medicine for pain, take it as prescribed. ¨ If you are not taking a prescription pain medicine, take an over-the-counter medicine such as acetaminophen (Tylenol), ibuprofen (Advil, Motrin), or naproxen (Aleve). Read and follow all instructions on the label. ¨ Do not take two or more pain medicines at the same time unless the doctor told you to. Many pain medicines contain acetaminophen, which is Tylenol. Too much Tylenol can be harmful. ? · If you think your pain medicine is making you sick to your stomach:  ¨ Take your medicine after meals (unless your doctor tells you not to). ¨ Ask your doctor for a different pain medicine. ? · If your doctor prescribed antibiotics, take them as directed. Do not stop taking them just because you feel better. You need to take the full course of antibiotics. Incision care  ? · If you have strips of tape on the incision, or cut, leave the tape on for a week or until it falls off.   ? · After 24 to 48 hours, wash the area daily with warm, soapy water, and pat it dry. ? · You may have staples to hold the cut together. Keep them dry until your doctor takes them out. This is usually in 7 to 10 days. ? · Keep the area clean and dry. You may cover it with a gauze bandage if it weeps or rubs against clothing. Change the bandage every day. ?Ice  ? · To reduce swelling and pain, put ice or a cold pack on your belly for 10 to 20 minutes at a time. Do this every 1 to 2 hours. Put a thin cloth between the ice and your skin. Follow-up care is a key part of your treatment and safety. Be sure to make and go to all appointments, and call your doctor if you are having problems. It's also a good idea to know your test results and keep a list of the medicines you take. When should you call for help? Call 911 anytime you think you may need emergency care. For example, call if:  ? · You passed out (lost consciousness). ? · You are short of breath. Arvid Chasidy ? Call your doctor now or seek immediate medical care if:  ? · You are sick to your stomach and cannot drink fluids. ? · You have pain that does not get better when you take your pain medicine. ? · You cannot pass stools or gas. ? · You have signs of infection, such as:  ¨ Increased pain, swelling, warmth, or redness. ¨ Red streaks leading from the incision. ¨ Pus draining from the incision. ¨ A fever. ? · Bright red blood has soaked through the bandage over your incision. ? · You have loose stitches, or your incision comes open. ? · You have signs of a blood clot in your leg (called a deep vein thrombosis), such as:  ¨ Pain in your calf, back of knee, thigh, or groin. ¨ Redness and swelling in your leg or groin. ? Watch closely for any changes in your health, and be sure to contact your doctor if you have any problems. Where can you learn more? Go to http://cassandra-ary.info/. Enter 307 64 466 in the search box to learn more about \"Cholecystectomy: What to Expect at Home. \"  Current as of: May 12, 2017  Content Version: 11.4  © 6031-1989 [employer]. Care instructions adapted under license by [employer] (which disclaims liability or warranty for this information). If you have questions about a medical condition or this instruction, always ask your healthcare professional. [address] any warranty or liability for your use of this information.

## 2018-02-13 NOTE — LETTER
NOTIFICATION OF RETURN TO WORK  
 
2/13/2018 11:19 AM 
 
Ms. Dexter Renae [address] [facility] [address] [facility] [facility] [address] Roxi Mantilla To Whom It May Concern: 
 
Dexter Renae was under the care of [facility] [phone] from 1/28/18 to 1/31/18. She will be able to return to work on 2/16/18 with no restrictions. If there are questions or concerns please have the patient contact our office.  
 
Sincerely, 
 
 
Tasha Chaney MD

## 2018-02-13 NOTE — PROGRESS NOTES
Subjective:      Adelita Miller is a 46 y.o. female presents for postop care 14 days following laparoscopic cholecystectomy. Appetite is good. Eating a regular diet without difficulty. Bowel movements are loose. She notes pain at umbilical incision which is improving; no fever, chills, or wound drainage. Objective:     Visit Vitals    /80    Pulse 61    Temp 98.4 °F (36.9 °C)    Resp 20    Ht 5' 6\" (1.676 m)    Wt 234 lb 8 oz (106.4 kg)    SpO2 98%    BMI 37.85 kg/m2       General:  alert, cooperative, no distress, appears stated age, moderately obese   Abdomen: soft, no hernias, appropriate incisional pain with palpation   Incision:   healing well, no drainage, no erythema, no hernia, no seroma, no swelling, no dehiscence, incision well approximated     Assessment:     Doing well postoperatively. Plan:     1. Continue current medications. 2. Return to work 2/16. 3. Pt is to increase activities as tolerated.

## 2018-02-15 DIAGNOSIS — R79.89 ABNORMAL LIVER FUNCTION TEST: Primary | ICD-10-CM

## 2018-02-16 ENCOUNTER — TELEPHONE (OUTPATIENT)
Dept: SURGERY | Age: 53
End: 2018-02-16

## 2018-02-16 NOTE — TELEPHONE ENCOUNTER
I called the patient and she said she did not get paid and she said it is because of our paperwork. I spoke to [facility] our Medical Records person and I told the patient Héctor will call her back. Pt said we have not sent Medical records. Pt in agreement.

## 2018-02-16 NOTE — TELEPHONE ENCOUNTER
Patient called because her disability forms were filled out incorrectly and is interfering with her pay.

## 2018-02-22 ENCOUNTER — TELEPHONE (OUTPATIENT)
Dept: SURGERY | Age: 53
End: 2018-02-22

## 2018-02-22 NOTE — TELEPHONE ENCOUNTER
I called the patient and she said Alyssa Hager has denied her time out from work. I read to her what was put on her form and she also received a letter from Dr Isabel Briggs as well. She said she is filing an appeal and will let me know if she needs anything further.

## 2018-03-01 ENCOUNTER — OFFICE VISIT (OUTPATIENT)
Dept: SURGERY | Age: 53
End: 2018-03-01

## 2018-03-01 VITALS
RESPIRATION RATE: 20 BRPM | SYSTOLIC BLOOD PRESSURE: 140 MMHG | DIASTOLIC BLOOD PRESSURE: 86 MMHG | WEIGHT: 235.5 LBS | BODY MASS INDEX: 37.85 KG/M2 | HEIGHT: 66 IN

## 2018-03-01 DIAGNOSIS — M54.6 ACUTE MIDLINE THORACIC BACK PAIN: ICD-10-CM

## 2018-03-01 DIAGNOSIS — Z09 POSTOPERATIVE EXAMINATION: Primary | ICD-10-CM

## 2018-03-01 NOTE — LETTER
3/1/2018 3:10 PM 
 
Ms. Marimar Ornelas [address] [facility] [address] [facility] [id number] Jazmin Munson To Whom It May Concern: 
 
Marimar Ornelas was under the care of [facility] [phone] from 1/28/18 to 1/31/18; laparoscopic cholecystectomy was performed on 1/30/18. In my medical opinion, the patient should be excused from work duties for 2 weeks following laparoscopic cholecystectomy. This time period allows wounds to heal appropriately. Additionally, the patient required daily narcotic analgesics for 15 days following the procedure, which in any event would preclude same driving and job performance. If there are questions or concerns please have the patient contact our office.  
 
Sincerely, 
 
 
Jeniffer August MD

## 2018-03-01 NOTE — PROGRESS NOTES
1. Have you been to the ER, urgent care clinic since your last visit? Hospitalized since your last visit? No    2. Have you seen or consulted any other health care providers outside of the [address] [place] [address] since your last visit? Include any pap smears or colon screening.  No

## 2018-03-01 NOTE — LETTER
3/1/2018 3:18 PM 
 
Ms. Cori Espitia [address] [facility] [address] [facility] [id number] Starr Reyes To Whom It May Concern: 
 
Cori Espitia was under the care of [facility] [phone] from 1/28/18 to 1/31/18; laparoscopic cholecystectomy was performed on 1/30/18. Following hospital discharge, we requested that she be excused from work until 1/16/18. In my medical opinion, the patient should be excused from work duties for 2 weeks following laparoscopic cholecystectomy. This time period allows wounds to heal appropriately. Additionally, the patient required daily narcotic analgesics for 15 days following the procedure, which in any event would preclude safe job performance. If there are questions or concerns please have the patient contact our office.  
 
Sincerely, 
 
 
Alphonso Workman MD

## 2018-03-02 NOTE — PATIENT INSTRUCTIONS
Cholecystectomy: What to Expect at [address] [place] [address]  After your surgery, it is normal to feel weak and tired for several days after you return home. Your belly may be swollen. If you had laparoscopic surgery, you may also have pain in your shoulder for about 24 hours. You may have gas or need to burp a lot at first, and a few people get diarrhea. The diarrhea usually goes away in 2 to 4 weeks, but it may last longer. How quickly you recover depends on whether you had a laparoscopic or open surgery. · For a laparoscopic surgery, most people can go back to work or their normal routine in 1 to 2 weeks, but it may take longer, depending on the type of work you do. · For an open surgery, it will probably take 4 to 6 weeks before you get back to your normal routine. This care sheet gives you a general idea about how long it will take for you to recover. However, each person recovers at a different pace. Follow the steps below to get better as quickly as possible. How can you care for yourself at home? Activity  ? · Rest when you feel tired. Getting enough sleep will help you recover. ? · Try to walk each day. Start out by walking a little more than you did the day before. Gradually increase the amount you walk. Walking boosts blood flow and helps prevent pneumonia and constipation. ? · For about 2 to 4 weeks, avoid lifting anything that would make you strain. This may include a child, heavy grocery bags and milk containers, a heavy briefcase or backpack, cat litter or dog food bags, or a vacuum . ? · Avoid strenuous activities, such as biking, jogging, weightlifting, and aerobic exercise, until your doctor says it is okay. ? · You may shower 24 to 48 hours after surgery, if your doctor okays it. Pat the cut (incision) dry. Do not take a bath for the first 2 weeks, or until your doctor tells you it is okay.    ? · You may drive when you are no longer taking pain medicine and can quickly move your foot from the gas pedal to the brake. You must also be able to sit comfortably for a long period of time, even if you do not plan to go far. You might get caught in traffic. ? · For a laparoscopic surgery, most people can go back to work or their normal routine in 1 to 2 weeks, but it may take longer. For an open surgery, it will probably take 4 to 6 weeks before you get back to your normal routine. ? · Your doctor will tell you when you can have sex again. ? Diet  ? · Eat smaller meals more often instead of fewer larger meals. You can eat a normal diet, but avoid eating fatty foods for about 1 month. Fatty foods include hamburger, whole milk, cheese, and many snack foods. If your stomach is upset, try bland, low-fat foods like plain rice, broiled chicken, toast, and yogurt. ? · Drink plenty of fluids (unless your doctor tells you not to). ? · If you have diarrhea, try avoiding spicy foods, dairy products, fatty foods, and alcohol. You can also watch to see if specific foods cause it, and stop eating them. If the diarrhea continues for more than 2 weeks, talk to your doctor. ? · You may notice that your bowel movements are not regular right after your surgery. This is common. Try to avoid constipation and straining with bowel movements. You may want to take a fiber supplement every day. If you have not had a bowel movement after a couple of days, ask your doctor about taking a mild laxative. Medicines  ? · Your doctor will tell you if and when you can restart your medicines. He or she will also give you instructions about taking any new medicines. ? · If you take blood thinners, such as warfarin (Coumadin), clopidogrel (Plavix), or aspirin, be sure to talk to your doctor. He or she will tell you if and when to start taking those medicines again. Make sure that you understand exactly what your doctor wants you to do. ? · Take pain medicines exactly as directed.   ¨ If the doctor gave you a prescription medicine for pain, take it as prescribed. ¨ If you are not taking a prescription pain medicine, take an over-the-counter medicine such as acetaminophen (Tylenol), ibuprofen (Advil, Motrin), or naproxen (Aleve). Read and follow all instructions on the label. ¨ Do not take two or more pain medicines at the same time unless the doctor told you to. Many pain medicines contain acetaminophen, which is Tylenol. Too much Tylenol can be harmful. ? · If you think your pain medicine is making you sick to your stomach:  ¨ Take your medicine after meals (unless your doctor tells you not to). ¨ Ask your doctor for a different pain medicine. ? · If your doctor prescribed antibiotics, take them as directed. Do not stop taking them just because you feel better. You need to take the full course of antibiotics. Incision care  ? · If you have strips of tape on the incision, or cut, leave the tape on for a week or until it falls off.   ? · After 24 to 48 hours, wash the area daily with warm, soapy water, and pat it dry. ? · You may have staples to hold the cut together. Keep them dry until your doctor takes them out. This is usually in 7 to 10 days. ? · Keep the area clean and dry. You may cover it with a gauze bandage if it weeps or rubs against clothing. Change the bandage every day. ?Ice  ? · To reduce swelling and pain, put ice or a cold pack on your belly for 10 to 20 minutes at a time. Do this every 1 to 2 hours. Put a thin cloth between the ice and your skin. Follow-up care is a key part of your treatment and safety. Be sure to make and go to all appointments, and call your doctor if you are having problems. It's also a good idea to know your test results and keep a list of the medicines you take. When should you call for help? Call 911 anytime you think you may need emergency care. For example, call if:  ? · You passed out (lost consciousness). ? · You are short of breath. Sarah Khalil ? Call your doctor now or seek immediate medical care if:  ? · You are sick to your stomach and cannot drink fluids. ? · You have pain that does not get better when you take your pain medicine. ? · You cannot pass stools or gas. ? · You have signs of infection, such as:  ¨ Increased pain, swelling, warmth, or redness. ¨ Red streaks leading from the incision. ¨ Pus draining from the incision. ¨ A fever. ? · Bright red blood has soaked through the bandage over your incision. ? · You have loose stitches, or your incision comes open. ? · You have signs of a blood clot in your leg (called a deep vein thrombosis), such as:  ¨ Pain in your calf, back of knee, thigh, or groin. ¨ Redness and swelling in your leg or groin. ? Watch closely for any changes in your health, and be sure to contact your doctor if you have any problems. Where can you learn more? Go to http://cassandra-ary.info/. Enter 698 48 134 in the search box to learn more about \"Cholecystectomy: What to Expect at Home. \"  Current as of: May 12, 2017  Content Version: 11.4  © 8615-3464 [employer]. Care instructions adapted under license by [employer] (which disclaims liability or warranty for this information). If you have questions about a medical condition or this instruction, always ask your healthcare professional. [address] any warranty or liability for your use of this information.

## 2018-03-03 NOTE — PROGRESS NOTES
Subjective:      Clau Zazueta is a 46 y.o. female presents for postop care 4 weeks following laparoscopic cholecystectomy. Appetite is good. Eating a regular diet without difficulty. Bowel movements are regular. The patient is voiding without difficulty. She still complains of jyoti-umbilical pain, which is improving. She notes back pain and LLQ pain. No fever, chills, or jaundice. Objective:     Visit Vitals    /86    Resp 20    Ht 5' 6\" (1.676 m)    Wt 235 lb 8 oz (106.8 kg)    BMI 38.01 kg/m2       General:  alert, cooperative, no distress, appears stated age, morbidly obese   Abdomen: soft, non-tender, no hernias   Incision:   healing well, no drainage, no erythema, no hernia, no seroma, no swelling, no dehiscence, incision well approximated     Assessment:     Doing well postoperatively. Back pain is reproducible; do not think it is resulting from intra-abdominal process. Plan:     1. Continue current medications. 2. Diet as tolerated. 3. Pt is to increase activities as tolerated. 4. PT consult.

## 2019-05-28 ENCOUNTER — OFFICE VISIT (OUTPATIENT)
Dept: FAMILY MEDICINE CLINIC | Age: 54
End: 2019-05-28

## 2019-05-28 VITALS
HEIGHT: 66 IN | DIASTOLIC BLOOD PRESSURE: 73 MMHG | SYSTOLIC BLOOD PRESSURE: 134 MMHG | OXYGEN SATURATION: 98 % | HEART RATE: 62 BPM | TEMPERATURE: 98.4 F | WEIGHT: 199 LBS | RESPIRATION RATE: 18 BRPM | BODY MASS INDEX: 31.98 KG/M2

## 2019-05-28 DIAGNOSIS — Z98.84 S/P BARIATRIC SURGERY: ICD-10-CM

## 2019-05-28 DIAGNOSIS — M25.562 CHRONIC PAIN OF LEFT KNEE: ICD-10-CM

## 2019-05-28 DIAGNOSIS — M25.551 PAIN OF RIGHT HIP JOINT: ICD-10-CM

## 2019-05-28 DIAGNOSIS — G89.29 CHRONIC PAIN OF LEFT KNEE: ICD-10-CM

## 2019-05-28 DIAGNOSIS — E11.9 CONTROLLED TYPE 2 DIABETES MELLITUS WITHOUT COMPLICATION, WITHOUT LONG-TERM CURRENT USE OF INSULIN (HCC): ICD-10-CM

## 2019-05-28 DIAGNOSIS — Z00.00 PHYSICAL EXAM: Primary | ICD-10-CM

## 2019-05-28 DIAGNOSIS — G47.33 OSA (OBSTRUCTIVE SLEEP APNEA): ICD-10-CM

## 2019-05-28 DIAGNOSIS — Z13.220 SCREENING FOR HYPERLIPIDEMIA: ICD-10-CM

## 2019-05-28 RX ORDER — LIDOCAINE 50 MG/G
PATCH TOPICAL
Qty: 1 EACH | Refills: 0 | Status: SHIPPED | OUTPATIENT
Start: 2019-05-28 | End: 2019-05-28

## 2019-05-28 RX ORDER — BISMUTH SUBSALICYLATE 262 MG
1 TABLET,CHEWABLE ORAL DAILY
COMMUNITY

## 2019-05-28 RX ORDER — LIDOCAINE 50 MG/G
PATCH TOPICAL
Qty: 30 EACH | Refills: 11 | Status: SHIPPED | OUTPATIENT
Start: 2019-05-28 | End: 2019-12-31 | Stop reason: SDUPTHER

## 2019-05-28 NOTE — PROGRESS NOTES
1. Have you been to the ER, urgent care clinic since your last visit? Hospitalized since your last visit? No    2. Have you seen or consulted any other health care providers outside of the [address] [place] [address] since your last visit? Include any pap smears or colon screening.  Dr. Tiffanie Patel,       Chief Complaint   Patient presents with    Complete Physical

## 2019-05-28 NOTE — PROGRESS NOTES
Chief Complaint   Patient presents with    Complete Physical     she is a 48y.o. year old female who presents for evalution. She c/o pain in the right hip and right lower back . She is pointing to the right hp and right SI area  She has no tenderness  Has a h/o pain in the hip in the past which require HC in the past,   In the past this was coming and going but is lasting longer than usual this time  This time it started a month ago  She had a gastric sleeve and cannot take nsaids  She has tried tylenol and that does not touch it at all    Also has left knee pain which is chronic          Reviewed PmHx, RxHx, FmHx, SocHx, AllgHx and updated and dated in the chart. Aspirin yes ____   No____ N/A____    Patient Active Problem List    Diagnosis    Biliary calculus with obstruction without cholecystitis    Controlled type 2 diabetes mellitus without complication, without long-term current use of insulin ([address]    BREE (obstructive sleep apnea)    Diabetes mellitus type 2, controlled ([address]    Obesity (BMI 30-39. 9)    Prediabetes    New daily persistent headache    GERD (gastroesophageal reflux disease)    Chronic back pain    Overweight(278.02)    Fatigue       Nurse notes were reviewed and copied and are correct  Review of Systems - negative except as listed above in the HPI    Objective:     Vitals:    05/28/19 0824   BP: 134/73   Pulse: 62   Resp: 18   Temp: 98.4 °F (36.9 °C)   TempSrc: Oral   SpO2: 98%   Weight: 199 lb (90.3 kg)   Height: 5' 6\" (1.676 m)     Physical Examination: General appearance - alert, well appearing, and in no distress and oriented to person, place, and time  Mental status - alert, oriented to person, place, and time  Ears - bilateral TM's and external ear canals normal  Mouth - mucous membranes moist, pharynx normal without lesions  Neck - supple, no significant adenopathy  Chest - clear to auscultation, no wheezes, rales or rhonchi, symmetric air entry  Heart - normal rate, regular rhythm, normal S1, S2, no murmurs, rubs, clicks or gallops  Abdomen - soft, nontender, nondistended, no masses or organomegaly  Neurological - alert, oriented, normal speech, no focal findings or movement disorder noted  Musculoskeletal - no joint tenderness,   Crepitus on left knee  Extremities - peripheral pulses normal, no pedal edema, no clubbing or cyanosis  Skin - normal coloration and turgor, no rashes, no suspicious skin lesions noted         Assessment/ Plan:   Diagnoses and all orders for this visit:    1. Physical exam  -     METABOLIC PANEL, COMPREHENSIVE    2. Pain of right hip joint  -     REFERRAL TO PHYSICAL THERAPY  -     lidocaine (LIDODERM) 5 %; Apply patch to the affected area for 12 hours a day and remove for 12 hours a day. 3. Chronic pain of left knee  -     REFERRAL TO PHYSICAL THERAPY  -     lidocaine (LIDODERM) 5 %; Apply patch to the affected area for 12 hours a day and remove for 12 hours a day. 4. Controlled type 2 diabetes mellitus without complication, without long-term current use of insulin (HCC)  -     HEMOGLOBIN A1C WITH EAG    5. BREE (obstructive sleep apnea)    6. Screening for hyperlipidemia  -     LIPID PANEL    7. S/P bariatric surgery  -     VITAMIN D, 25 HYDROXY  -     VITAMIN B12  -     ergocalciferol (ERGOCALCIFEROL) 50,000 unit capsule; Take 1 Cap by mouth every seven (7) days. Other orders  -     CVD REPORT  -     DIABETES PATIENT EDUCATION       Follow-up and Dispositions    · Return in about 6 months (around 11/28/2019). ICD-10-CM ICD-9-CM    1. Physical exam N22.54 K24.0 METABOLIC PANEL, COMPREHENSIVE   2. Pain of right hip joint M25.551 719.45 REFERRAL TO PHYSICAL THERAPY      lidocaine (LIDODERM) 5 %      DISCONTINUED: lidocaine (LIDODERM) 5 %   3. Chronic pain of left knee M25.562 719.46 REFERRAL TO PHYSICAL THERAPY    G89.29 338.29 lidocaine (LIDODERM) 5 %      DISCONTINUED: lidocaine (LIDODERM) 5 %   4.  Controlled type 2 diabetes mellitus without complication, without long-term current use of insulin (HCC) E11.9 250.00 HEMOGLOBIN A1C WITH EAG   5. BREE (obstructive sleep apnea) G47.33 327.23    6. Screening for hyperlipidemia Z13.220 V77.91 LIPID PANEL   7. S/P bariatric surgery Z98.84 V45.86 VITAMIN D, 25 HYDROXY      VITAMIN B12      ergocalciferol (ERGOCALCIFEROL) 50,000 unit capsule       I have discussed the diagnosis with the patient and the intended plan as seen in the above orders. The patient has received an after-visit summary and questions were answered concerning future plans. Medication Side Effects and Warnings were discussed with patient: yes  Patient Labs were reviewed and or requested: yes  Patient Past Records were reviewed and or requested: yes        There are no Patient Instructions on file for this visit.     The patient verbalizes understanding and agrees with the plan of care        Patient has the advanced directives booklet to review

## 2019-05-29 LAB
25(OH)D3+25(OH)D2 SERPL-MCNC: 33.9 NG/ML (ref 30–100)
ALBUMIN SERPL-MCNC: 4.4 G/DL (ref 3.5–5.5)
ALBUMIN/GLOB SERPL: 1.8 {RATIO} (ref 1.2–2.2)
ALP SERPL-CCNC: 98 IU/L (ref 39–117)
ALT SERPL-CCNC: 17 IU/L (ref 0–32)
AST SERPL-CCNC: 20 IU/L (ref 0–40)
BILIRUB SERPL-MCNC: 0.3 MG/DL (ref 0–1.2)
BUN SERPL-MCNC: 14 MG/DL (ref 6–24)
BUN/CREAT SERPL: 19 (ref 9–23)
CALCIUM SERPL-MCNC: 9.5 MG/DL (ref 8.7–10.2)
CHLORIDE SERPL-SCNC: 104 MMOL/L (ref 96–106)
CHOLEST SERPL-MCNC: 187 MG/DL (ref 100–199)
CO2 SERPL-SCNC: 25 MMOL/L (ref 20–29)
CREAT SERPL-MCNC: 0.75 MG/DL (ref 0.57–1)
EST. AVERAGE GLUCOSE BLD GHB EST-MCNC: 120 MG/DL
GLOBULIN SER CALC-MCNC: 2.5 G/DL (ref 1.5–4.5)
GLUCOSE SERPL-MCNC: 91 MG/DL (ref 65–99)
HBA1C MFR BLD: 5.8 % (ref 4.8–5.6)
HDLC SERPL-MCNC: 73 MG/DL
INTERPRETATION, 910389: NORMAL
LDLC SERPL CALC-MCNC: 87 MG/DL (ref 0–99)
Lab: NORMAL
POTASSIUM SERPL-SCNC: 4.2 MMOL/L (ref 3.5–5.2)
PROT SERPL-MCNC: 6.9 G/DL (ref 6–8.5)
SODIUM SERPL-SCNC: 142 MMOL/L (ref 134–144)
TRIGL SERPL-MCNC: 136 MG/DL (ref 0–149)
VIT B12 SERPL-MCNC: 859 PG/ML (ref 232–1245)
VLDLC SERPL CALC-MCNC: 27 MG/DL (ref 5–40)

## 2019-05-30 RX ORDER — ERGOCALCIFEROL 1.25 MG/1
50000 CAPSULE ORAL
Qty: 8 CAP | Refills: 0 | Status: SHIPPED | OUTPATIENT
Start: 2019-05-30 | End: 2020-09-18 | Stop reason: SDUPTHER

## 2019-05-30 NOTE — PROGRESS NOTES
The blood sugar control is better but still in the prediabetes category  The cholesterol is normal  The liver is normal now! Great job!   The vit D is normal, but barely normal. I want you to take a supplement for 2 months  The vit B12 is great

## 2019-05-31 NOTE — PROGRESS NOTES
Spoke with pt advised of lab results/recommendations. Pt verbalized understanding and no further questions.

## 2019-08-12 DIAGNOSIS — J20.9 ACUTE BRONCHITIS, UNSPECIFIED ORGANISM: ICD-10-CM

## 2019-08-12 RX ORDER — ALBUTEROL SULFATE 90 UG/1
2 AEROSOL, METERED RESPIRATORY (INHALATION)
Qty: 1 INHALER | Refills: 1 | Status: ON HOLD | OUTPATIENT
Start: 2019-08-12 | End: 2022-03-20

## 2019-08-12 RX ORDER — GUAIFENESIN 400 MG/1
400 TABLET ORAL 3 TIMES DAILY
Qty: 90 TAB | Refills: 1 | Status: ON HOLD | OUTPATIENT
Start: 2019-08-12 | End: 2022-03-20

## 2019-08-12 NOTE — PROGRESS NOTES
Patient calling with cough for 5 days not getting better. Feeling a little tigt in the chest. Baby sat for 2 sick babies and immediately got sick   Asking for something for the cough  I am out of the office this week  I will chani in mucinex and albuterol.  Come in next week if not resolved

## 2019-12-08 DIAGNOSIS — M25.551 PAIN OF RIGHT HIP JOINT: Primary | ICD-10-CM

## 2019-12-20 ENCOUNTER — HOSPITAL ENCOUNTER (OUTPATIENT)
Dept: GENERAL RADIOLOGY | Age: 54
Discharge: HOME | End: 2019-12-20
Attending: FAMILY MEDICINE
Payer: COMMERCIAL

## 2019-12-20 DIAGNOSIS — M25.551 PAIN OF RIGHT HIP JOINT: ICD-10-CM

## 2019-12-20 PROCEDURE — 73502 X-RAY EXAM HIP UNI 2-3 VIEWS: CPT

## 2019-12-31 DIAGNOSIS — M25.551 PAIN OF RIGHT HIP JOINT: ICD-10-CM

## 2019-12-31 DIAGNOSIS — G89.29 CHRONIC PAIN OF LEFT KNEE: ICD-10-CM

## 2019-12-31 DIAGNOSIS — M25.562 CHRONIC PAIN OF LEFT KNEE: ICD-10-CM

## 2019-12-31 RX ORDER — LIDOCAINE 50 MG/G
PATCH TOPICAL
Qty: 30 EACH | Refills: 11 | Status: ON HOLD | OUTPATIENT
Start: 2019-12-31 | End: 2022-03-20

## 2020-01-19 DIAGNOSIS — R05.8 PRODUCTIVE COUGH: Primary | ICD-10-CM

## 2020-01-19 RX ORDER — AZITHROMYCIN 250 MG/1
TABLET, FILM COATED ORAL
Qty: 6 TAB | Refills: 0 | Status: SHIPPED | OUTPATIENT
Start: 2020-01-19 | End: 2020-09-18

## 2020-01-19 NOTE — PROGRESS NOTES
The patient called. She has temp 100 and productive cough with colored phlegm  I am sending rx for azithromycin.  She will folllow up next week in the office

## 2020-03-26 ENCOUNTER — TELEPHONE (OUTPATIENT)
Dept: FAMILY MEDICINE CLINIC | Age: 55
End: 2020-03-26

## 2020-03-26 NOTE — TELEPHONE ENCOUNTER
Two patient Identification confirmed. I spoke with the patient about Mammo is normal.    Patient verbalized understanding.

## 2020-05-29 ENCOUNTER — VIRTUAL VISIT (OUTPATIENT)
Dept: FAMILY MEDICINE CLINIC | Age: 55
End: 2020-05-29

## 2020-05-29 DIAGNOSIS — M25.551 PAIN OF RIGHT HIP JOINT: Primary | ICD-10-CM

## 2020-05-29 DIAGNOSIS — M54.50 LOW BACK PAIN AT MULTIPLE SITES: ICD-10-CM

## 2020-05-29 DIAGNOSIS — M25.562 CHRONIC PAIN OF LEFT KNEE: ICD-10-CM

## 2020-05-29 DIAGNOSIS — G89.29 CHRONIC PAIN OF LEFT KNEE: ICD-10-CM

## 2020-05-29 RX ORDER — DICLOFENAC SODIUM 10 MG/G
4 GEL TOPICAL 4 TIMES DAILY
Qty: 100 G | Refills: 5 | Status: SHIPPED | OUTPATIENT
Start: 2020-05-29 | End: 2021-02-11 | Stop reason: SDUPTHER

## 2020-05-29 NOTE — PROGRESS NOTES
Barbara Montgomery is a 47 y.o. female who was seen by synchronous (real-time) audio-video technology on 5/29/2020. Consent: Barbara Montgomery, who was seen by synchronous (real-time) audio-video technology, and/or her healthcare decision maker, is aware that this patient-initiated, Telehealth encounter on 5/29/2020 is a billable service, with coverage as determined by her insurance carrier. She is aware that she may receive a bill and has provided verbal consent to proceed: Yes. C/o left knee , left hip and lower back  This started 25 years ago while in [facility]  She has tried PT multiple times  The pain varies from mild to mod and sometimes severe  Right now is mild-mod  She cannot take nsaids due to gastric sleeve   she is using voltaren gel and that works ok but not all pain controlled      Assessment & Plan:   Diagnoses and all orders for this visit:    1. Pain of right hip joint  -     diclofenac (VOLTAREN) 1 % gel; Apply 4 g to affected area four (4) times daily. 2. Chronic pain of left knee  -     diclofenac (VOLTAREN) 1 % gel; Apply 4 g to affected area four (4) times daily. 3. Low back pain at multiple sites  -     diclofenac (VOLTAREN) 1 % gel; Apply 4 g to affected area four (4) times daily. cont the exercises she has learned in PT          Subjective:   Barbara Montgomery is a 47 y.o. female who was seen for No chief complaint on file. Prior to Admission medications    Medication Sig Start Date End Date Taking? Authorizing Provider   diclofenac (VOLTAREN) 1 % gel Apply 4 g to affected area four (4) times daily. 5/29/20  Yes Dawson Dumont MD   azithromycin [facility]) 250 mg tablet Take two tablets today then one tablet daily 1/19/20   Dawson Dumont MD   lidocaine (LIDODERM) 5 % Apply patch to the affected area for 12 hours a day and remove for 12 hours a day.  12/31/19   Dawson Dumont MD   albuterol (PROVENTIL HFA, VENTOLIN HFA, PROAIR HFA) 90 mcg/actuation inhaler Take 2 Puffs by inhalation every four (4) hours as needed for Wheezing. 8/12/19   Carito Manzanares MD   guaiFENesin (ORGANIDIN) 400 mg tablet Take 1 Tab by mouth three (3) times daily. 8/12/19   Carito Manzanares MD   ergocalciferol (ERGOCALCIFEROL) 50,000 unit capsule Take 1 Cap by mouth every seven (7) days. 5/30/19   Carito Manzanares MD   multivitamin (ONE A DAY) tablet Take 1 Tab by mouth daily. Provider, Historical   HYDROcodone-acetaminophen (NORCO) 5-325 mg per tablet Take 1 Tab by mouth every four (4) hours as needed. Max Daily Amount: 6 Tabs. 1/31/18   Neda Hartley MD   CALCIUM CARB/MAGNESIUM CARB (CALCIUM & MAGNESIUM CARBONATES PO) Take 1 Tab by mouth daily. Provider, Historical     Allergies   Allergen Reactions    Apple Itching           ROS    Objective:   Vital Signs: (As obtained by patient/caregiver at home)  There were no vitals taken for this visit.      [INSTRUCTIONS:  \"[x]\" Indicates a positive item  \"[]\" Indicates a negative item  -- DELETE ALL ITEMS NOT EXAMINED]    Constitutional: [x] Appears well-developed and well-nourished [x] No apparent distress      [] Abnormal -     Mental status: [x] Alert and awake  [x] Oriented to person/place/time [x] Able to follow commands    [] Abnormal -     Eyes:   EOM    [x]  Normal    [] Abnormal -   Sclera  [x]  Normal    [] Abnormal -          Discharge [x]  None visible   [] Abnormal -     HENT: [x] Normocephalic, atraumatic  [] Abnormal -   [x] Mouth/Throat: Mucous membranes are moist    External Ears [x] Normal  [] Abnormal -    Neck: [x] No visualized mass [] Abnormal -     Pulmonary/Chest: [x] Respiratory effort normal   [x] No visualized signs of difficulty breathing or respiratory distress        [] Abnormal -      Musculoskeletal:   [x] Normal gait with no signs of ataxia         [x] Normal range of motion of neck        [] Abnormal -     Neurological:        [x] No Facial Asymmetry (Cranial nerve 7 motor function) (limited exam due to video visit)          [x] No gaze palsy        [] Abnormal -          Skin:        [x] No significant exanthematous lesions or discoloration noted on facial skin         [] Abnormal -            Psychiatric:       [x] Normal Affect [] Abnormal -        [x] No Hallucinations    Other pertinent observable physical exam findings:-        We discussed the expected course, resolution and complications of the diagnosis(es) in detail. Medication risks, benefits, costs, interactions, and alternatives were discussed as indicated. I advised her to contact the office if her condition worsens, changes or fails to improve as anticipated. She expressed understanding with the diagnosis(es) and plan. Salinas Starr is a 47 y.o. female who was evaluated by a video visit encounter for concerns as above. Patient identification was verified prior to start of the visit. A caregiver was present when appropriate. Due to this being a TeleHealth encounter (During [facility] public health emergency), evaluation of the following organ systems was limited: Vitals/Constitutional/EENT/Resp/CV/GI//MS/Neuro/Skin/Heme-Lymph-Imm. Pursuant to the emergency declaration under the [facility] waiver authority and the [email or web address] and [employer] Act, this Virtual  Visit was conducted, with patient's (and/or legal guardian's) consent, to reduce the patient's risk of exposure to COVID-19 and provide necessary medical care. Services were provided through a video synchronous discussion virtually to substitute for in-person clinic visit. Patient and provider were located at their individual homes.       Jordi Meehan MD

## 2020-06-24 ENCOUNTER — VIRTUAL VISIT (OUTPATIENT)
Dept: FAMILY MEDICINE CLINIC | Age: 55
End: 2020-06-24

## 2020-06-24 DIAGNOSIS — Z98.84 S/P BARIATRIC SURGERY: ICD-10-CM

## 2020-06-24 DIAGNOSIS — B35.1 ONYCHOMYCOSIS: Primary | ICD-10-CM

## 2020-06-24 DIAGNOSIS — E11.9 CONTROLLED TYPE 2 DIABETES MELLITUS WITHOUT COMPLICATION, WITHOUT LONG-TERM CURRENT USE OF INSULIN (HCC): ICD-10-CM

## 2020-06-24 RX ORDER — TERBINAFINE HYDROCHLORIDE 250 MG/1
250 TABLET ORAL DAILY
Qty: 30 TAB | Refills: 0 | Status: ON HOLD | OUTPATIENT
Start: 2020-06-24 | End: 2022-03-20

## 2020-06-24 NOTE — PROGRESS NOTES
Senait Canales is a 47 y.o. female      Chief Complaint   Patient presents with    Nail Problem     Lt ( thumb) x 6 months getting worst          1. Have you been to the ER, urgent care clinic since your last visit? Hospitalized since your last visit? no      2. Have you seen or consulted any other health care providers outside of the [address] [place] [address] since your last visit? Include any pap smears or colon screening. no    Senait Canales is a 47 y.o. female who was seen by synchronous (real-time) audio-video technology on 6/24/2020. Consent: Senait Canales, who was seen by synchronous (real-time) audio-video technology, and/or her healthcare decision maker, is aware that this patient-initiated, Telehealth encounter on 6/24/2020 is a billable service, with coverage as determined by her insurance carrier. She is aware that she may receive a bill and has provided verbal consent to proceed: Yes. Thumb nail is hypertrophic, uses kerasol which made the pain she had in the past go away   it started in January and getting worse since march        Assessment & Plan:   Diagnoses and all orders for this visit:    1. Onychomycosis  -     terbinafine HCL (LAMISIL) 250 mg tablet; Take 1 Tab by mouth daily.  -     METABOLIC PANEL, COMPREHENSIVE; Future  Check cmp to check liver in 3 weeks  2. Controlled type 2 diabetes mellitus without complication, without long-term current use of insulin (HCC)  -     HEMOGLOBIN A1C WITH EAG; Future  Check other labs along w cmp  3. S/P bariatric surgery  -     VITAMIN D, 25 HYDROXY; Future  -     VITAMIN B12; Future      Check cmp in 3 weeks      Subjective:   Senait Canales is a 47 y.o. female who was seen for Nail Problem (Lt ( thumb) x 6 months getting worst )      Prior to Admission medications    Medication Sig Start Date End Date Taking? Authorizing Provider   terbinafine HCL (LAMISIL) 250 mg tablet Take 1 Tab by mouth daily. 6/24/20  Yes Antonia Low MD   albuterol (PROVENTIL HFA, VENTOLIN HFA, PROAIR HFA) 90 mcg/actuation inhaler Take 2 Puffs by inhalation every four (4) hours as needed for Wheezing. 8/12/19  Yes Antonia Low MD   multivitamin (ONE A DAY) tablet Take 1 Tab by mouth daily. Yes Provider, Historical   CALCIUM CARB/MAGNESIUM CARB (CALCIUM & MAGNESIUM CARBONATES PO) Take 1 Tab by mouth daily. Yes Provider, Historical   diclofenac (VOLTAREN) 1 % gel Apply 4 g to affected area four (4) times daily. 5/29/20   Antonia Low MD   [facility]) 250 mg tablet Take two tablets today then one tablet daily 1/19/20   Antonia Low MD   lidocaine (LIDODERM) 5 % Apply patch to the affected area for 12 hours a day and remove for 12 hours a day. 12/31/19   Antonia Low MD   guaiFENesin (ORGANIDIN) 400 mg tablet Take 1 Tab by mouth three (3) times daily. 8/12/19   Antonia Low MD   ergocalciferol (ERGOCALCIFEROL) 50,000 unit capsule Take 1 Cap by mouth every seven (7) days. 5/30/19   Antonia Low MD   HYDROcodone-acetaminophen [facility]) 5-325 mg per tablet Take 1 Tab by mouth every four (4) hours as needed. Max Daily Amount: 6 Tabs.  1/31/18   Cristi Moctezuma MD     Allergies   Allergen Reactions    Apple Itching       Patient Active Problem List    Diagnosis Date Noted    Biliary calculus with obstruction without cholecystitis 01/28/2018    Controlled type 2 diabetes mellitus without complication, without long-term current use of insulin ([address] 11/16/2016    BREE (obstructive sleep apnea) 10/19/2016    Diabetes mellitus type 2, controlled ([address] 09/09/2016    Obesity (BMI 30-39.9) 08/21/2016    Prediabetes 08/21/2016    New daily persistent headache 08/21/2016    GERD (gastroesophageal reflux disease) 09/28/2009    Chronic back pain 09/28/2009    Overweight(278.02) 09/28/2009    Fatigue 09/28/2009     Current Outpatient Medications   Medication Sig Dispense Refill    terbinafine HCL (LAMISIL) 250 mg tablet Take 1 Tab by mouth daily. 30 Tab 0    albuterol (PROVENTIL HFA, VENTOLIN HFA, PROAIR HFA) 90 mcg/actuation inhaler Take 2 Puffs by inhalation every four (4) hours as needed for Wheezing. 1 Inhaler 1    multivitamin (ONE A DAY) tablet Take 1 Tab by mouth daily.  CALCIUM CARB/MAGNESIUM CARB (CALCIUM & MAGNESIUM CARBONATES PO) Take 1 Tab by mouth daily.  diclofenac (VOLTAREN) 1 % gel Apply 4 g to affected area four (4) times daily. 100 g 5    azithromycin (ZITHROMAX) 250 mg tablet Take two tablets today then one tablet daily 6 Tab 0    lidocaine (LIDODERM) 5 % Apply patch to the affected area for 12 hours a day and remove for 12 hours a day. 30 Each 11    guaiFENesin (ORGANIDIN) 400 mg tablet Take 1 Tab by mouth three (3) times daily. 90 Tab 1    ergocalciferol (ERGOCALCIFEROL) 50,000 unit capsule Take 1 Cap by mouth every seven (7) days. 8 Cap 0    HYDROcodone-acetaminophen (NORCO) 5-325 mg per tablet Take 1 Tab by mouth every four (4) hours as needed. Max Daily Amount: 6 Tabs. 30 Tab 0       ROS    Objective:   Vital Signs: (As obtained by patient/caregiver at home)  There were no vitals taken for this visit.      [INSTRUCTIONS:  \"[x]\" Indicates a positive item  \"[]\" Indicates a negative item  -- DELETE ALL ITEMS NOT EXAMINED]    Constitutional: [x] Appears well-developed and well-nourished [x] No apparent distress      [] Abnormal -     Mental status: [x] Alert and awake  [x] Oriented to person/place/time [x] Able to follow commands    [] Abnormal -     Eyes:   EOM    [x]  Normal    [] Abnormal -   Sclera  [x]  Normal    [] Abnormal -          Discharge [x]  None visible   [] Abnormal -     HENT: [x] Normocephalic, atraumatic  [] Abnormal -   [x] Mouth/Throat: Mucous membranes are moist    External Ears [x] Normal  [] Abnormal -    Neck: [x] No visualized mass [] Abnormal -     Pulmonary/Chest: [x] Respiratory effort normal   [x] No visualized signs of difficulty breathing or respiratory distress        [] Abnormal -      Musculoskeletal:   [x] Normal gait with no signs of ataxia         [x] Normal range of motion of neck        [] Abnormal -     Neurological:        [x] No Facial Asymmetry (Cranial nerve 7 motor function) (limited exam due to video visit)          [x] No gaze palsy        [] Abnormal -          Skin:        [x] No significant exanthematous lesions or discoloration noted on facial skin         [] Abnormal -            Psychiatric:       [x] Normal Affect [] Abnormal -        [x] No Hallucinations    Other pertinent observable physical exam findings:-        We discussed the expected course, resolution and complications of the diagnosis(es) in detail. Medication risks, benefits, costs, interactions, and alternatives were discussed as indicated. I advised her to contact the office if her condition worsens, changes or fails to improve as anticipated. She expressed understanding with the diagnosis(es) and plan. Patrice Muñoz is a 47 y.o. female who was evaluated by a video visit encounter for concerns as above. Patient identification was verified prior to start of the visit. A caregiver was present when appropriate. Due to this being a TeleHealth encounter (During FAWQE-00 public health emergency), evaluation of the following organ systems was limited: Vitals/Constitutional/EENT/Resp/CV/GI//MS/Neuro/Skin/Heme-Lymph-Imm. Pursuant to the emergency declaration under the [facility] waiver authority and the [employer] Act, this Virtual  Visit was conducted, with patient's (and/or legal guardian's) consent, to reduce the patient's risk of exposure to COVID-19 and provide necessary medical care. Services were provided through a video synchronous discussion virtually to substitute for in-person clinic visit.    Patient and provider were located at their individual homes.       Enma Can MD

## 2020-08-25 ENCOUNTER — VIRTUAL VISIT (OUTPATIENT)
Dept: FAMILY MEDICINE CLINIC | Age: 55
End: 2020-08-25
Payer: COMMERCIAL

## 2020-08-25 DIAGNOSIS — N39.0 URINARY TRACT INFECTION WITHOUT HEMATURIA, SITE UNSPECIFIED: Primary | ICD-10-CM

## 2020-08-25 PROCEDURE — 99213 OFFICE O/P EST LOW 20 MIN: CPT | Performed by: FAMILY MEDICINE

## 2020-08-25 RX ORDER — ESTRADIOL 1 MG/G
GEL TOPICAL
Status: ON HOLD | COMMUNITY
Start: 2020-07-15 | End: 2022-03-20

## 2020-08-25 RX ORDER — SULFAMETHOXAZOLE AND TRIMETHOPRIM 800; 160 MG/1; MG/1
1 TABLET ORAL 2 TIMES DAILY
Qty: 20 TAB | Refills: 0 | Status: SHIPPED | OUTPATIENT
Start: 2020-08-25 | End: 2020-09-04

## 2020-08-25 NOTE — PROGRESS NOTES
Identified pt with two pt identifiers(name and ). Reviewed record in preparation for visit and have obtained necessary documentation. Chief Complaint   Patient presents with    Urinary Frequency     Pt states that for x2-3 days; uti frequency, buring senation         Health Maintenance Due   Topic    Foot Exam Q1     Shingrix Vaccine Age 50> (1 of 2)    FOBT Q1Y Age 54-65     PAP AKA CERVICAL CYTOLOGY     MICROALBUMIN Q1     Eye Exam Retinal or Dilated     A1C test (Diabetic or Prediabetic)     Lipid Screen        Coordination of Care Questionnaire:  :   1) Have you been to an emergency room, urgent care, or hospitalized since your last visit? If yes, where when, and reason for visit? no      2. Have seen or consulted any other health care provider since your last visit? If yes, where when, and reason for visit?  no        Patient is accompanied by self I have received verbal consent from Raad Rodríguez to discuss any/all medical information while they are present in the room.

## 2020-08-25 NOTE — PROGRESS NOTES
Maura Lennon is a 47 y.o. female who was seen by synchronous (real-time) audio-video technology on 8/25/2020 for Urinary Frequency (Pt states that for x2-3 days; uti frequency, buring senation )    Started 3 days ago, painful urination, chills,   She has used cranberry and that helpd a little then the symptoms comes right back  Has not seen any blood in the urine    Assessment & Plan:   Diagnoses and all orders for this visit:    1. Urinary tract infection without hematuria, site unspecified  -     trimethoprim-sulfamethoxazole (BACTRIM DS, SEPTRA DS) 160-800 mg per tablet; Take 1 Tab by mouth two (2) times a day for 10 days. Call back if not better in 2-3 days, finish all of the med      Subjective:       Prior to Admission medications    Medication Sig Start Date End Date Taking? Authorizing Provider   DivigeL 1 mg/gram (0.1 %) glpk  7/15/20  Yes Provider, Historical   trimethoprim-sulfamethoxazole (BACTRIM DS, SEPTRA DS) 160-800 mg per tablet Take 1 Tab by mouth two (2) times a day for 10 days. 8/25/20 9/4/20 Yes Miranda Morales MD   diclofenac (VOLTAREN) 1 % gel Apply 4 g to affected area four (4) times daily. 5/29/20  Yes Miranda Morales MD   albuterol (PROVENTIL HFA, VENTOLIN HFA, PROAIR HFA) 90 mcg/actuation inhaler Take 2 Puffs by inhalation every four (4) hours as needed for Wheezing. 8/12/19  Yes Miranda Morales MD   multivitamin (ONE A DAY) tablet Take 1 Tab by mouth daily. Yes Provider, Historical   terbinafine HCL (LAMISIL) 250 mg tablet Take 1 Tab by mouth daily. 6/24/20   Miranda Morales MD   [facility]) 250 mg tablet Take two tablets today then one tablet daily 1/19/20   Miranda Morales MD   lidocaine (LIDODERM) 5 % Apply patch to the affected area for 12 hours a day and remove for 12 hours a day. 12/31/19   Miranda Morales MD   guaiFENesin (ORGANIDIN) 400 mg tablet Take 1 Tab by mouth three (3) times daily.  8/12/19   Miranda Morales MD   ergocalciferol (ERGOCALCIFEROL) 50,000 unit capsule Take 1 Cap by mouth every seven (7) days. 5/30/19   Britney Herrera MD   HYDROcodone-acetaminophen [facility]) 5-325 mg per tablet Take 1 Tab by mouth every four (4) hours as needed. Max Daily Amount: 6 Tabs. 1/31/18   Paul Pineda MD   CALCIUM CARB/MAGNESIUM CARB (CALCIUM & MAGNESIUM CARBONATES PO) Take 1 Tab by mouth daily. Provider, Historical     Patient Active Problem List    Diagnosis Date Noted    Biliary calculus with obstruction without cholecystitis 01/28/2018    Controlled type 2 diabetes mellitus without complication, without long-term current use of insulin ([facility] [address] 11/16/2016    BREE (obstructive sleep apnea) 10/19/2016    Diabetes mellitus type 2, controlled ([address] 09/09/2016    Obesity (BMI 30-39.9) 08/21/2016    Prediabetes 08/21/2016    New daily persistent headache 08/21/2016    GERD (gastroesophageal reflux disease) 09/28/2009    Chronic back pain 09/28/2009    Overweight(278.02) 09/28/2009    Fatigue 09/28/2009     Current Outpatient Medications   Medication Sig Dispense Refill    DivigeL 1 mg/gram (0.1 %) glpk       trimethoprim-sulfamethoxazole (BACTRIM DS, SEPTRA DS) 160-800 mg per tablet Take 1 Tab by mouth two (2) times a day for 10 days. 20 Tab 0    diclofenac (VOLTAREN) 1 % gel Apply 4 g to affected area four (4) times daily. 100 g 5    albuterol (PROVENTIL HFA, VENTOLIN HFA, PROAIR HFA) 90 mcg/actuation inhaler Take 2 Puffs by inhalation every four (4) hours as needed for Wheezing. 1 Inhaler 1    multivitamin (ONE A DAY) tablet Take 1 Tab by mouth daily.  terbinafine HCL (LAMISIL) 250 mg tablet Take 1 Tab by mouth daily. 30 Tab 0    azithromycin (ZITHROMAX) 250 mg tablet Take two tablets today then one tablet daily 6 Tab 0    lidocaine (LIDODERM) 5 % Apply patch to the affected area for 12 hours a day and remove for 12 hours a day. 30 Each 11    guaiFENesin (ORGANIDIN) 400 mg tablet Take 1 Tab by mouth three (3) times daily.  90 Tab 1    ergocalciferol (ERGOCALCIFEROL) 50,000 unit capsule Take 1 Cap by mouth every seven (7) days. 8 Cap 0    HYDROcodone-acetaminophen (NORCO) 5-325 mg per tablet Take 1 Tab by mouth every four (4) hours as needed. Max Daily Amount: 6 Tabs. 30 Tab 0    CALCIUM CARB/MAGNESIUM CARB (CALCIUM & MAGNESIUM CARBONATES PO) Take 1 Tab by mouth daily.          ROS    Objective:     Patient-Reported Vitals 8/25/2020   Patient-Reported Weight 199lb   Patient-Reported Height -        [INSTRUCTIONS:  \"[x]\" Indicates a positive item  \"[]\" Indicates a negative item  -- DELETE ALL ITEMS NOT EXAMINED]    Constitutional: [x] Appears well-developed and well-nourished [x] No apparent distress      [] Abnormal -     Mental status: [x] Alert and awake  [x] Oriented to person/place/time [x] Able to follow commands    [] Abnormal -     Eyes:   EOM    [x]  Normal    [] Abnormal -   Sclera  [x]  Normal    [] Abnormal -          Discharge [x]  None visible   [] Abnormal -     HENT: [x] Normocephalic, atraumatic  [] Abnormal -   [x] Mouth/Throat: Mucous membranes are moist    External Ears [x] Normal  [] Abnormal -    Neck: [x] No visualized mass [] Abnormal -     Pulmonary/Chest: [x] Respiratory effort normal   [x] No visualized signs of difficulty breathing or respiratory distress        [] Abnormal -      Musculoskeletal:   [x] Normal gait with no signs of ataxia         [x] Normal range of motion of neck        [] Abnormal -     Neurological:        [x] No Facial Asymmetry (Cranial nerve 7 motor function) (limited exam due to video visit)          [x] No gaze palsy        [] Abnormal -          Skin:        [x] No significant exanthematous lesions or discoloration noted on facial skin         [] Abnormal -            Psychiatric:       [x] Normal Affect [] Abnormal -        [x] No Hallucinations    Other pertinent observable physical exam findings:-        We discussed the expected course, resolution and complications of the diagnosis(es) in detail. Medication risks, benefits, costs, interactions, and alternatives were discussed as indicated. I advised her to contact the office if her condition worsens, changes or fails to improve as anticipated. She expressed understanding with the diagnosis(es) and plan. Yudith Herrera, who was evaluated through a patient-initiated, synchronous (real-time) audio-video encounter, and/or her healthcare decision maker, is aware that it is a billable service, with coverage as determined by her insurance carrier. She provided verbal consent to proceed: Yes, and patient identification was verified. It was conducted pursuant to the emergency declaration under the [address] [place] [address], [address] [place] [address] authority and the [employer] Act. A caregiver was present when appropriate. Ability to conduct physical exam was limited. I was at home. The patient was at home.       Adriana Ewing MD

## 2020-09-18 ENCOUNTER — DOCUMENTATION ONLY (OUTPATIENT)
Dept: FAMILY MEDICINE CLINIC | Age: 55
End: 2020-09-18

## 2020-09-18 DIAGNOSIS — Z98.84 S/P BARIATRIC SURGERY: ICD-10-CM

## 2020-09-18 RX ORDER — ERGOCALCIFEROL 1.25 MG/1
50000 CAPSULE ORAL
Qty: 12 CAP | Refills: 0 | Status: SHIPPED | OUTPATIENT
Start: 2020-09-18 | End: 2020-12-19

## 2020-12-18 DIAGNOSIS — Z98.84 S/P BARIATRIC SURGERY: ICD-10-CM

## 2020-12-19 RX ORDER — ERGOCALCIFEROL 1.25 MG/1
CAPSULE ORAL
Qty: 4 CAP | Refills: 2 | Status: ON HOLD | OUTPATIENT
Start: 2020-12-19 | End: 2022-03-20

## 2021-02-11 ENCOUNTER — TELEPHONE (OUTPATIENT)
Dept: FAMILY MEDICINE CLINIC | Age: 56
End: 2021-02-11

## 2021-02-11 DIAGNOSIS — M54.50 LOW BACK PAIN AT MULTIPLE SITES: ICD-10-CM

## 2021-02-11 DIAGNOSIS — G89.29 CHRONIC PAIN OF LEFT KNEE: ICD-10-CM

## 2021-02-11 DIAGNOSIS — M25.562 CHRONIC PAIN OF LEFT KNEE: ICD-10-CM

## 2021-02-11 DIAGNOSIS — M25.551 PAIN OF RIGHT HIP JOINT: ICD-10-CM

## 2021-02-11 RX ORDER — DICLOFENAC SODIUM 10 MG/G
4 GEL TOPICAL 4 TIMES DAILY
Qty: 100 G | Refills: 5 | Status: ON HOLD | OUTPATIENT
Start: 2021-02-11 | End: 2022-03-20

## 2021-02-22 DIAGNOSIS — Z13.220 SCREENING FOR HYPERLIPIDEMIA: ICD-10-CM

## 2021-02-22 DIAGNOSIS — E11.9 CONTROLLED TYPE 2 DIABETES MELLITUS WITHOUT COMPLICATION, WITHOUT LONG-TERM CURRENT USE OF INSULIN (HCC): Primary | ICD-10-CM

## 2021-03-24 ENCOUNTER — OFFICE VISIT (OUTPATIENT)
Dept: FAMILY MEDICINE CLINIC | Age: 56
End: 2021-03-24
Payer: COMMERCIAL

## 2021-03-24 VITALS
BODY MASS INDEX: 31.23 KG/M2 | RESPIRATION RATE: 16 BRPM | OXYGEN SATURATION: 97 % | HEIGHT: 67 IN | SYSTOLIC BLOOD PRESSURE: 100 MMHG | TEMPERATURE: 97.6 F | WEIGHT: 199 LBS | HEART RATE: 69 BPM | DIASTOLIC BLOOD PRESSURE: 61 MMHG

## 2021-03-24 DIAGNOSIS — G89.29 CHRONIC PAIN OF LEFT KNEE: ICD-10-CM

## 2021-03-24 DIAGNOSIS — Z12.11 COLON CANCER SCREENING: ICD-10-CM

## 2021-03-24 DIAGNOSIS — Z13.220 SCREENING FOR HYPERLIPIDEMIA: ICD-10-CM

## 2021-03-24 DIAGNOSIS — G47.33 OSA (OBSTRUCTIVE SLEEP APNEA): ICD-10-CM

## 2021-03-24 DIAGNOSIS — Z00.00 PHYSICAL EXAM: Primary | ICD-10-CM

## 2021-03-24 DIAGNOSIS — M25.562 CHRONIC PAIN OF LEFT KNEE: ICD-10-CM

## 2021-03-24 DIAGNOSIS — R73.9 BLOOD GLUCOSE ELEVATED: ICD-10-CM

## 2021-03-24 DIAGNOSIS — E55.9 VITAMIN D DEFICIENCY: ICD-10-CM

## 2021-03-24 DIAGNOSIS — M25.551 PAIN OF RIGHT HIP JOINT: ICD-10-CM

## 2021-03-24 DIAGNOSIS — M54.50 LOW BACK PAIN AT MULTIPLE SITES: ICD-10-CM

## 2021-03-24 PROCEDURE — 99396 PREV VISIT EST AGE 40-64: CPT | Performed by: FAMILY MEDICINE

## 2021-03-24 RX ORDER — BUPROPION HYDROCHLORIDE 100 MG/1
100 TABLET, EXTENDED RELEASE ORAL 2 TIMES DAILY
Qty: 60 TAB | Refills: 2 | Status: SHIPPED | OUTPATIENT
Start: 2021-03-24 | End: 2021-04-23 | Stop reason: SDUPTHER

## 2021-03-24 NOTE — PROGRESS NOTES
1. Have you been to the ER, urgent care clinic since your last visit? Hospitalized since your last visit? No    2. Have you seen or consulted any other health care providers outside of the [address] [place] [address] since your last visit? Include any pap smears or colon screening. No     Chief Complaint   Patient presents with    Complete Physical     Patient states not fasting.  Sleep Problem     Patient states not sleeping well since father passed away in 2/2021     Health Maintenance Due   Topic Date Due    Foot Exam Q1  Never done    Shingrix Vaccine Age 50> (1 of 2) Never done    Colorectal Cancer Screening Combo  Never done    PAP AKA CERVICAL CYTOLOGY  08/12/2017    MICROALBUMIN Q1  03/17/2018    Eye Exam Retinal or Dilated  04/17/2020    A1C test (Diabetic or Prediabetic)  05/28/2020    Lipid Screen  05/28/2020     3 most recent PHQ Screens 3/24/2021   Little interest or pleasure in doing things Not at all   Feeling down, depressed, irritable, or hopeless Not at all   Total Score PHQ 2 0     Abuse Screening Questionnaire 3/24/2021   Do you ever feel afraid of your partner? N   Are you in a relationship with someone who physically or mentally threatens you? N   Is it safe for you to go home?  Y     Learning Assessment 3/24/2021   PRIMARY LEARNER Patient   HIGHEST LEVEL OF EDUCATION - PRIMARY LEARNER  > 4 YEARS OF COLLEGE   BARRIERS PRIMARY LEARNER NONE   CO-LEARNER CAREGIVER -   PRIMARY LANGUAGE ENGLISH    NEED -   LEARNER PREFERENCE PRIMARY LISTENING   LEARNING SPECIAL TOPICS -   ANSWERED BY patient   RELATIONSHIP SELF

## 2021-03-24 NOTE — PROGRESS NOTES
Chief Complaint   Patient presents with    Complete Physical     Patient states not fasting.  Sleep Problem     Patient states not sleeping well since father passed away in 2/2021     she is a 54y.o. year old female who presents for evalution. She is here for CPE  She had a gastric sleeve 2 years ago  She is down almost 40 lbs and has plateaued  She had BREE but stopped using it after she lost the weight        Reviewed PmHx, RxHx, FmHx, SocHx, AllgHx and updated and dated in the chart. Aspirin yes ____   No____ N/A____    Patient Active Problem List    Diagnosis    Biliary calculus with obstruction without cholecystitis    Controlled type 2 diabetes mellitus without complication, without long-term current use of insulin ([address]    BREE (obstructive sleep apnea)    Diabetes mellitus type 2, controlled ([address]    Obesity (BMI 30-39. 9)    Prediabetes    New daily persistent headache    GERD (gastroesophageal reflux disease)    Chronic back pain    Overweight(278.02)    Fatigue       Nurse notes were reviewed and copied and are correct  Review of Systems - negative except as listed above in the HPI    Objective:     Vitals:    03/24/21 1100   BP: 100/61   Pulse: 69   Resp: 16   Temp: 97.6 °F (36.4 °C)   TempSrc: Skin   SpO2: 97%   Weight: 199 lb (90.3 kg)   Height: 5' 6.5\" (1.689 m)     Physical Examination: General appearance - alert, well appearing, and in no distress  Mental status - alert, oriented to person, place, and time  Eyes - pupils equal and reactive, extraocular eye movements intact  Ears - bilateral TM's and external ear canals normal  Neck - supple, no significant adenopathy  Chest - clear to auscultation, no wheezes, rales or rhonchi, symmetric air entry  Heart - normal rate, regular rhythm, normal S1, S2, no murmurs, rubs, clicks or gallops  Abdomen - soft, nontender, nondistended, no masses or organomegaly  Neurological - alert, oriented, normal speech, no focal findings or movement disorder noted  Musculoskeletal - no joint tenderness, deformity or swelling  Extremities - peripheral pulses normal, no pedal edema, no clubbing or cyanosis  Skin - normal coloration and turgor, no rashes, no suspicious skin lesions noted         Assessment/ Plan:   Diagnoses and all orders for this visit:    1. Physical exam  -     buPROPion SR (WELLBUTRIN SR) 100 mg SR tablet; Take 1 Tab by mouth two (2) times a day. 2. Blood glucose elevated  -     HEMOGLOBIN A1C WITH EAG; Future  -     METABOLIC PANEL, COMPREHENSIVE; Future  Monitor and treat as indicated  3. BREE (obstructive sleep apnea)  I suggest using the cpap because it might help her feel less hungry  4. Chronic pain of left knee  The weight loss may help knee pain. This pain started while in the [employer] ad lkely associated with the time on her feet marching/pounding the joints  5. Pain of right hip joint   same as above    6. Low back pain at multiple sites  The pain has been there off and on for years. Has tried PT multiple times and the pain comes back  7. Vitamin D deficiency  -     VITAMIN D, 25 HYDROXY; Future    8. Screening for hyperlipidemia  -     LIPID PANEL; Future    9. Colon cancer screening  -     REFERRAL TO GASTROENTEROLOGY       Follow-up and Dispositions    · Return if symptoms worsen or fail to improve. ICD-10-CM ICD-9-CM    1. Physical exam  Z00.00 V70.9 buPROPion SR (WELLBUTRIN SR) 100 mg SR tablet   2. Blood glucose elevated  R73.9 790.29 HEMOGLOBIN A1C WITH EAG      METABOLIC PANEL, COMPREHENSIVE      METABOLIC PANEL, COMPREHENSIVE      HEMOGLOBIN A1C WITH EAG   3. BREE (obstructive sleep apnea)  G47.33 327.23    4. Chronic pain of left knee  M25.562 719.46     G89.29 338.29    5. Pain of right hip joint  M25.551 719.45    6. Low back pain at multiple sites  M54.5 724.2    7.  Vitamin D deficiency  E55.9 268.9 VITAMIN D, 25 HYDROXY      VITAMIN D, 25 HYDROXY   8. Screening for hyperlipidemia  Z13.220 V77.91 LIPID PANEL LIPID PANEL   9. Colon cancer screening  Z12.11 V76.51 REFERRAL TO GASTROENTEROLOGY       I have discussed the diagnosis with the patient and the intended plan as seen in the above orders. The patient has received an after-visit summary and questions were answered concerning future plans. There are no Patient Instructions on file for this visit.

## 2021-03-25 LAB
25(OH)D3 SERPL-MCNC: 31.1 NG/ML (ref 30–100)
ALBUMIN SERPL-MCNC: 4.1 G/DL (ref 3.5–5)
ALBUMIN/GLOB SERPL: 1.2 {RATIO} (ref 1.1–2.2)
ALP SERPL-CCNC: 87 U/L (ref 45–117)
ALT SERPL-CCNC: 21 U/L (ref 12–78)
ANION GAP SERPL CALC-SCNC: 4 MMOL/L (ref 5–15)
AST SERPL-CCNC: 17 U/L (ref 15–37)
BILIRUB SERPL-MCNC: 0.3 MG/DL (ref 0.2–1)
BUN SERPL-MCNC: 22 MG/DL (ref 6–20)
BUN/CREAT SERPL: 28 (ref 12–20)
CALCIUM SERPL-MCNC: 9.4 MG/DL (ref 8.5–10.1)
CHLORIDE SERPL-SCNC: 109 MMOL/L (ref 97–108)
CHOLEST SERPL-MCNC: 206 MG/DL
CO2 SERPL-SCNC: 29 MMOL/L (ref 21–32)
CREAT SERPL-MCNC: 0.79 MG/DL (ref 0.55–1.02)
EST. AVERAGE GLUCOSE BLD GHB EST-MCNC: 117 MG/DL
GLOBULIN SER CALC-MCNC: 3.3 G/DL (ref 2–4)
GLUCOSE SERPL-MCNC: 85 MG/DL (ref 65–100)
HBA1C MFR BLD: 5.7 % (ref 4–5.6)
HDLC SERPL-MCNC: 68 MG/DL
HDLC SERPL: 3 {RATIO} (ref 0–5)
LDLC SERPL CALC-MCNC: 109.2 MG/DL (ref 0–100)
LIPID PROFILE,FLP: ABNORMAL
POTASSIUM SERPL-SCNC: 4.5 MMOL/L (ref 3.5–5.1)
PROT SERPL-MCNC: 7.4 G/DL (ref 6.4–8.2)
SODIUM SERPL-SCNC: 142 MMOL/L (ref 136–145)
TRIGL SERPL-MCNC: 144 MG/DL (ref ?–150)
VLDLC SERPL CALC-MCNC: 28.8 MG/DL

## 2021-04-13 NOTE — PROGRESS NOTES
The vit D is normal but on the low end of normal  The blood sugar is in the prediabetes zone but better than it was a year ago  The cholesterol is high based on the fact that you have prediabetes   To provide you with the best heart health the LDL should be under 100 if you have no heart disease or history of diabetes. since you have a history of diabetes the LDL goal should be less than 70. Avoid fried food, fast food and junk food. Also move more each day. aim for at least 150 minutes of activity each week.  I want to recheck the cholesterol levels in three months    The liver test is now normal and and kidney tests are normal. You do need to drink more water, there is an indication you are a little dehydrated  Recheck in 3

## 2021-04-23 DIAGNOSIS — Z00.00 PHYSICAL EXAM: ICD-10-CM

## 2021-04-23 RX ORDER — BUPROPION HYDROCHLORIDE 100 MG/1
100 TABLET, EXTENDED RELEASE ORAL 2 TIMES DAILY
Qty: 60 TAB | Refills: 2 | Status: SHIPPED | OUTPATIENT
Start: 2021-04-23 | End: 2021-06-22 | Stop reason: SDUPTHER

## 2021-04-23 NOTE — TELEPHONE ENCOUNTER
PCP: Charl Spurling, MD    Last appt: 3/24/2021  No future appointments. Requested Prescriptions     Pending Prescriptions Disp Refills    buPROPion SR (WELLBUTRIN SR) 100 mg SR tablet 60 Tab 2     Sig: Take 1 Tab by mouth two (2) times a day.        Prior labs and Blood pressures:  BP Readings from Last 3 Encounters:   03/24/21 100/61   05/28/19 134/73   03/01/18 140/86     Lab Results   Component Value Date/Time    Sodium 142 03/24/2021 11:42 AM    Potassium 4.5 03/24/2021 11:42 AM    Chloride 109 (H) 03/24/2021 11:42 AM    CO2 29 03/24/2021 11:42 AM    Anion gap 4 (L) 03/24/2021 11:42 AM    Glucose 85 03/24/2021 11:42 AM    BUN 22 (H) 03/24/2021 11:42 AM    Creatinine 0.79 03/24/2021 11:42 AM    BUN/Creatinine ratio 28 (H) 03/24/2021 11:42 AM    GFR est AA >60 03/24/2021 11:42 AM    GFR est non-AA >60 03/24/2021 11:42 AM    Calcium 9.4 03/24/2021 11:42 AM     Lab Results   Component Value Date/Time    Hemoglobin A1c 5.7 (H) 03/24/2021 11:42 AM    Hemoglobin A1c (POC) 6.0 05/21/2015 10:57 AM     Lab Results   Component Value Date/Time    Cholesterol, total 206 (H) 03/24/2021 11:42 AM    HDL Cholesterol 68 03/24/2021 11:42 AM    LDL, calculated 109.2 (H) 03/24/2021 11:42 AM    VLDL, calculated 28.8 03/24/2021 11:42 AM    Triglyceride 144 03/24/2021 11:42 AM    CHOL/HDL Ratio 3.0 03/24/2021 11:42 AM     Lab Results   Component Value Date/Time    Vitamin D 25-Hydroxy 31.1 03/24/2021 11:42 AM       Lab Results   Component Value Date/Time    TSH 1.660 10/13/2017 02:39 PM

## 2021-06-22 DIAGNOSIS — Z00.00 PHYSICAL EXAM: ICD-10-CM

## 2021-06-22 RX ORDER — BUPROPION HYDROCHLORIDE 100 MG/1
100 TABLET, EXTENDED RELEASE ORAL 2 TIMES DAILY
Qty: 90 TABLET | Refills: 2 | Status: SHIPPED | OUTPATIENT
Start: 2021-06-22 | End: 2021-10-07

## 2021-06-22 NOTE — TELEPHONE ENCOUNTER
Padmini Carlton MD  Last visit 3/24/2021  Results for orders placed or performed in visit on 03/24/21   VITAMIN D, 25 HYDROXY   Result Value Ref Range    Vitamin D 25-Hydroxy 31.1 30 - 998 ng/mL   METABOLIC PANEL, COMPREHENSIVE   Result Value Ref Range    Sodium 142 136 - 145 mmol/L    Potassium 4.5 3.5 - 5.1 mmol/L    Chloride 109 (H) 97 - 108 mmol/L    CO2 29 21 - 32 mmol/L    Anion gap 4 (L) 5 - 15 mmol/L    Glucose 85 65 - 100 mg/dL    BUN 22 (H) 6 - 20 MG/DL    Creatinine 0.79 0.55 - 1.02 MG/DL    BUN/Creatinine ratio 28 (H) 12 - 20      GFR est AA >60 >60 ml/min/1.73m2    GFR est non-AA >60 >60 ml/min/1.73m2    Calcium 9.4 8.5 - 10.1 MG/DL    Bilirubin, total 0.3 0.2 - 1.0 MG/DL    ALT (SGPT) 21 12 - 78 U/L    AST (SGOT) 17 15 - 37 U/L    Alk.  phosphatase 87 45 - 117 U/L    Protein, total 7.4 6.4 - 8.2 g/dL    Albumin 4.1 3.5 - 5.0 g/dL    Globulin 3.3 2.0 - 4.0 g/dL    A-G Ratio 1.2 1.1 - 2.2     LIPID PANEL   Result Value Ref Range    LIPID PROFILE          Cholesterol, total 206 (H) <200 MG/DL    Triglyceride 144 <150 MG/DL    HDL Cholesterol 68 MG/DL    LDL, calculated 109.2 (H) 0 - 100 MG/DL    VLDL, calculated 28.8 MG/DL    CHOL/HDL Ratio 3.0 0.0 - 5.0     HEMOGLOBIN A1C WITH EAG   Result Value Ref Range    Hemoglobin A1c 5.7 (H) 4.0 - 5.6 %    Est. average glucose 117 mg/dL     Health Maintenance Due   Topic Date Due    Foot Exam Q1  Never done    Shingrix Vaccine Age 50> (1 of 2) Never done    Colorectal Cancer Screening Combo  Never done    PAP AKA CERVICAL CYTOLOGY  08/12/2017    MICROALBUMIN Q1  03/17/2018    Eye Exam Retinal or Dilated  04/17/2020    COVID-19 Vaccine (2 - Moderna 2-dose series) 04/02/2021

## 2021-10-07 ENCOUNTER — OFFICE VISIT (OUTPATIENT)
Dept: FAMILY MEDICINE CLINIC | Age: 56
End: 2021-10-07
Payer: COMMERCIAL

## 2021-10-07 VITALS
OXYGEN SATURATION: 99 % | DIASTOLIC BLOOD PRESSURE: 71 MMHG | TEMPERATURE: 97.6 F | SYSTOLIC BLOOD PRESSURE: 107 MMHG | HEIGHT: 66 IN | BODY MASS INDEX: 33.43 KG/M2 | RESPIRATION RATE: 16 BRPM | HEART RATE: 70 BPM | WEIGHT: 208 LBS

## 2021-10-07 DIAGNOSIS — M25.50 MULTIPLE JOINT PAIN: ICD-10-CM

## 2021-10-07 DIAGNOSIS — E66.9 OBESITY (BMI 30-39.9): ICD-10-CM

## 2021-10-07 DIAGNOSIS — E78.00 HYPERCHOLESTEROLEMIA: ICD-10-CM

## 2021-10-07 DIAGNOSIS — Z23 ENCOUNTER FOR IMMUNIZATION: ICD-10-CM

## 2021-10-07 DIAGNOSIS — E55.9 VITAMIN D DEFICIENCY: ICD-10-CM

## 2021-10-07 DIAGNOSIS — E11.9 CONTROLLED TYPE 2 DIABETES MELLITUS WITHOUT COMPLICATION, WITHOUT LONG-TERM CURRENT USE OF INSULIN (HCC): Primary | ICD-10-CM

## 2021-10-07 LAB
COMMENT, HOLDF: NORMAL
SAMPLES BEING HELD,HOLD: NORMAL

## 2021-10-07 PROCEDURE — 99214 OFFICE O/P EST MOD 30 MIN: CPT | Performed by: FAMILY MEDICINE

## 2021-10-07 PROCEDURE — 90686 IIV4 VACC NO PRSV 0.5 ML IM: CPT | Performed by: FAMILY MEDICINE

## 2021-10-07 PROCEDURE — 90471 IMMUNIZATION ADMIN: CPT | Performed by: FAMILY MEDICINE

## 2021-10-07 RX ORDER — SEMAGLUTIDE 0.25 MG/.5ML
1 INJECTION, SOLUTION SUBCUTANEOUS
Qty: 9 EACH | Refills: 1 | Status: ON HOLD | OUTPATIENT
Start: 2021-10-07 | End: 2022-03-20

## 2021-10-07 NOTE — PROGRESS NOTES
Chief Complaint   Patient presents with    Pre-diabetes    Leg Pain     x 1 month. Patient denies any injury.  Foot Pain    Arm Pain    Labs     she is a 54y.o. year old female who presents for evalution. She c/o red rash off and on under the pan on the abd  The area gets tender to touch  Feels irritated often  This got worse after weiht loss from sleeve gastrectomy    She has stopped losing weight and is not at goal  She admits she is eating restaurant food more  She also notes she is not exercising as much as she did in the past  She was using divagel and that seemed to stop her weight loss      Reviewed PmHx, RxHx, FmHx, SocHx, AllgHx and updated and dated in the chart. Aspirin yes ____   No____ N/A____    Patient Active Problem List    Diagnosis    Biliary calculus with obstruction without cholecystitis    Controlled type 2 diabetes mellitus without complication, without long-term current use of insulin ([address]    BREE (obstructive sleep apnea)    Diabetes mellitus type 2, controlled ([address]    Obesity (BMI 30-39. 9)    Prediabetes    New daily persistent headache    GERD (gastroesophageal reflux disease)    Chronic back pain    Overweight(278.02)    Fatigue       Nurse notes were reviewed and copied and are correct  Review of Systems - negative except as listed above in the HPI    Objective:     Vitals:    10/07/21 1455   BP: 107/71   Pulse: 70   Resp: 16   Temp: 97.6 °F (36.4 °C)   TempSrc: Skin   SpO2: 99%   Weight: 208 lb (94.3 kg)   Height: 5' 6\" (1.676 m)     Physical Examination: General appearance - alert, well appearing, and in no distress  Mental status - alert, oriented to person, place, and time  Eyes - pupils equal and reactive, extraocular eye movements intact  Ears - bilateral TM's and external ear canals normal  Neck - supple, no significant adenopathy  Chest - clear to auscultation, no wheezes, rales or rhonchi, symmetric air entry  Heart - normal rate, regular rhythm, normal S1, S2, no murmurs, rubs, clicks or gallops  Abdomen - soft, nontender, nondistended, no masses or organomegaly  Skin laying on skin under pan, a lot of mosture present no rash at this time but feels itchy and irritated per the patient  Neurological - alert, oriented, normal speech, no focal findings or movement disorder noted  Musculoskeletal - no joint tenderness, deformity or swelling  Extremities - peripheral pulses normal, no pedal edema, no clubbing or cyanosis  Skin - normal coloration and turgor, no rashes, no suspicious skin lesions noted         Assessment/ Plan:   Diagnoses and all orders for this visit:    1. Controlled type 2 diabetes mellitus without complication, without long-term current use of insulin (HCC)  -     HEMOGLOBIN A1C WITH EAG; Future  -     METABOLIC PANEL, COMPREHENSIVE; Future  Need to see if the diabetes has reolved after gastric sleeve  2. Multiple joint pain  -     RHEUMATOID FACTOR, QL  -     KELLY, DIRECT, W/REFLEX; Future  -     SED RATE (ESR); Future  Checking for inflammatory arthritis  3. Vitamin D deficiency    4. Hypercholesterolemia  -     LIPID PANEL; Future    5. Obesity (BMI 30-39.9)  -     semaglutide, weight loss, (Wegovy) 0.25 mg/0.5 mL pnij; 1 Pen by SubCUTAneous route every seven (7) days. Try to get mpre weight loss w medication  Also exercise at least 300 mins per week  cals not to exceed 1200 per day  6. Encounter for immunization  -     INFLUENZA VIRUS VAC QUAD,SPLIT,PRESV FREE SYRINGE IM    Other orders  -     [facility]           ICD-10-CM ICD-9-CM    1. Controlled type 2 diabetes mellitus without complication, without long-term current use of insulin (HCC)  E11.9 250.00 HEMOGLOBIN A1C WITH EAG      METABOLIC PANEL, COMPREHENSIVE      HEMOGLOBIN A1C WITH EAG      METABOLIC PANEL, COMPREHENSIVE   2. Multiple joint pain  M25.50 719.49 RHEUMATOID FACTOR, QL      KELLY, DIRECT, W/REFLEX      SED RATE (ESR)      SED RATE (ESR)      KELLY, DIRECT, W/REFLEX   3. Vitamin D deficiency  E55.9 268.9    4. Hypercholesterolemia  E78.00 272.0 LIPID PANEL      LIPID PANEL   5. Obesity (BMI 30-39. 9)  E66.9 278.00 semaglutide, weight loss, (Wegovy) 0.25 mg/0.5 mL pnij   6. Encounter for immunization  Z23 V03.89 INFLUENZA VIRUS VAC QUAD,SPLIT,PRESV FREE SYRINGE IM       I have discussed the diagnosis with the patient and the intended plan as seen in the above orders. The patient has received an after-visit summary and questions were answered concerning future plans. There are no Patient Instructions on file for this visit.     The patient verbalizes understanding and agrees with the plan of care

## 2021-10-07 NOTE — PROGRESS NOTES
1. Have you been to the ER, urgent care clinic since your last visit? Hospitalized since your last visit? No    2. Have you seen or consulted any other health care providers outside of the [address] [place] [address] since your last visit? Include any pap smears or colon screening. No    Chief Complaint   Patient presents with    Pre-diabetes    Leg Pain    Foot Pain    Arm Pain    Labs     3 most recent PHQ Screens 10/7/2021   Little interest or pleasure in doing things Several days   Feeling down, depressed, irritable, or hopeless Several days   Total Score PHQ 2 2     Abuse Screening Questionnaire 10/7/2021   Do you ever feel afraid of your partner? N   Are you in a relationship with someone who physically or mentally threatens you? N   Is it safe for you to go home?  Y     Learning Assessment 3/24/2021   PRIMARY LEARNER Patient   HIGHEST LEVEL OF EDUCATION - PRIMARY LEARNER  > 4 YEARS OF COLLEGE   BARRIERS PRIMARY LEARNER NONE   CO-LEARNER CAREGIVER -   PRIMARY LANGUAGE ENGLISH    NEED -   LEARNER PREFERENCE PRIMARY LISTENING   LEARNING SPECIAL TOPICS -   ANSWERED BY patient   RELATIONSHIP SELF     Visit Vitals  /71 (BP 1 Location: Left arm, BP Patient Position: Sitting, BP Cuff Size: Adult)   Pulse 70   Temp 97.6 °F (36.4 °C) (Skin)   Resp 16   Ht 5' 6\" (1.676 m)   Wt 208 lb (94.3 kg)   SpO2 99%   BMI 33.57 kg/m²

## 2021-10-08 LAB
ALBUMIN SERPL-MCNC: 4 G/DL (ref 3.5–5)
ALBUMIN/GLOB SERPL: 1.1 {RATIO} (ref 1.1–2.2)
ALP SERPL-CCNC: 84 U/L (ref 45–117)
ALT SERPL-CCNC: 21 U/L (ref 12–78)
ANION GAP SERPL CALC-SCNC: 4 MMOL/L (ref 5–15)
AST SERPL-CCNC: 16 U/L (ref 15–37)
BILIRUB SERPL-MCNC: 0.3 MG/DL (ref 0.2–1)
BUN SERPL-MCNC: 16 MG/DL (ref 6–20)
BUN/CREAT SERPL: 20 (ref 12–20)
CALCIUM SERPL-MCNC: 9.9 MG/DL (ref 8.5–10.1)
CHLORIDE SERPL-SCNC: 104 MMOL/L (ref 97–108)
CHOLEST SERPL-MCNC: 222 MG/DL
CO2 SERPL-SCNC: 30 MMOL/L (ref 21–32)
CREAT SERPL-MCNC: 0.8 MG/DL (ref 0.55–1.02)
ERYTHROCYTE [SEDIMENTATION RATE] IN BLOOD: 30 MM/HR (ref 0–30)
EST. AVERAGE GLUCOSE BLD GHB EST-MCNC: 123 MG/DL
GLOBULIN SER CALC-MCNC: 3.8 G/DL (ref 2–4)
GLUCOSE SERPL-MCNC: 91 MG/DL (ref 65–100)
HBA1C MFR BLD: 5.9 % (ref 4–5.6)
HDLC SERPL-MCNC: 75 MG/DL
HDLC SERPL: 3 {RATIO} (ref 0–5)
LDLC SERPL CALC-MCNC: 122.2 MG/DL (ref 0–100)
POTASSIUM SERPL-SCNC: 4.9 MMOL/L (ref 3.5–5.1)
PROT SERPL-MCNC: 7.8 G/DL (ref 6.4–8.2)
RHEUMATOID FACT SERPL-ACNC: <10 IU/ML (ref 0–13.9)
SODIUM SERPL-SCNC: 138 MMOL/L (ref 136–145)
TRIGL SERPL-MCNC: 124 MG/DL (ref ?–150)
VLDLC SERPL CALC-MCNC: 24.8 MG/DL

## 2021-10-09 LAB — ANA SER QL: NEGATIVE

## 2021-10-12 NOTE — PROGRESS NOTES
The tests for rheumatoid and lupus were negative  The liver and kidney tests are fine  The blood sugar is in prediabetes range and Your cholesterol numbers are not at goal. To provide you with the best heart health the LDL should be under 100 if you have no heart disease or history of prediabetes or diabetes. If you have a history of diabetes or prediabetes  or heart disease the LDL goal should be less than 70. Avoid fried food, fast food and junk food. Also move more each day. aim for at least 150 minutes of activity each week.  I want to recheck the cholesterol levels in three months

## 2021-10-14 ENCOUNTER — TELEPHONE (OUTPATIENT)
Dept: FAMILY MEDICINE CLINIC | Age: 56
End: 2021-10-14

## 2022-03-19 PROBLEM — K80.71: Status: ACTIVE | Noted: 2018-01-28

## 2022-03-20 ENCOUNTER — HOSPITAL ENCOUNTER (EMERGENCY)
Age: 57
Discharge: ANOTHER HOSPITAL | End: 2022-03-20
Attending: STUDENT IN AN ORGANIZED HEALTH CARE EDUCATION/TRAINING PROGRAM
Payer: COMMERCIAL

## 2022-03-20 ENCOUNTER — HOSPITAL ENCOUNTER (OUTPATIENT)
Age: 57
Setting detail: OBSERVATION
Discharge: HOME | End: 2022-03-22
Attending: EMERGENCY MEDICINE | Admitting: HOSPITALIST
Payer: COMMERCIAL

## 2022-03-20 ENCOUNTER — APPOINTMENT (OUTPATIENT)
Dept: GENERAL RADIOLOGY | Age: 57
End: 2022-03-20
Attending: STUDENT IN AN ORGANIZED HEALTH CARE EDUCATION/TRAINING PROGRAM
Payer: COMMERCIAL

## 2022-03-20 VITALS
OXYGEN SATURATION: 100 % | BODY MASS INDEX: 31.32 KG/M2 | TEMPERATURE: 98.2 F | SYSTOLIC BLOOD PRESSURE: 139 MMHG | WEIGHT: 194.89 LBS | HEIGHT: 66 IN | HEART RATE: 69 BPM | RESPIRATION RATE: 16 BRPM | DIASTOLIC BLOOD PRESSURE: 81 MMHG

## 2022-03-20 DIAGNOSIS — I21.4 NSTEMI (NON-ST ELEVATED MYOCARDIAL INFARCTION) (HCC): Primary | ICD-10-CM

## 2022-03-20 DIAGNOSIS — I21.4 NON-STEMI (NON-ST ELEVATED MYOCARDIAL INFARCTION) (HCC): ICD-10-CM

## 2022-03-20 PROBLEM — R07.9 CHEST PAIN: Status: ACTIVE | Noted: 2022-03-20

## 2022-03-20 LAB
ALBUMIN SERPL-MCNC: 3.5 G/DL (ref 3.5–5)
ALBUMIN/GLOB SERPL: 0.9 {RATIO} (ref 1.1–2.2)
ALP SERPL-CCNC: 76 U/L (ref 45–117)
ALT SERPL-CCNC: 19 U/L (ref 12–78)
ANION GAP SERPL CALC-SCNC: 8 MMOL/L (ref 5–15)
AST SERPL-CCNC: 15 U/L (ref 15–37)
BASOPHILS # BLD: 0 K/UL (ref 0–0.1)
BASOPHILS NFR BLD: 0 % (ref 0–1)
BILIRUB SERPL-MCNC: 0.4 MG/DL (ref 0.2–1)
BUN SERPL-MCNC: 10 MG/DL (ref 6–20)
BUN/CREAT SERPL: 13 (ref 12–20)
CALCIUM SERPL-MCNC: 8.9 MG/DL (ref 8.5–10.1)
CHLORIDE SERPL-SCNC: 107 MMOL/L (ref 97–108)
CHOLEST SERPL-MCNC: 142 MG/DL
CO2 SERPL-SCNC: 29 MMOL/L (ref 21–32)
CREAT SERPL-MCNC: 0.8 MG/DL (ref 0.55–1.02)
DIFFERENTIAL METHOD BLD: ABNORMAL
EOSINOPHIL # BLD: 0.1 K/UL (ref 0–0.4)
EOSINOPHIL NFR BLD: 1 % (ref 0–7)
ERYTHROCYTE [DISTWIDTH] IN BLOOD BY AUTOMATED COUNT: 12.7 % (ref 11.5–14.5)
GLOBULIN SER CALC-MCNC: 3.7 G/DL (ref 2–4)
GLUCOSE SERPL-MCNC: 96 MG/DL (ref 65–100)
HCT VFR BLD AUTO: 39.2 % (ref 35–47)
HDLC SERPL-MCNC: 55 MG/DL
HDLC SERPL: 2.6 {RATIO} (ref 0–5)
HGB BLD-MCNC: 12.2 G/DL (ref 11.5–16)
IMM GRANULOCYTES # BLD AUTO: 0 K/UL (ref 0–0.04)
IMM GRANULOCYTES NFR BLD AUTO: 0 % (ref 0–0.5)
LDLC SERPL CALC-MCNC: 66 MG/DL (ref 0–100)
LIPASE SERPL-CCNC: 59 U/L (ref 73–393)
LYMPHOCYTES # BLD: 1.4 K/UL (ref 0.8–3.5)
LYMPHOCYTES NFR BLD: 17 % (ref 12–49)
MCH RBC QN AUTO: 28.6 PG (ref 26–34)
MCHC RBC AUTO-ENTMCNC: 31.1 G/DL (ref 30–36.5)
MCV RBC AUTO: 92 FL (ref 80–99)
MONOCYTES # BLD: 0.4 K/UL (ref 0–1)
MONOCYTES NFR BLD: 5 % (ref 5–13)
NEUTS SEG # BLD: 6.6 K/UL (ref 1.8–8)
NEUTS SEG NFR BLD: 77 % (ref 32–75)
NRBC # BLD: 0 K/UL (ref 0–0.01)
NRBC BLD-RTO: 0 PER 100 WBC
PLATELET # BLD AUTO: 284 K/UL (ref 150–400)
PMV BLD AUTO: 10 FL (ref 8.9–12.9)
POTASSIUM SERPL-SCNC: 3.5 MMOL/L (ref 3.5–5.1)
PROT SERPL-MCNC: 7.2 G/DL (ref 6.4–8.2)
RBC # BLD AUTO: 4.26 M/UL (ref 3.8–5.2)
SODIUM SERPL-SCNC: 144 MMOL/L (ref 136–145)
TRIGL SERPL-MCNC: 105 MG/DL (ref ?–150)
TROPONIN-HIGH SENSITIVITY: 91 NG/L (ref 0–51)
TROPONIN-HIGH SENSITIVITY: 99 NG/L (ref 0–51)
VLDLC SERPL CALC-MCNC: 21 MG/DL
WBC # BLD AUTO: 8.6 K/UL (ref 3.6–11)

## 2022-03-20 PROCEDURE — 36415 COLL VENOUS BLD VENIPUNCTURE: CPT

## 2022-03-20 PROCEDURE — 99285 EMERGENCY DEPT VISIT HI MDM: CPT

## 2022-03-20 PROCEDURE — 96372 THER/PROPH/DIAG INJ SC/IM: CPT

## 2022-03-20 PROCEDURE — 84484 ASSAY OF TROPONIN QUANT: CPT

## 2022-03-20 PROCEDURE — 85025 COMPLETE CBC W/AUTO DIFF WBC: CPT

## 2022-03-20 PROCEDURE — 93005 ELECTROCARDIOGRAM TRACING: CPT

## 2022-03-20 PROCEDURE — G0378 HOSPITAL OBSERVATION PER HR: HCPCS

## 2022-03-20 PROCEDURE — 80061 LIPID PANEL: CPT

## 2022-03-20 PROCEDURE — 80053 COMPREHEN METABOLIC PANEL: CPT

## 2022-03-20 PROCEDURE — 71046 X-RAY EXAM CHEST 2 VIEWS: CPT

## 2022-03-20 PROCEDURE — 74011250637 HC RX REV CODE- 250/637: Performed by: HOSPITALIST

## 2022-03-20 PROCEDURE — 83690 ASSAY OF LIPASE: CPT

## 2022-03-20 PROCEDURE — 74011250637 HC RX REV CODE- 250/637: Performed by: STUDENT IN AN ORGANIZED HEALTH CARE EDUCATION/TRAINING PROGRAM

## 2022-03-20 PROCEDURE — 74011000250 HC RX REV CODE- 250: Performed by: STUDENT IN AN ORGANIZED HEALTH CARE EDUCATION/TRAINING PROGRAM

## 2022-03-20 PROCEDURE — 74011250636 HC RX REV CODE- 250/636: Performed by: STUDENT IN AN ORGANIZED HEALTH CARE EDUCATION/TRAINING PROGRAM

## 2022-03-20 PROCEDURE — 96374 THER/PROPH/DIAG INJ IV PUSH: CPT

## 2022-03-20 RX ORDER — ACETAMINOPHEN 325 MG/1
650 TABLET ORAL
Status: DISCONTINUED | OUTPATIENT
Start: 2022-03-20 | End: 2022-03-22 | Stop reason: HOSPADM

## 2022-03-20 RX ORDER — ENOXAPARIN SODIUM 100 MG/ML
1 INJECTION SUBCUTANEOUS EVERY 12 HOURS
Status: DISCONTINUED | OUTPATIENT
Start: 2022-03-21 | End: 2022-03-22 | Stop reason: HOSPADM

## 2022-03-20 RX ORDER — BUPROPION HYDROCHLORIDE 100 MG/1
TABLET, EXTENDED RELEASE ORAL
COMMUNITY
End: 2022-07-20

## 2022-03-20 RX ORDER — ATORVASTATIN CALCIUM 20 MG/1
20 TABLET, FILM COATED ORAL
Status: DISCONTINUED | OUTPATIENT
Start: 2022-03-20 | End: 2022-03-20

## 2022-03-20 RX ORDER — GUAIFENESIN 100 MG/5ML
81 LIQUID (ML) ORAL DAILY
Status: DISCONTINUED | OUTPATIENT
Start: 2022-03-21 | End: 2022-03-21

## 2022-03-20 RX ORDER — PANTOPRAZOLE SODIUM 40 MG/1
40 TABLET, DELAYED RELEASE ORAL
Status: DISCONTINUED | OUTPATIENT
Start: 2022-03-21 | End: 2022-03-22 | Stop reason: HOSPADM

## 2022-03-20 RX ORDER — HYDROXYZINE HYDROCHLORIDE 10 MG/1
10 TABLET, FILM COATED ORAL
COMMUNITY
End: 2022-07-20

## 2022-03-20 RX ORDER — CARVEDILOL 3.12 MG/1
3.12 TABLET ORAL 2 TIMES DAILY WITH MEALS
Status: DISCONTINUED | OUTPATIENT
Start: 2022-03-20 | End: 2022-03-21

## 2022-03-20 RX ORDER — DIPHENHYDRAMINE HCL 25 MG
25 CAPSULE ORAL ONCE
Status: COMPLETED | OUTPATIENT
Start: 2022-03-20 | End: 2022-03-20

## 2022-03-20 RX ORDER — ASCORBIC ACID 100 MG
TABLET,CHEWABLE ORAL
COMMUNITY
End: 2022-07-20

## 2022-03-20 RX ORDER — CETIRIZINE HCL 10 MG
10 TABLET ORAL
COMMUNITY
End: 2022-07-20

## 2022-03-20 RX ORDER — ENOXAPARIN SODIUM 100 MG/ML
1 INJECTION SUBCUTANEOUS EVERY 12 HOURS
Status: DISCONTINUED | OUTPATIENT
Start: 2022-03-20 | End: 2022-03-20 | Stop reason: HOSPADM

## 2022-03-20 RX ORDER — ATORVASTATIN CALCIUM 20 MG/1
40 TABLET, FILM COATED ORAL
Status: DISCONTINUED | OUTPATIENT
Start: 2022-03-20 | End: 2022-03-20

## 2022-03-20 RX ORDER — CHOLECALCIFEROL (VITAMIN D3) 125 MCG
100 CAPSULE ORAL EVERY EVENING
COMMUNITY

## 2022-03-20 RX ORDER — ASPIRIN 325 MG
325 TABLET ORAL
Status: COMPLETED | OUTPATIENT
Start: 2022-03-20 | End: 2022-03-20

## 2022-03-20 RX ADMIN — CARVEDILOL 3.12 MG: 3.12 TABLET, FILM COATED ORAL at 19:08

## 2022-03-20 RX ADMIN — ASPIRIN 325 MG: 325 TABLET ORAL at 12:45

## 2022-03-20 RX ADMIN — LIDOCAINE HYDROCHLORIDE 40 ML: 20 SOLUTION ORAL; TOPICAL at 12:03

## 2022-03-20 RX ADMIN — SODIUM CHLORIDE, PRESERVATIVE FREE 20 MG: 5 INJECTION INTRAVENOUS at 12:04

## 2022-03-20 RX ADMIN — DIPHENHYDRAMINE HYDROCHLORIDE 25 MG: 25 CAPSULE ORAL at 19:08

## 2022-03-20 RX ADMIN — ENOXAPARIN SODIUM 90 MG: 100 INJECTION SUBCUTANEOUS at 12:44

## 2022-03-20 NOTE — PROGRESS NOTES
Pt admitted from ED. Pt AO x 4 with daughter at bedside. Pt /92 HR 72. Coreg dose was given. Pt also presenting with slight redness on arms and right eye. RN contacted Dr Fab Greogrio and one time dose of benadryl was given. Pt placed on cardiac monitor box. Pt ambulated to bathroom to void. No complaints of chest pain at this time. Pt will be NPO at midnight for possible cath.

## 2022-03-20 NOTE — CONSULTS
[address] Cardiology Consultation    Date of Consult:  03/20/22  Date of Admission: 3/20/2022  Primary Cardiologist: none  Physician Requesting consult: Dr. Fab Gregorio    Assessment/Plan:  1. NSTEMI - hs Tn peak 99. ECG without ischemic changes. Typical pain concerning for angina and type I NSTEMI. Possible vasospasm. - continue aspirin and lovenox 1 mg/kg BID  - NPO at midnight for left heart cath tomorrow  2. H/o diabetes - well controlled off meds after gastric sleeve  3. S/p gastric sleeve      Thank you for the opportunity to participate in the care of Yinka Holguin and please do not hesitate to contact us should you have any questions. Chief Complaint / Reason for Consult:   Chest pain, elevated troponin    History of Present Illness:  Yinka Holguin is a 64 y.o. female with the below listed medical history who was is admitted from [facility] ER for NSTEMI. She woke up this morning due to epigastric pain. Had associated nausea as well. Subsequently developed chest pressure that was intermittent and was having \"spasms. \"  Felt like someone was sitting on her chest. No pain radiation or other symptoms. The symptoms persisted until she got to the emergency room. Currently having minimal chest pressure that is not as severe as this morning. No recent exertional chest pain or dyspnea. She runs on the treadmill regularly. No prior cardiac history. Father had MI in his 76s. Past Medical History:   Diagnosis Date    Diabetes [facility])        Prior to Admission medications    Medication Sig Start Date End Date Taking? Authorizing Provider   semaglutide, weight loss, (Wegovy) 0.25 mg/0.5 mL pnij 1 Pen by SubCUTAneous route every seven (7) days. 10/7/21   Garry Chaudhary MD   diclofenac (VOLTAREN) 1 % gel Apply 4 g to affected area four (4) times daily.   Patient not taking: Reported on 10/7/2021 2/11/21   Garry Chaudhary MD   ergocalciferol (ERGOCALCIFEROL) 1,250 mcg (50,000 unit) capsule TAKE 1 CAPSULE BY MOUTH EVERY 7 DAYS  Patient not taking: Reported on 10/7/2021 12/19/20   Armida Parker MD   DivigeL 1 mg/gram (0.1 %) glpk  7/15/20   Provider, Historical   terbinafine HCL (LAMISIL) 250 mg tablet Take 1 Tab by mouth daily. Patient not taking: Reported on 10/7/2021 6/24/20   Armida Parker MD   lidocaine (LIDODERM) 5 % Apply patch to the affected area for 12 hours a day and remove for 12 hours a day. Patient not taking: Reported on 10/7/2021 12/31/19   Armida Parker MD   albuterol (PROVENTIL HFA, VENTOLIN HFA, PROAIR HFA) 90 mcg/actuation inhaler Take 2 Puffs by inhalation every four (4) hours as needed for Wheezing. Patient not taking: Reported on 10/7/2021 8/12/19   Armida Parker MD   [facility]) 400 mg tablet Take 1 Tab by mouth three (3) times daily. Patient not taking: Reported on 10/7/2021 8/12/19   Armida Parker MD   multivitamin (ONE A DAY) tablet Take 1 Tab by mouth daily. Provider, Historical   HYDROcodone-acetaminophen (NORCO) 5-325 mg per tablet Take 1 Tab by mouth every four (4) hours as needed. Max Daily Amount: 6 Tabs. Patient not taking: Reported on 10/7/2021 1/31/18   Raf Lawrence MD   CALCIUM CARB/MAGNESIUM CARB (CALCIUM & MAGNESIUM CARBONATES PO) Take 1 Tab by mouth daily. Patient not taking: Reported on 10/7/2021    Provider, Historical       Current Facility-Administered Medications   Medication Dose Route Frequency    [START ON 3/21/2022] pantoprazole (PROTONIX) tablet 40 mg  40 mg Oral ACB    [START ON 3/21/2022] enoxaparin (LOVENOX) injection 90 mg  1 mg/kg SubCUTAneous Q12H    [START ON 3/21/2022] aspirin chewable tablet 81 mg  81 mg Oral DAILY    acetaminophen (TYLENOL) tablet 650 mg  650 mg Oral Q4H PRN    carvediloL (COREG) tablet 3.125 mg  3.125 mg Oral BID WITH MEALS     Current Outpatient Medications   Medication Sig    semaglutide, weight loss, (Wegovy) 0.25 mg/0.5 mL pnij 1 Pen by SubCUTAneous route every seven (7) days.     diclofenac (VOLTAREN) 1 % gel Apply 4 g to affected area four (4) times daily. (Patient not taking: Reported on 10/7/2021)    ergocalciferol (ERGOCALCIFEROL) 1,250 mcg (50,000 unit) capsule TAKE 1 CAPSULE BY MOUTH EVERY 7 DAYS (Patient not taking: Reported on 10/7/2021)    DivigeL 1 mg/gram (0.1 %) glpk  (Patient not taking: Reported on 10/7/2021)    terbinafine HCL (LAMISIL) 250 mg tablet Take 1 Tab by mouth daily. (Patient not taking: Reported on 10/7/2021)    lidocaine (LIDODERM) 5 % Apply patch to the affected area for 12 hours a day and remove for 12 hours a day. (Patient not taking: Reported on 10/7/2021)    albuterol (PROVENTIL HFA, VENTOLIN HFA, PROAIR HFA) 90 mcg/actuation inhaler Take 2 Puffs by inhalation every four (4) hours as needed for Wheezing. (Patient not taking: Reported on 10/7/2021)    guaiFENesin (ORGANIDIN) 400 mg tablet Take 1 Tab by mouth three (3) times daily. (Patient not taking: Reported on 10/7/2021)    multivitamin (ONE A DAY) tablet Take 1 Tab by mouth daily.  HYDROcodone-acetaminophen (NORCO) 5-325 mg per tablet Take 1 Tab by mouth every four (4) hours as needed. Max Daily Amount: 6 Tabs. (Patient not taking: Reported on 10/7/2021)    CALCIUM CARB/MAGNESIUM CARB (CALCIUM & MAGNESIUM CARBONATES PO) Take 1 Tab by mouth daily.  (Patient not taking: Reported on 10/7/2021)       Family History   Problem Relation Age of Onset    Elevated Lipids Mother     Diabetes Father     Hypertension Father    Willie Linder Elevated Lipids Sister     Thyroid Disease Sister     Hypertension Paternal Grandmother     Heart Disease Paternal Grandmother     Diabetes Paternal Grandmother     Hypertension Paternal Grandfather     Heart Disease Paternal Grandfather     Diabetes Paternal Grandfather     Cancer Maternal Grandmother         colon     No family h/o SCD or premature CAD    Social History     Socioeconomic History    Marital status:      Spouse name: Not on file    Number of children: Not on file    Years of education: Not on file    Highest education level: Not on file   Occupational History    Not on file   Tobacco Use    Smoking status: Light Tobacco Smoker     Packs/day: 0.10     Types: Cigars    Smokeless tobacco: Never Used   Substance and Sexual Activity    Alcohol use: Yes     Comment: socially    Drug use: No    Sexual activity: Not on file   Other Topics Concern    Not on file   Social History Narrative    Not on file     Social Determinants of Health     Financial Resource Strain:     Difficulty of Paying Living Expenses: Not on file   Food Insecurity:     Worried About Running Out of Food in the Last Year: Not on file    Ken of Food in the Last Year: Not on file   Transportation Needs:     Lack of Transportation (Medical): Not on file    Lack of Transportation (Non-Medical): Not on file   Physical Activity:     Days of Exercise per Week: Not on file    Minutes of Exercise per Session: Not on file   Stress:     Feeling of Stress : Not on file   Social Connections:     Frequency of Communication with Friends and Family: Not on file    Frequency of Social Gatherings with Friends and Family: Not on file    Attends Orthodoxy Services: Not on file    Active Member of [address] [place] [address] or Organizations: Not on file    Attends Club or Organization Meetings: Not on file    Marital Status: Not on file   Intimate Partner Violence:     Fear of Current or Ex-Partner: Not on file    Emotionally Abused: Not on file    Physically Abused: Not on file    Sexually Abused: Not on file   Housing Stability:     Unable to Pay for Housing in the Last Year: Not on file    Number of [place] in the Last Year: Not on file    Unstable Housing in the Last Year: Not on file       ROS  ROS: All other systems reviewed and were negative other than mentioned above.      Visit Vitals  /88 (BP 1 Location: Left upper arm, BP Patient Position: At rest)   Pulse 71   Temp 98.1 °F (36.7 °C)   Resp 18 SpO2 94%       No intake or output data in the 24 hours ending 03/20/22 0716     General: resting comfortably in no distress  HEENT: sclera anicteric, moist mucous membranes  Neck: supple, no JVD or HJR  CV: regular rate and rhythm, normal S1 and S2, no murmurs, rubs or gallops, 2+ radial pulses bilaterally  Lungs: no respiratory distress, lungs clear to auscultation without wheezing or rales  Abdomen: + hyperactive bowel sounds, soft, non distended, non tender  MSK: no lower extremity edema  Skin: warm to touch  Neuro: alert and oriented, answered questions appropriately  Psych: normal mood and affect     Lab Review:  BMP:   No results found for: NA, K, CL, CO2, AGAP, GLU, BUN, CREA, GFRAA, GFRNA     CBC:  Lab Results   Component Value Date/Time    WBC 8.6 03/20/2022 11:24 AM    HGB 12.2 03/20/2022 11:24 AM    HCT 39.2 03/20/2022 11:24 AM    PLATELET 437 76/55/2060 11:24 AM    MCV 92.0 03/20/2022 11:24 AM       All Cardiac Markers in the last 24 hours:  No results found for: CPK, CK, CKMMB, CKMB, RCK3, CKMBT, CKMBPOC, CKNDX, CKND1, TAMIKO, TROPT, TROIQ, JOAO, TROPT, TNIPOC, BNP, BNPP, BNPNT    Lab Results   Component Value Date/Time    Cholesterol, total 142 03/20/2022 04:31 PM    HDL Cholesterol 55 03/20/2022 04:31 PM    LDL, calculated 66 03/20/2022 04:31 PM    VLDL, calculated 21 03/20/2022 04:31 PM    Triglyceride 105 03/20/2022 04:31 PM    CHOL/HDL Ratio 2.6 03/20/2022 04:31 PM        Data Review:  ECG tracing personally reviewed: NSR, no concerning STT changes    Echocardiogram: none      Other cardiac testing: none    Other imaging: CXR unremarkable      Signed:  Omi Woodruff, [address] [facility]  03/20/22

## 2022-03-20 NOTE — ED PROVIDER NOTES
Patient is a transfer from [place]. Has already been admitted to medicine service with cardiology consultation for NSTEMI. Patient immediately reevaluated by myself when she arrived to our ED. Patient in no acute distress. She has no complaints. Vital signs within normal limits. Pupils 3 mm and reactive bilaterally. Lungs clear to auscultation bilaterally. Regular rate and rhythm. Abdomen soft nondistended no guarding or rigidity or tenderness. Legs without edema. She denies any chest pain shortness of breath nausea vomiting. Has no complaints. Will notify medicine and cardiology that she is here.

## 2022-03-20 NOTE — ROUTINE PROCESS
TRANSFER - OUT REPORT:    Verbal report given to Ajay Zapata RN(name) on Gaviota Frederick  being transferred to [facility] ER(unit) for routine progression of care       Report consisted of patients Situation, Background, Assessment and   Recommendations(SBAR). Information from the following report(s) SBAR, MAR, pain, vitals, all test results, Telemetry was reviewed with the receiving nurse. Lines:   Peripheral IV 03/20/22 Left Antecubital (Active)   Site Assessment Clean, dry, & intact 03/20/22 1126   Phlebitis Assessment 0 03/20/22 1126   Infiltration Assessment 0 03/20/22 1126   Dressing Status Clean, dry, & intact 03/20/22 1126   Dressing Type Transparent 03/20/22 1126   Hub Color/Line Status Pink 03/20/22 1126        Opportunity for questions and clarification was provided. Patient transported with: [facility] ALS monitoring reassessment at this time is improved pain to 1/10 pt is stable for transport as ordered.

## 2022-03-20 NOTE — ED TRIAGE NOTES
Pt ambulated to the treatment area with a steady gait accompanied by her . Pt states \"this morning about 9am I started having mid upper abd pain then it radiated up to my chest then I got dizzy when I got up to go to the bathroom I laid back down and the dizziness went away. I still have the chest pain now it just changes intensity its a burning all the way to my back if I burp it gets worse. \" Pt appears in no distress at this time.

## 2022-03-20 NOTE — ED PROVIDER NOTES
The patient is a 59-year-old female presenting today with chest pain. She does have a history of diabetes she reports that this morning she woke up with epigastric pain described as a spasm and burning sensation. She says that throughout the morning at progressed up into her mid chest.  No radiation to the back or shoulder. The pain now seems to be getting better but it gets worse when she has belching episodes. No diaphoresis or shortness of breath but has gotten dizzy with standing. Right now she is not having pain but was having some just prior to me walking in the room. She has no known cardiac disease however there is history of cardiac disease in her grandparents. No leg pain or leg swelling. No history of DVT or PE.            Past Medical History:   Diagnosis Date    Diabetes [facility])        Past Surgical History:   Procedure Laterality Date    HX  SECTION      x2    HX HERNIA REPAIR      HX LAP CHOLECYSTECTOMY  2018    HX TONSILLECTOMY           Family History:   Problem Relation Age of Onset    Elevated Lipids Mother     Diabetes Father     Hypertension Father    Enamorado Elevated Lipids Sister     Thyroid Disease Sister     Hypertension Paternal Grandmother     Heart Disease Paternal Grandmother     Diabetes Paternal Grandmother     Hypertension Paternal Grandfather     Heart Disease Paternal Grandfather     Diabetes Paternal Grandfather     Cancer Maternal Grandmother         colon       Social History     Socioeconomic History    Marital status:      Spouse name: Not on file    Number of children: Not on file    Years of education: Not on file    Highest education level: Not on file   Occupational History    Not on file   Tobacco Use    Smoking status: Light Tobacco Smoker     Packs/day: 0.10     Types: Cigars    Smokeless tobacco: Never Used   Substance and Sexual Activity    Alcohol use: Yes     Comment: socially    Drug use: No    Sexual activity: Not on file   Other Topics Concern    Not on file   Social History Narrative    Not on file     Social Determinants of Health     Financial Resource Strain:     Difficulty of Paying Living Expenses: Not on file   Food Insecurity:     Worried About [address] in the Last Year: Not on file    Ken of Food in the Last Year: Not on file   Transportation Needs:     Lack of Transportation (Medical): Not on file    Lack of Transportation (Non-Medical): Not on file   Physical Activity:     Days of Exercise per Week: Not on file    Minutes of Exercise per Session: Not on file   Stress:     Feeling of Stress : Not on file   Social Connections:     Frequency of Communication with Friends and Family: Not on file    Frequency of Social Gatherings with Friends and Family: Not on file    Attends Mandaen Services: Not on file    Active Member of [employer] or Organizations: Not on file    Attends Club or Organization Meetings: Not on file    Marital Status: Not on file   Intimate Partner Violence:     Fear of Current or Ex-Partner: Not on file    Emotionally Abused: Not on file    Physically Abused: Not on file    Sexually Abused: Not on file   Housing Stability:     Unable to Pay for Housing in the Last Year: Not on file    Number of [place] in the Last Year: Not on file    Unstable Housing in the Last Year: Not on file         ALLERGIES: Apple    Review of Systems   Constitutional: Negative for chills and fever. HENT: Negative for congestion and rhinorrhea. Eyes: Negative for redness and visual disturbance. Respiratory: Negative for cough and shortness of breath. Cardiovascular: Positive for chest pain. Negative for leg swelling. Gastrointestinal: Positive for abdominal pain. Negative for diarrhea, nausea and vomiting. Genitourinary: Negative for dysuria, flank pain, frequency, hematuria and urgency. Musculoskeletal: Negative for arthralgias, back pain, myalgias and neck pain.    Skin: Negative for rash and wound. Allergic/Immunologic: Negative for immunocompromised state. Neurological: Positive for dizziness. Negative for headaches. Vitals:    03/20/22 1114   BP: (!) 141/76   Pulse: 64   Resp: 14   Temp: 98.5 °F (36.9 °C)   SpO2: 99%   Weight: 88.4 kg (194 lb 14.2 oz)   Height: 5' 6\" (1.676 m)            Physical Exam  Vitals and nursing note reviewed. Constitutional:       General: She is not in acute distress. Appearance: She is well-developed. She is not diaphoretic. HENT:      Head: Normocephalic. Mouth/Throat:      Pharynx: No oropharyngeal exudate. Eyes:      General:         Right eye: No discharge. Left eye: No discharge. Pupils: Pupils are equal, round, and reactive to light. Cardiovascular:      Rate and Rhythm: Normal rate and regular rhythm. Heart sounds: Normal heart sounds. No murmur heard. No friction rub. No gallop. Comments: Equal radial, DP and PT pulses  Pulmonary:      Effort: Pulmonary effort is normal. No respiratory distress. Breath sounds: Normal breath sounds. No stridor. No wheezing or rales. Abdominal:      General: Bowel sounds are normal. There is no distension. Palpations: Abdomen is soft. Tenderness: There is no abdominal tenderness. There is no guarding or rebound. Musculoskeletal:         General: No deformity. Normal range of motion. Cervical back: Normal range of motion and neck supple. Skin:     General: Skin is warm and dry. Capillary Refill: Capillary refill takes less than 2 seconds. Findings: No rash. Neurological:      Mental Status: She is alert and oriented to person, place, and time.    Psychiatric:         Behavior: Behavior normal.          MDM       Procedures    EKG interpreted by me personally  Normal sinus rhythm rate of 67, normal axis normal intervals and no ST elevations or depressions    High-sensitivity troponin 99  Renal function acceptable  No leukocytosis or anemia    Chest x-ray negative    Patient reevaluated and updated on results. Currently not having any pain    12:40 PM I spoke to Dr. Diggs Doctor with cardiology who will see pt when there, requests repeat trop at 4 hours, agrees with ASA/Lovenox    12:43 PM d/w Dr. Bari Jolly (hospitalist) at [facility] who accepts pt in transfer    D/w transport center. Pt will need to board in ED. 12:48 PM d/w Dr. Linette Poole in ED who accepts pt in transfer               Patient is a 80-year-old female presenting today with what appears to be an NSTEMI. She woke up with epigastric spasm and pain radiating up into the chest.  No acute ischemic changes on EKG. Troponin elevated at 99. Will admit for cardiac evaluation. Lovenox and aspirin given. No ongoing pain to warrant nitroglycerin. I discussed with cardiology, hospitalist and ED physician at [address].  Patient prefers to be transferred to [address] rather than [facility]. ICD-10-CM ICD-9-CM    1. NSTEMI (non-ST elevated myocardial infarction) ([facility] [address]  I21.4 410.70      Admit    Total critical care time spent exclusive of procedures:  45  Due to a high probability of clinically significant, life threatening deterioration, the patient required my highest level of preparedness to intervene emergently and I personally spent this critical care time directly and personally managing the patient. This critical care time included obtaining a history; examining the patient; pulse oximetry; ordering and review of studies; arranging urgent treatment with development of a management plan; evaluation of patient's response to treatment; frequent reassessment; and, discussions with other providers. This critical care time was performed to assess and manage the high probability of imminent, life-threatening deterioration that could result in multi-organ failure.  It was exclusive of separately billable procedures and treating other patients and teaching time.    Chivo Nino, DO

## 2022-03-20 NOTE — ED NOTES
TRANSFER - OUT REPORT:    Verbal report given to NRP with JOSELIN(name) on Cecille Rider  being transferred to [facility] ER(unit) for routine progression of care       Report consisted of patients Situation, Background, Assessment and   Recommendations(SBAR). Information from the following report(s) SBAR, MAR, pain, all test results, telemetry was reviewed with the receiving nurse. Lines:   Peripheral IV 03/20/22 Left Antecubital (Active)   Site Assessment Clean, dry, & intact 03/20/22 1126   Phlebitis Assessment 0 03/20/22 1126   Infiltration Assessment 0 03/20/22 1126   Dressing Status Clean, dry, & intact 03/20/22 1126   Dressing Type Transparent 03/20/22 1126   Hub Color/Line Status Pink 03/20/22 1126        Opportunity for questions and clarification was provided. Patient transported with: JOSELIN ALS monitoring reassessment at this time is improved pain 1/10 pt is stable for transport as ordered.

## 2022-03-20 NOTE — H&P
[address] [facility]  History and Physical    Date of Service:  3/20/2022  Primary Care Provider: Kyle Jernigan MD  Source of information: The patient and Chart review    Chief Complaint: Chest Pain      History of Presenting Illness:   Nikole Simeon is a 64 y.o. female with past medical history of gastric sleeve came to the hospital with a chief complaint of epigastric and chest pain. Patient states that she woke up this morning and had epigastric pain and some stomach cramps. Later she started having chest pain-midsternal-states that she feels like somebody is sitting on the chest, rates it as 8/10, nonradiating aching type of pain. She did not try any medications at home. She went to the emergency room for further evaluation. Patient states that she was nauseous. She denied any vomiting, fever, cough, shortness of breath,  Dysuria. Patient reports chronic diarrhea for which she takes rifaximin. She denies any recent travel, lower extremity swelling or any sick contacts. In the emergency room, troponin was 99. EKG did not show any ST changes. Cardiology was consulted and she was transferred from [facility] ER to [facility] for further evaluation. During my evaluation, patient states that she has slight chest pressure 1-2/10. REVIEW OF SYSTEMS:  A comprehensive review of systems was negative except for that written in the History of Present Illness. Past Medical History:   Diagnosis Date    Diabetes [facility])       Past Surgical History:   Procedure Laterality Date    HX  SECTION      x2    HX HERNIA REPAIR      HX LAP CHOLECYSTECTOMY  2018    HX TONSILLECTOMY       Prior to Admission medications    Medication Sig Start Date End Date Taking? Authorizing Provider   semaglutide, weight loss, (Wegovy) 0.25 mg/0.5 mL pnij 1 Pen by SubCUTAneous route every seven (7) days.  10/7/21   Kyle Jernigan MD   diclofenac (VOLTAREN) 1 % gel Apply 4 g to affected area four (4) times daily. Patient not taking: Reported on 10/7/2021 2/11/21   Moises Pavon MD   ergocalciferol (ERGOCALCIFEROL) 1,250 mcg (50,000 unit) capsule TAKE 1 CAPSULE BY MOUTH EVERY 7 DAYS  Patient not taking: Reported on 10/7/2021 12/19/20   Moises Pavon MD   DivigeL 1 mg/gram (0.1 %) glpk  7/15/20   Provider, Historical   terbinafine HCL (LAMISIL) 250 mg tablet Take 1 Tab by mouth daily. Patient not taking: Reported on 10/7/2021 6/24/20   Moises Pavon MD   lidocaine (LIDODERM) 5 % Apply patch to the affected area for 12 hours a day and remove for 12 hours a day. Patient not taking: Reported on 10/7/2021 12/31/19   Moises Pavon MD   albuterol (PROVENTIL HFA, VENTOLIN HFA, PROAIR HFA) 90 mcg/actuation inhaler Take 2 Puffs by inhalation every four (4) hours as needed for Wheezing. Patient not taking: Reported on 10/7/2021 8/12/19   Moises Pavon MD   [facility]) 400 mg tablet Take 1 Tab by mouth three (3) times daily. Patient not taking: Reported on 10/7/2021 8/12/19   Moises Pavon MD   multivitamin (ONE A DAY) tablet Take 1 Tab by mouth daily. Provider, Historical   HYDROcodone-acetaminophen (NORCO) 5-325 mg per tablet Take 1 Tab by mouth every four (4) hours as needed. Max Daily Amount: 6 Tabs. Patient not taking: Reported on 10/7/2021 1/31/18   Damien Loya MD   CALCIUM CARB/MAGNESIUM CARB (CALCIUM & MAGNESIUM CARBONATES PO) Take 1 Tab by mouth daily.   Patient not taking: Reported on 10/7/2021    Provider, Historical     Allergies   Allergen Reactions    Apple Itching      Family History   Problem Relation Age of Onset    Elevated Lipids Mother     Diabetes Father     Hypertension Father    Arden Solders Elevated Lipids Sister     Thyroid Disease Sister     Hypertension Paternal Grandmother     Heart Disease Paternal Grandmother     Diabetes Paternal Grandmother     Hypertension Paternal Grandfather     Heart Disease Paternal [place]ean [place] Diabetes Paternal Grandfather     Cancer Maternal Grandmother         colon      Social History:  reports that she has been smoking cigars. She has been smoking about 0.10 packs per day. She has never used smokeless tobacco. She reports current alcohol use. She reports that she does not use drugs. Family and social history were personally reviewed, all pertinent and relevant details are outlined as above. Objective:     Visit Vitals  /88 (BP 1 Location: Left upper arm, BP Patient Position: At rest)   Pulse 71   Temp 98.1 °F (36.7 °C)   Resp 18   SpO2 94%      O2 Device: None (Room air)    PHYSICAL EXAM:   General: Alert x oriented x 3, awake, no acute distress, resting in bed, pleasant female, appears to be stated age  [de-identified]: Anicteric sclera, normal conjunctiva, EOMI, moist mucus membranes  Neck: Supple  Chest: Clear to auscultation bilaterally ,no wheezing  CVS: regular rythym,normal rate, S1 S2 heard, no murmurs  Abd: Soft, non-tender, non-distended, +bowel sounds   Ext: No clubbing, no cyanosis, no edema  Neuro/Psych: Pleasant mood and affect, CN 2-12 grossly intact, sensory grossly within normal limit, Strength 5/5 in all extremities  Skin: Warm, dry, without rashes or lesions    Data Review: All diagnostic labs and studies have been reviewed.     Abnormal Labs Reviewed - No abnormal labs to display    All Micro Results     None          IMAGING:   No orders to display        ECG/ECHO:    Results for orders placed or performed during the hospital encounter of 03/20/22   EKG, 12 LEAD, INITIAL   Result Value Ref Range    Ventricular Rate 67 BPM    Atrial Rate 67 BPM    P-R Interval 146 ms    QRS Duration 98 ms    Q-T Interval 426 ms    QTC Calculation (Bezet) 450 ms    Calculated P Axis 56 degrees    Calculated R Axis 42 degrees    Calculated T Axis 41 degrees    Diagnosis       Normal sinus rhythm with sinus arrhythmia  Normal ECG  When compared with ECG of 30-JAN-2018 07:55,  No significant change was found          Assessment:   Given the patient's current clinical presentation, there is a high level of concern for decompensation if discharged from the emergency department. Complex decision making was performed, which includes reviewing the patient's available past medical records, laboratory results, and imaging studies. Active Problems:    Chest pain (3/20/2022)      #Chest pain, elevated troponin  -Concern For NSTEMI. Family history of heart disease  -She received therapeutic dose of Lovenox and 325 mg of aspirin in the emergency room. -Trend troponin, therapeutic dose Lovenox 1 mg/kg per twice daily  -Cardiology consulted.  -add betablocker    #History of gastric sleeve: We will give Protonix as she complains of some epigastric pain as well  #Chronic diarrhea-patient states that she takes rifaximin      DIET: DIET NPO   ISOLATION PRECAUTIONS: There are currently no Active Isolations  CODE STATUS: Full Code   DVT PROPHYLAXIS: Lovenox  FUNCTIONAL STATUS PRIOR TO HOSPITALIZATION: Fully active and ambulatory; able to carry on all self-care without restriction. EARLY MOBILITY ASSESSMENT: Recommend routine ambulation while hospitalized with the assistance of nursing staff  ANTICIPATED DISCHARGE: 24-48 hours. Signed By: Adán Vance MD     March 20, 2022         Please note that this dictation may have been completed with Dragon, the [employer] voice recognition software. Quite often unanticipated grammatical, syntax, homophones, and other interpretive errors are inadvertently transcribed by the computer software. Please disregard these errors. Please excuse any errors that have escaped final proofreading.

## 2022-03-20 NOTE — ED TRIAGE NOTES
Pt arrives EMS from [facility] with c/o \"cramping\" in her upper epigastric which spread to epigastric. Pt reports level 1. No N/V/D.

## 2022-03-20 NOTE — PROGRESS NOTES
Problem: Falls - Risk of  Goal: *Absence of Falls  Description: Document Gordon Beer Fall Risk and appropriate interventions in the flowsheet.   Outcome: Progressing Towards Goal  Note: Fall Risk Interventions:            Medication Interventions: Teach patient to arise slowly,Evaluate medications/consider consulting pharmacy

## 2022-03-21 LAB
BASOPHILS # BLD: 0 K/UL (ref 0–0.1)
BASOPHILS NFR BLD: 0 % (ref 0–1)
D DIMER PPP FEU-MCNC: 2.66 MG/L FEU (ref 0–0.65)
DIFFERENTIAL METHOD BLD: ABNORMAL
EOSINOPHIL # BLD: 0.1 K/UL (ref 0–0.4)
EOSINOPHIL NFR BLD: 2 % (ref 0–7)
ERYTHROCYTE [DISTWIDTH] IN BLOOD BY AUTOMATED COUNT: 12.8 % (ref 11.5–14.5)
HCT VFR BLD AUTO: 36 % (ref 35–47)
HGB BLD-MCNC: 11.3 G/DL (ref 11.5–16)
IMM GRANULOCYTES # BLD AUTO: 0 K/UL (ref 0–0.04)
IMM GRANULOCYTES NFR BLD AUTO: 0 % (ref 0–0.5)
LYMPHOCYTES # BLD: 2 K/UL (ref 0.8–3.5)
LYMPHOCYTES NFR BLD: 29 % (ref 12–49)
MCH RBC QN AUTO: 28.9 PG (ref 26–34)
MCHC RBC AUTO-ENTMCNC: 31.4 G/DL (ref 30–36.5)
MCV RBC AUTO: 92.1 FL (ref 80–99)
MONOCYTES # BLD: 0.4 K/UL (ref 0–1)
MONOCYTES NFR BLD: 5 % (ref 5–13)
NEUTS SEG # BLD: 4.4 K/UL (ref 1.8–8)
NEUTS SEG NFR BLD: 64 % (ref 32–75)
NRBC # BLD: 0 K/UL (ref 0–0.01)
NRBC BLD-RTO: 0 PER 100 WBC
PLATELET # BLD AUTO: 262 K/UL (ref 150–400)
PMV BLD AUTO: 10.4 FL (ref 8.9–12.9)
RBC # BLD AUTO: 3.91 M/UL (ref 3.8–5.2)
TROPONIN-HIGH SENSITIVITY: 94 NG/L (ref 0–51)
WBC # BLD AUTO: 6.9 K/UL (ref 3.6–11)

## 2022-03-21 PROCEDURE — G0378 HOSPITAL OBSERVATION PER HR: HCPCS

## 2022-03-21 PROCEDURE — 99152 MOD SED SAME PHYS/QHP 5/>YRS: CPT | Performed by: INTERNAL MEDICINE

## 2022-03-21 PROCEDURE — 93458 L HRT ARTERY/VENTRICLE ANGIO: CPT | Performed by: INTERNAL MEDICINE

## 2022-03-21 PROCEDURE — 74011000636 HC RX REV CODE- 636: Performed by: INTERNAL MEDICINE

## 2022-03-21 PROCEDURE — 74011250636 HC RX REV CODE- 250/636: Performed by: INTERNAL MEDICINE

## 2022-03-21 PROCEDURE — 85379 FIBRIN DEGRADATION QUANT: CPT

## 2022-03-21 PROCEDURE — 36415 COLL VENOUS BLD VENIPUNCTURE: CPT

## 2022-03-21 PROCEDURE — 77030013744: Performed by: INTERNAL MEDICINE

## 2022-03-21 PROCEDURE — C1894 INTRO/SHEATH, NON-LASER: HCPCS | Performed by: INTERNAL MEDICINE

## 2022-03-21 PROCEDURE — 85025 COMPLETE CBC W/AUTO DIFF WBC: CPT

## 2022-03-21 PROCEDURE — 74011250636 HC RX REV CODE- 250/636: Performed by: HOSPITALIST

## 2022-03-21 PROCEDURE — 96372 THER/PROPH/DIAG INJ SC/IM: CPT

## 2022-03-21 PROCEDURE — 77030042317 HC BND COMPR HEMSTAT -B: Performed by: INTERNAL MEDICINE

## 2022-03-21 PROCEDURE — 84484 ASSAY OF TROPONIN QUANT: CPT

## 2022-03-21 PROCEDURE — 74011000250 HC RX REV CODE- 250: Performed by: INTERNAL MEDICINE

## 2022-03-21 PROCEDURE — 77030040934 HC CATH DIAG DXTERITY MEDT -A: Performed by: INTERNAL MEDICINE

## 2022-03-21 PROCEDURE — 74011250637 HC RX REV CODE- 250/637: Performed by: HOSPITALIST

## 2022-03-21 RX ORDER — METHYLPREDNISOLONE 4 MG/1
4 TABLET ORAL 3 TIMES DAILY
Status: DISCONTINUED | OUTPATIENT
Start: 2022-03-24 | End: 2022-03-22 | Stop reason: HOSPADM

## 2022-03-21 RX ORDER — LIDOCAINE HYDROCHLORIDE 10 MG/ML
INJECTION INFILTRATION; PERINEURAL AS NEEDED
Status: DISCONTINUED | OUTPATIENT
Start: 2022-03-21 | End: 2022-03-21 | Stop reason: HOSPADM

## 2022-03-21 RX ORDER — METHYLPREDNISOLONE 4 MG/1
4 TABLET ORAL
Status: DISCONTINUED | OUTPATIENT
Start: 2022-03-23 | End: 2022-03-22 | Stop reason: HOSPADM

## 2022-03-21 RX ORDER — VERAPAMIL HYDROCHLORIDE 2.5 MG/ML
INJECTION, SOLUTION INTRAVENOUS AS NEEDED
Status: DISCONTINUED | OUTPATIENT
Start: 2022-03-21 | End: 2022-03-21 | Stop reason: HOSPADM

## 2022-03-21 RX ORDER — HEPARIN SODIUM 200 [USP'U]/100ML
INJECTION, SOLUTION INTRAVENOUS
Status: COMPLETED | OUTPATIENT
Start: 2022-03-21 | End: 2022-03-21

## 2022-03-21 RX ORDER — METHYLPREDNISOLONE 4 MG/1
8 TABLET ORAL ONCE
Status: DISCONTINUED | OUTPATIENT
Start: 2022-03-22 | End: 2022-03-22 | Stop reason: HOSPADM

## 2022-03-21 RX ORDER — MIDAZOLAM HYDROCHLORIDE 1 MG/ML
INJECTION, SOLUTION INTRAMUSCULAR; INTRAVENOUS AS NEEDED
Status: DISCONTINUED | OUTPATIENT
Start: 2022-03-21 | End: 2022-03-21 | Stop reason: HOSPADM

## 2022-03-21 RX ORDER — HEPARIN SODIUM 1000 [USP'U]/ML
INJECTION, SOLUTION INTRAVENOUS; SUBCUTANEOUS AS NEEDED
Status: DISCONTINUED | OUTPATIENT
Start: 2022-03-21 | End: 2022-03-21 | Stop reason: HOSPADM

## 2022-03-21 RX ORDER — SODIUM CHLORIDE 9 MG/ML
3 INJECTION, SOLUTION INTRAVENOUS CONTINUOUS
Status: DISCONTINUED | OUTPATIENT
Start: 2022-03-21 | End: 2022-03-21

## 2022-03-21 RX ORDER — DIPHENHYDRAMINE HCL 25 MG
25 CAPSULE ORAL ONCE
Status: COMPLETED | OUTPATIENT
Start: 2022-03-21 | End: 2022-03-21

## 2022-03-21 RX ORDER — DIPHENHYDRAMINE HCL 25 MG
25 CAPSULE ORAL 3 TIMES DAILY
Status: DISCONTINUED | OUTPATIENT
Start: 2022-03-21 | End: 2022-03-22 | Stop reason: HOSPADM

## 2022-03-21 RX ORDER — METHYLPREDNISOLONE 4 MG/1
8 TABLET ORAL ONCE
Status: COMPLETED | OUTPATIENT
Start: 2022-03-21 | End: 2022-03-21

## 2022-03-21 RX ORDER — HYDROCORTISONE SODIUM SUCCINATE 100 MG/2ML
100 INJECTION, POWDER, FOR SOLUTION INTRAMUSCULAR; INTRAVENOUS ONCE
Status: COMPLETED | OUTPATIENT
Start: 2022-03-21 | End: 2022-03-21

## 2022-03-21 RX ORDER — CETIRIZINE HCL 10 MG
10 TABLET ORAL DAILY
Status: DISCONTINUED | OUTPATIENT
Start: 2022-03-21 | End: 2022-03-22 | Stop reason: HOSPADM

## 2022-03-21 RX ORDER — SODIUM CHLORIDE 9 MG/ML
75 INJECTION, SOLUTION INTRAVENOUS CONTINUOUS
Status: DISCONTINUED | OUTPATIENT
Start: 2022-03-22 | End: 2022-03-22 | Stop reason: HOSPADM

## 2022-03-21 RX ORDER — METHYLPREDNISOLONE 4 MG/1
4 TABLET ORAL
Status: DISCONTINUED | OUTPATIENT
Start: 2022-03-26 | End: 2022-03-22 | Stop reason: HOSPADM

## 2022-03-21 RX ORDER — METHYLPREDNISOLONE 4 MG/1
4 TABLET ORAL ONCE
Status: COMPLETED | OUTPATIENT
Start: 2022-03-21 | End: 2022-03-21

## 2022-03-21 RX ORDER — FENTANYL CITRATE 50 UG/ML
INJECTION, SOLUTION INTRAMUSCULAR; INTRAVENOUS AS NEEDED
Status: DISCONTINUED | OUTPATIENT
Start: 2022-03-21 | End: 2022-03-21 | Stop reason: HOSPADM

## 2022-03-21 RX ORDER — METHYLPREDNISOLONE 4 MG/1
4 TABLET ORAL
Status: DISCONTINUED | OUTPATIENT
Start: 2022-03-22 | End: 2022-03-22 | Stop reason: HOSPADM

## 2022-03-21 RX ORDER — METHYLPREDNISOLONE 4 MG/1
4 TABLET ORAL 2 TIMES DAILY
Status: DISCONTINUED | OUTPATIENT
Start: 2022-03-25 | End: 2022-03-22 | Stop reason: HOSPADM

## 2022-03-21 RX ORDER — SODIUM CHLORIDE 9 MG/ML
1.5 INJECTION, SOLUTION INTRAVENOUS CONTINUOUS
Status: DISCONTINUED | OUTPATIENT
Start: 2022-03-21 | End: 2022-03-21

## 2022-03-21 RX ADMIN — CARVEDILOL 3.12 MG: 3.12 TABLET, FILM COATED ORAL at 07:06

## 2022-03-21 RX ADMIN — ENOXAPARIN SODIUM 90 MG: 100 INJECTION SUBCUTANEOUS at 00:17

## 2022-03-21 RX ADMIN — PANTOPRAZOLE SODIUM 40 MG: 40 TABLET, DELAYED RELEASE ORAL at 07:06

## 2022-03-21 RX ADMIN — METHYLPREDNISOLONE 8 MG: 4 TABLET ORAL at 20:27

## 2022-03-21 RX ADMIN — DIPHENHYDRAMINE HYDROCHLORIDE 25 MG: 25 CAPSULE ORAL at 11:44

## 2022-03-21 RX ADMIN — HYDROCORTISONE SODIUM SUCCINATE 100 MG: 100 INJECTION, POWDER, FOR SOLUTION INTRAMUSCULAR; INTRAVENOUS at 14:05

## 2022-03-21 RX ADMIN — ASPIRIN 81 MG 81 MG: 81 TABLET ORAL at 09:47

## 2022-03-21 RX ADMIN — METHYLPREDNISOLONE 4 MG: 4 TABLET ORAL at 20:28

## 2022-03-21 RX ADMIN — SODIUM CHLORIDE 3 ML/KG/HR: 9 INJECTION, SOLUTION INTRAVENOUS at 14:05

## 2022-03-21 RX ADMIN — METHYLPREDNISOLONE 8 MG: 4 TABLET ORAL at 23:08

## 2022-03-21 RX ADMIN — CETIRIZINE HYDROCHLORIDE 10 MG: 10 TABLET, FILM COATED ORAL at 19:13

## 2022-03-21 RX ADMIN — DIPHENHYDRAMINE HCL 25 MG: 25 CAPSULE ORAL at 19:13

## 2022-03-21 RX ADMIN — DIPHENHYDRAMINE HCL 25 MG: 25 CAPSULE ORAL at 23:08

## 2022-03-21 NOTE — PROGRESS NOTES
Admission Medication Reconciliation:    Information obtained from:  Patient  RxQuery data available¹:  YES    Comments/Recommendations: Updated PTA meds/reviewed patient's allergies. 1)  Patient was an good historian. Pt is currently on a compounded estradiol/estriol/progesterone capsule po qhs. Pt was unsure of the dosage but her  is bringing in the medication tonight (3/20/22) and was advised to give to the nurse. 2)  Medication changes (since last review): Added  -  bupropion  mg po qam prn  - vitamin D3 100 mcg po qhs  - hydroxyzine 10 mg po qhs prn  - rifaximin 550 mg po tid for 14 days  - compounded estradiol/estriol/progesterone (dose unknown)  - zyrtec 10mg po daily prn  - kelp with iodine 150 mcg po twice a week    Adjusted  - semaglutide 0.25mg/0.5 mL SQ every 7 days --> 2.4mg/0.75mL SQ every 7 days     Removed  - albuterol  - calcium carbonate  - diclofenac gel  - divigel  - guaifenesin  - hydrocodone- acetaminophen  - lidocaine patch  - terbinafine      ¹RxQuery pharmacy benefit data reflects medications filled and processed through the patient's insurance, however   this data does NOT capture whether the medication was picked up or is currently being taken by the patient. Allergies:  Apple    Significant PMH/Disease States:   Past Medical History:   Diagnosis Date    Diabetes [facility])      Chief Complaint for this Admission:    Chief Complaint   Patient presents with    Chest Pain     Prior to Admission Medications:   Prior to Admission Medications   Prescriptions Last Dose Informant Taking? OTHER,NON-FORMULARY, 3/18/2022  Yes   Sig: Compounded Estradiol/Estriol/Progesterone 1 cap po qhs   buPROPion SR (WELLBUTRIN SR) 100 mg SR tablet 3/19/2022 at Unknown time  Yes   Sig: Take 100 mg po qam prn.   cetirizine (ZyrTEC) 10 mg tablet   Yes   Sig: Take 10 mg by mouth daily as needed for Allergies.    cholecalciferol, vitamin D3, (Vitamin D3) 50 mcg (2,000 unit) tab 3/19/2022 at Unknown time  Yes   Sig: Take 100 mcg by mouth every evening.   hydrOXYzine HCL (ATARAX) 10 mg tablet 3/19/2022 at Unknown time  Yes   Sig: Take 10 mg by mouth nightly as needed. iodine (Kelp, iodine,) 150 mcg tab   Yes   Sig: Take 150 mcg po twice a week. multivitamin (ONE A DAY) tablet 3/19/2022 at Unknown time  Yes   Sig: Take 1 Tab by mouth daily. rifAXIMin (XIFAXAN) 550 mg tablet 3/19/2022 at Unknown time  Yes   Sig: Take 550 mg by mouth three (3) times daily (with meals). For 14 days. Start date: 3/7/22. End date: 3/21/22. semaglutide, weight loss, 2.4 mg/0.75 mL pnij 3/15/2022  Yes   Si.4 mg by SubCUTAneous route every seven (7) days. Facility-Administered Medications: None     Please contact the main inpatient pharmacy with any questions or concerns at [phone] and we will direct you to the clinical pharmacist covering this patient's care while in-house.    Roxana Cruz

## 2022-03-21 NOTE — PROGRESS NOTES
Cardiology Progress Note  3/21/2022    Admit Date: 3/20/2022  Admit Diagnosis: Chest pain [R07.9]  CC:  Chest pain    Assessment/Plan:  1. 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 No obstructive CAD noted. This is usually a benign variant and unclear if mild troponin elevation is related to this. ECG without ischemic changes. Discussed with Dr. Moreno Yoder, given elevated d-dimer and she is on hormone replacement, will plan to obtain CTA chest tomorrow if renal function stable. The CTA may be delineate the course of the anomalous left circumflex as well. - hold aspirin and coreg due to new rash that started prior to receiving contrast for cath. 2. H/o diabetes - well controlled off meds after gastric sleeve  3. S/p gastric sleeve      Thank you for this consult. We will continue to follow along. Please contact us for any further questions or concerns. Hospital problem list     Active Problems:    Chest pain (3/20/2022)                                                          Subjective:     No symptoms other that mild diffuse rash and itching. Started prior to receiving contrast for cath today. ROS: All other systems reviewed and were negative other than mentioned above. No change in family and social history from H&P/Consult note.     Objective:    Physical Exam:  24 hr VS reviewed, overall VSSAF  Temp (24hrs), Av.3 °F (36.8 °C), Min:98.1 °F (36.7 °C), Max:99 °F (37.2 °C)    Patient Vitals for the past 8 hrs:   Pulse   22 1615 70   22 1600 77   22 1545 67   22 1530 64   22 1515 64   22 1502 67   22 1444 68   22 1424 (!) 57   22 1400 68   22 1200 (!) 56   22 1111 71   22 1000 69    Patient Vitals for the past 8 hrs:   Resp   22 1615 11   22 1600 20   22 1545 15   22 1530 15 03/21/22 1515 15   03/21/22 1502 14   03/21/22 1424 16   03/21/22 1400 19   03/21/22 1111 17    Patient Vitals for the past 8 hrs:   BP   03/21/22 1615 (!) 146/85   03/21/22 1600 (!) 145/74   03/21/22 1545 130/86   03/21/22 1530 139/78   03/21/22 1515 126/65   03/21/22 1502 129/63   03/21/22 1444 128/66   03/21/22 1424 (!) 163/83   03/21/22 1400 (!) 157/98   03/21/22 1111 (!) 149/82          Intake/Output Summary (Last 24 hours) at 3/21/2022 1631  Last data filed at 3/21/2022 0400  Gross per 24 hour   Intake 477 ml   Output 0 ml   Net 477 ml       General: resting comfortably in no distress  HEENT: sclera anicteric, moist mucous membranes  Neck: supple, no JVD or HJR  CV: regular rate and rhythm, normal S1 and S2, no murmurs, rubs or gallops, 2+ radial pulses bilaterally  Lungs: no respiratory distress, lungs clear to auscultation without wheezing or rales  Abdomen:  soft, non distended, non tender  MSK: no lower extremity edema  Skin: periorbital, bilateral arms and left foot hives  Neuro: alert and oriented, answered questions appropriately  Psych: normal mood and affect    Data Review:  Lab results reviewed as noted below. Current medications reviewed as noted below. Telemetry independently reviewed : [x]  sinus    []  chronic afib     []  par afib    []  NSVT    ECG independently reviewed: []  NSR    []  no significant changes   [x]  no new ECG provided for review    No results for input(s): PH, PCO2, PO2 in the last 72 hours. No results for input(s): CPK, CKMB in the last 72 hours.     No lab exists for component: TROPONINI  Recent Labs     03/21/22  0357 03/20/22  1124   NA  --  144   K  --  3.5   CL  --  107   CO2  --  29   BUN  --  10   CREA  --  0.80   GLU  --  96   CA  --  8.9   ALB  --  3.5   WBC 6.9 8.6   HGB 11.3* 12.2   HCT 36.0 39.2    284     Recent Labs     03/20/22  1124   AP 76   TBILI 0.4   TP 7.2   ALB 3.5   GLOB 3.7   LPSE 59*     No results for input(s): INR, PTP, APTT, INREXT in the last 72 hours. No results for input(s): FE, TIBC, PSAT, FERR in the last 72 hours. Lab Results   Component Value Date/Time    Glucose (POC) 119 (H) 01/31/2018 11:09 AM    Glucose (POC) 127 (H) 01/31/2018 06:27 AM    Glucose (POC) 170 (H) 01/30/2018 10:07 PM    Glucose (POC) 109 (H) 01/28/2018 09:16 PM       Current Facility-Administered Medications   Medication Dose Route Frequency    0.9% sodium chloride infusion  1.5 mL/kg/hr IntraVENous CONTINUOUS    pantoprazole (PROTONIX) tablet 40 mg  40 mg Oral ACB    [Held by provider] enoxaparin (LOVENOX) injection 90 mg  1 mg/kg SubCUTAneous Q12H    aspirin chewable tablet 81 mg  81 mg Oral DAILY    acetaminophen (TYLENOL) tablet 650 mg  650 mg Oral Q4H PRN    carvediloL (COREG) tablet 3.125 mg  3.125 mg Oral BID WITH MEALS         Omi Montano, [address] [facility]  03/21/22

## 2022-03-21 NOTE — PROGRESS NOTES
Bedside shift change report given to [address] [place] [address] Jared (oncoming nurse) by Rohankrystal Delatorre (offgoing nurse). Report included the following information SBAR, Kardex, Intake/Output, MAR and Recent Results and Cardiac Rythmn. Pt AO x 4.  at bedside. Pt is NPO at midnight. Elevated BP coreg was given at 1900. Trops and D Dimer due to be drawn later tonight. Also reported was pt c/o itching and redness noted which benadryl was provided.

## 2022-03-21 NOTE — PROGRESS NOTES
TRANSFER - IN REPORT:    Verbal report received from [facility] CVT on Wai Myles  being received from procedure area for routine progression of care. Report consisted of patients Situation, Background, Assessment and Recommendations(SBAR). Information from the following report(s) SBAR, Procedure Summary, MAR, Recent Results and Cardiac Rhythm Sinus Bradycardia was reviewed with the receiving clinician. Opportunity for questions and clarification was provided. Assessment completed upon patients arrival to [address] [place] [address] and care assumed. Cardiac Cath Lab Recovery Arrival Note:    Wai Myles arrived to [facility] recovery area. Patient procedure= LHC. Patient on cardiac monitor, non-invasive blood pressure, SPO2 monitor. On Room Air . IV  of NS on pump at 261 ml/hr. Patient status doing well without problems. Patient is A&Ox 4. Patient reports No Pain. PROCEDURE SITE CHECK:    Procedure site:without any bleeding and No Hematoma, No pain/discomfort reported at procedure site. No change in patient status. Continue to monitor patient and status.

## 2022-03-21 NOTE — PROGRESS NOTES
Cardiac Cath Lab Procedure Area Arrival Note:    Cecille Rider arrived to Cardiac Cath Lab, Procedure Area. Patient identifiers verified with NAME and DATE OF BIRTH. Procedure verified with patient. Consent forms verified. Allergies verified. Patient informed of procedure and plan of care. Questions answered with review. Patient voiced understanding of procedure and plan of care. Patient on cardiac monitor, non-invasive blood pressure, SPO2 monitor. On RA and placed on O2 @ 2 lpm via NC.  IV of NS on pump at 261 ml/hr. Patient status doing well without problems. Patient is A&Ox 4. Patient reports no chest pain or dyspnea. Patient medicated during procedure with orders obtained and verified by Dr. Brad Glover. Refer to patients Cardiac Cath Lab PROCEDURE REPORT for vital signs, assessment, status, and response during procedure, printed at end of case. Printed report on chart or scanned into chart. 14:50- Transfer to [facility] RR from Procedure Area    Verbal report given to RT Pj(R) on Cecille Rider being transferred to Cardiac Cath Lab  for routine progression of care   Patient is post [facility] procedure. Patient stable upon transfer to . Report consisted of patients Situation, Background, Assessment and   Recommendations(SBAR). Information from the following report(s) Procedure Summary was reviewed with the receiving nurse. Opportunity for questions and clarification was provided. Patient medicated during procedure with orders obtained and verified by Ronal Holliday. Refer to patient PROCEDURE REPORT for vital signs, assessment, status, and response during procedure.

## 2022-03-21 NOTE — PROGRESS NOTES
[facility] Adult  Hospitalist Group                                                                                          Hospitalist Progress Note  Jericho Arana MD  Answering service: [phone] from in house phone        Date of Service:  3/21/2022  NAME:  Nishant Miller  :  1965  MRN:  [id number]      Admission Summary:   64 y.o. female with past medical history of gastric sleeve came to the hospital with a chief complaint of epigastric and chest pain. Was found to have troponin elevation          Interval history / Subjective:   Patient seen and examined after procedure. She denied chest pain today. She has some swelling of ankles,itching,rt eye lid swelling-urticaria     Assessment & Plan:     #Chest pain  -NSTEMI ruled out  -cardiac cath results reviewed, no CAD  -will get CT chest angio tomorrow as D dimer elevated and on hormone replacement therapy     Urticaria,allergic reaction:  Unclear etiology,she received multiple new meds yesterday- hold coreg,statin,aspirin  Medrol dose pack,benadryl,zyrtec    #History of gastric sleeve:protonix    #Chronic diarrhea-patient states that she takes rifaximin             Code status: full  Prophylaxis: lovenox  Care Plan discussed with: patient,nurse  Anticipated Disposition: home     Hospital Problems  Date Reviewed: 2019          Codes Class Noted POA    Chest pain ICD-10-CM: R07.9  ICD-9-CM: 786.50  3/20/2022 Unknown                Review of Systems:   As per HPI      Vital Signs:    Last 24hrs VS reviewed since prior progress note.  Most recent are:  Visit Vitals  BP (!) 130/54 (BP 1 Location: Left upper arm, BP Patient Position: At rest)   Pulse 77   Temp 98 °F (36.7 °C)   Resp 18   Ht 5' 6\" (1.676 m)   Wt 87.1 kg (192 lb 0.3 oz)   SpO2 99%   BMI 30.99 kg/m²         Intake/Output Summary (Last 24 hours) at 3/21/2022 1859  Last data filed at 3/21/2022 1709  Gross per 24 hour   Intake 957 ml   Output 0 ml   Net 957 jessica        Physical Examination:     I had a face to face encounter with this patient and independently examined them on 3/21/2022 as outlined below:          Constitutional:  No acute distress, cooperative, pleasant    ENT:  Oral mucosa moist, EOMI,anicteric sclera. Resp:  CTA bilaterally. No wheezing/rhonchi/rales. No accessory muscle use. CV:  Regular rhythm, normal rate, S1,S2 wnl    GI:  Soft, non distended, non tender. normoactive bowel sounds, no hepatosplenomegaly     Musculoskeletal:  No LE edema  Skin: rt eye lid swelling, ankle swelling, few macules- thigh,hand    Neurologic:  Moves all extremities. AAOx3, CN II-XII reviewed            Data Review:    Review and/or order of clinical lab test  Review and/or order of tests in the radiology section of CPT  Review and/or order of tests in the medicine section of CPT      Labs:     Recent Labs     03/21/22  0357 03/20/22  1124   WBC 6.9 8.6   HGB 11.3* 12.2   HCT 36.0 39.2    284     Recent Labs     03/20/22  1124      K 3.5      CO2 29   BUN 10   CREA 0.80   GLU 96   CA 8.9     Recent Labs     03/20/22  1124   ALT 19   AP 76   TBILI 0.4   TP 7.2   ALB 3.5   GLOB 3.7   LPSE 59*     No results for input(s): INR, PTP, APTT, INREXT in the last 72 hours. No results for input(s): FE, TIBC, PSAT, FERR in the last 72 hours. Lab Results   Component Value Date/Time    Folate 8.3 10/13/2017 02:39 PM      No results for input(s): PH, PCO2, PO2 in the last 72 hours. No results for input(s): CPK, CKNDX, TROIQ in the last 72 hours.     No lab exists for component: CPKMB  Lab Results   Component Value Date/Time    Cholesterol, total 142 03/20/2022 04:31 PM    HDL Cholesterol 55 03/20/2022 04:31 PM    LDL, calculated 66 03/20/2022 04:31 PM    Triglyceride 105 03/20/2022 04:31 PM    CHOL/HDL Ratio 2.6 03/20/2022 04:31 PM     Lab Results   Component Value Date/Time    Glucose (POC) 119 (H) 01/31/2018 11:09 AM    Glucose (POC) 127 (H) 01/31/2018 06:27 AM    Glucose (POC) 170 (H) 01/30/2018 10:07 PM    Glucose (POC) 109 (H) 01/28/2018 09:16 PM     Lab Results   Component Value Date/Time    Color DARK YELLOW 01/28/2018 11:23 AM    Appearance CLEAR 01/28/2018 11:23 AM    Specific gravity 1.018 01/28/2018 11:23 AM    pH (UA) 5.0 01/28/2018 11:23 AM    Protein NEGATIVE  01/28/2018 11:23 AM    Glucose NEGATIVE  01/28/2018 11:23 AM    Ketone NEGATIVE  01/28/2018 11:23 AM    Urobilinogen 0.2 01/28/2018 11:23 AM    Nitrites NEGATIVE  01/28/2018 11:23 AM    Leukocyte Esterase NEGATIVE  01/28/2018 11:23 AM    Epithelial cells FEW 01/28/2018 11:23 AM    Bacteria NEGATIVE  01/28/2018 11:23 AM    WBC 0-4 01/28/2018 11:23 AM    RBC 0-5 01/28/2018 11:23 AM         Medications Reviewed:     Current Facility-Administered Medications   Medication Dose Route Frequency    methylPREDNISolone (MEDROL) tablet 8 mg  8 mg Oral ONCE    Followed by   Robertha Rumps methylPREDNISolone (MEDROL) tablet 4 mg  4 mg Oral ONCE    Followed by   Mary Anne Hernándezps methylPREDNISolone (MEDROL) tablet 4 mg  4 mg Oral ONCE    Followed by   Mary Anne Robin methylPREDNISolone (MEDROL) tablet 8 mg  8 mg Oral ONCE    Followed by   Spencer  ON 3/22/2022] methylPREDNISolone (MEDROL) tablet 4 mg  4 mg Oral TID WITH MEALS    Followed by   Spencer  ON 3/22/2022] methylPREDNISolone (MEDROL) tablet 8 mg  8 mg Oral ONCE    Followed by   Spencer  ON 3/23/2022] methylPREDNISolone (MEDROL) tablet 4 mg  4 mg Oral QID WITH MEALS    Followed by   Spencer  ON 3/24/2022] methylPREDNISolone (MEDROL) tablet 4 mg  4 mg Oral TID    Followed by   Spencer  ON 3/25/2022] methylPREDNISolone (MEDROL) tablet 4 mg  4 mg Oral BID    Followed by   Spencer  ON 3/26/2022] methylPREDNISolone (MEDROL) tablet 4 mg  4 mg Oral ACB    diphenhydrAMINE (BENADRYL) capsule 25 mg  25 mg Oral TID    cetirizine (ZYRTEC) tablet 10 mg  10 mg Oral DAILY    pantoprazole (PROTONIX) tablet 40 mg  40 mg Oral ACB    [Held by provider] enoxaparin (LOVENOX) injection 90 mg  1 mg/kg SubCUTAneous Q12H    acetaminophen (TYLENOL) tablet 650 mg  650 mg Oral Q4H PRN     ______________________________________________________________________  EXPECTED LENGTH OF STAY: - - -  ACTUAL LENGTH OF STAY:          0                 Vernon Khan MD

## 2022-03-21 NOTE — PROGRESS NOTES
TRANSFER - IN REPORT:    Verbal report received from [facility] RN(name) on Devonte Caba  being received from Room 466(unit) for routine progression of care      Report consisted of patients Situation, Background, Assessment and   Recommendations(SBAR). Information from the following report(s) SBAR, Procedure Summary, MAR, Recent Results and Cardiac Rhythm NSR was reviewed with the receiving nurse. Opportunity for questions and clarification was provided. Assessment completed upon patients arrival to unit and care assumed.

## 2022-03-21 NOTE — PROGRESS NOTES
TRANSFER - OUT REPORT:    Verbal report given to [address] [place] [address] on Pleasant Boxer being transferred to Room 466 for routine progression of care       Report consisted of patients Situation, Background, Assessment and   Recommendations(SBAR). Information from the following report(s) SBAR, Procedure Summary, MAR, Recent Results and Cardiac Rhythm NSR was reviewed with the receiving nurse. Opportunity for questions and clarification was provided.

## 2022-03-21 NOTE — PROGRESS NOTES
1930: Bedside and Verbal shift change report given to Radha Hernandez RN (oncoming nurse) by Ramírez Dinh RN  (offgoing nurse). Report included the following information SBAR, Kardex, Intake/Output, Recent Results, and Cardiac Rhythm NSR . Problem: Falls - Risk of  Goal: *Absence of Falls  Description: Document Sullivan Blades Fall Risk and appropriate interventions in the flowsheet.   Outcome: Progressing Towards Goal  Note: Fall Risk Interventions:            Medication Interventions: Teach patient to arise slowly,Patient to call before getting OOB                   Problem: Patient Education: Go to Patient Education Activity  Goal: Patient/Family Education  Outcome: Progressing Towards Goal

## 2022-03-22 ENCOUNTER — APPOINTMENT (OUTPATIENT)
Dept: CT IMAGING | Age: 57
End: 2022-03-22
Attending: HOSPITALIST
Payer: COMMERCIAL

## 2022-03-22 VITALS
WEIGHT: 192.02 LBS | BODY MASS INDEX: 30.86 KG/M2 | OXYGEN SATURATION: 98 % | DIASTOLIC BLOOD PRESSURE: 74 MMHG | SYSTOLIC BLOOD PRESSURE: 127 MMHG | HEIGHT: 66 IN | TEMPERATURE: 98.3 F | HEART RATE: 69 BPM | RESPIRATION RATE: 16 BRPM

## 2022-03-22 LAB
ALBUMIN SERPL-MCNC: 3.3 G/DL (ref 3.5–5)
ANION GAP SERPL CALC-SCNC: 4 MMOL/L (ref 5–15)
ATRIAL RATE: 57 BPM
ATRIAL RATE: 67 BPM
BUN SERPL-MCNC: 11 MG/DL (ref 6–20)
BUN/CREAT SERPL: 18 (ref 12–20)
CALCIUM SERPL-MCNC: 9.1 MG/DL (ref 8.5–10.1)
CALCULATED P AXIS, ECG09: 55 DEGREES
CALCULATED P AXIS, ECG09: 56 DEGREES
CALCULATED R AXIS, ECG10: 42 DEGREES
CALCULATED R AXIS, ECG10: 45 DEGREES
CALCULATED T AXIS, ECG11: 41 DEGREES
CALCULATED T AXIS, ECG11: 48 DEGREES
CHLORIDE SERPL-SCNC: 109 MMOL/L (ref 97–108)
CO2 SERPL-SCNC: 25 MMOL/L (ref 21–32)
CREAT SERPL-MCNC: 0.61 MG/DL (ref 0.55–1.02)
DIAGNOSIS, 93000: NORMAL
DIAGNOSIS, 93000: NORMAL
ERYTHROCYTE [DISTWIDTH] IN BLOOD BY AUTOMATED COUNT: 12.4 % (ref 11.5–14.5)
GLUCOSE SERPL-MCNC: 134 MG/DL (ref 65–100)
HCT VFR BLD AUTO: 39.1 % (ref 35–47)
HGB BLD-MCNC: 12.2 G/DL (ref 11.5–16)
MCH RBC QN AUTO: 29.1 PG (ref 26–34)
MCHC RBC AUTO-ENTMCNC: 31.2 G/DL (ref 30–36.5)
MCV RBC AUTO: 93.3 FL (ref 80–99)
NRBC # BLD: 0 K/UL (ref 0–0.01)
NRBC BLD-RTO: 0 PER 100 WBC
P-R INTERVAL, ECG05: 132 MS
P-R INTERVAL, ECG05: 146 MS
PHOSPHATE SERPL-MCNC: 3.8 MG/DL (ref 2.6–4.7)
PLATELET # BLD AUTO: 313 K/UL (ref 150–400)
PMV BLD AUTO: 10.4 FL (ref 8.9–12.9)
POTASSIUM SERPL-SCNC: 5.1 MMOL/L (ref 3.5–5.1)
Q-T INTERVAL, ECG07: 426 MS
Q-T INTERVAL, ECG07: 456 MS
QRS DURATION, ECG06: 102 MS
QRS DURATION, ECG06: 98 MS
QTC CALCULATION (BEZET), ECG08: 443 MS
QTC CALCULATION (BEZET), ECG08: 450 MS
RBC # BLD AUTO: 4.19 M/UL (ref 3.8–5.2)
SODIUM SERPL-SCNC: 138 MMOL/L (ref 136–145)
VENTRICULAR RATE, ECG03: 57 BPM
VENTRICULAR RATE, ECG03: 67 BPM
WBC # BLD AUTO: 10.3 K/UL (ref 3.6–11)

## 2022-03-22 PROCEDURE — 74011000636 HC RX REV CODE- 636: Performed by: HOSPITALIST

## 2022-03-22 PROCEDURE — 36415 COLL VENOUS BLD VENIPUNCTURE: CPT

## 2022-03-22 PROCEDURE — 74011250637 HC RX REV CODE- 250/637: Performed by: HOSPITALIST

## 2022-03-22 PROCEDURE — 80069 RENAL FUNCTION PANEL: CPT

## 2022-03-22 PROCEDURE — 71275 CT ANGIOGRAPHY CHEST: CPT

## 2022-03-22 PROCEDURE — 74011250636 HC RX REV CODE- 250/636: Performed by: HOSPITALIST

## 2022-03-22 PROCEDURE — G0378 HOSPITAL OBSERVATION PER HR: HCPCS

## 2022-03-22 PROCEDURE — 85027 COMPLETE CBC AUTOMATED: CPT

## 2022-03-22 RX ORDER — METHYLPREDNISOLONE 4 MG/1
TABLET ORAL
Qty: 1 DOSE PACK | Refills: 0 | Status: SHIPPED | OUTPATIENT
Start: 2022-03-22 | End: 2022-06-20 | Stop reason: ALTCHOICE

## 2022-03-22 RX ORDER — PANTOPRAZOLE SODIUM 40 MG/1
40 TABLET, DELAYED RELEASE ORAL
Qty: 14 TABLET | Refills: 0 | Status: SHIPPED | OUTPATIENT
Start: 2022-03-23 | End: 2022-06-02 | Stop reason: SDUPTHER

## 2022-03-22 RX ADMIN — METHYLPREDNISOLONE 4 MG: 4 TABLET ORAL at 13:16

## 2022-03-22 RX ADMIN — CETIRIZINE HYDROCHLORIDE 10 MG: 10 TABLET, FILM COATED ORAL at 08:57

## 2022-03-22 RX ADMIN — METHYLPREDNISOLONE 4 MG: 4 TABLET ORAL at 06:55

## 2022-03-22 RX ADMIN — SODIUM CHLORIDE 75 ML/HR: 900 INJECTION, SOLUTION INTRAVENOUS at 00:00

## 2022-03-22 RX ADMIN — IOPAMIDOL 80 ML: 755 INJECTION, SOLUTION INTRAVENOUS at 12:05

## 2022-03-22 RX ADMIN — PANTOPRAZOLE SODIUM 40 MG: 40 TABLET, DELAYED RELEASE ORAL at 06:55

## 2022-03-22 RX ADMIN — DIPHENHYDRAMINE HCL 25 MG: 25 CAPSULE ORAL at 08:57

## 2022-03-22 NOTE — PROGRESS NOTES
Problem: Falls - Risk of  Goal: *Absence of Falls  Description: Document Gordon Delgado Fall Risk and appropriate interventions in the flowsheet.   Outcome: Progressing Towards Goal  Note: Fall Risk Interventions:            Medication Interventions: Teach patient to arise slowly

## 2022-03-22 NOTE — PROGRESS NOTES
JOCELIN PLAN:  RUR-Obs  Disposition-Home with   Transportation-by   F/U with PCP/Specialist    Medicare Outpatient Observation Notice (MOON)/ [place] Outpatient Observation Notice (James Finley) provided to patient/representative with verbal explanation of the notice. Time allotted for questions regarding the notice. Patient /representative provided a completed copy of the MOON/VOON notice. Copy placed on bedside chart. Care Management Interventions  PCP Verified by CM: Yes  Palliative Care Criteria Met (RRAT>21 & CHF Dx)?: No  Mode of Transport at Discharge:  Other (see comment) (Private car)  Transition of Care Consult (CM Consult): Discharge Planning  MyChart Signup: No  Discharge Durable Medical Equipment: No  Health Maintenance Reviewed: Yes  Physical Therapy Consult: No  Occupational Therapy Consult: No  Speech Therapy Consult: No  Support Systems: Spouse/Significant Other  Confirm Follow Up Transport: Family  Discharge Location  Patient Expects to be Discharged to[de-identified] Home with family assistance

## 2022-03-22 NOTE — PROGRESS NOTES
Problem: Falls - Risk of  Goal: *Absence of Falls  Description: Document Ashly Counts Fall Risk and appropriate interventions in the flowsheet.   3/22/2022 1458 by Abi Talley RN  Outcome: Resolved/Met  Note: Fall Risk Interventions:            Medication Interventions: Teach patient to arise slowly                3/22/2022 0906 by Abi Talley RN  Outcome: Progressing Towards Goal  Note: Fall Risk Interventions:            Medication Interventions: Teach patient to arise slowly

## 2022-03-22 NOTE — PROGRESS NOTES
2330: Bedside and Verbal shift change report given to Honey Fletcher RN (oncoming nurse) by Aguilar Yousif RN (offgoing nurse). Report included the following information SBAR, Kardex, Intake/Output, MAR, Accordion, Recent Results, Med Rec Status and Cardiac Rhythm SR. No significant changes overnight    0800: Bedside and Verbal shift change report given to Lana Acosta RN (oncoming nurse) by Honey Fletcher RN (off going nurse).  Report included the following information SBAR, Kardex, Intake/Output, MAR, Accordion, Recent Results, Med Rec Status and Cardiac Rhythm SR.

## 2022-03-22 NOTE — PROGRESS NOTES
Cardiology Progress Note  3/22/2022    Admit Date: 3/20/2022  Admit Diagnosis: Chest pain [R07.9]  CC:  Chest pain    Assessment/Plan:  1. 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 No obstructive CAD noted. This is usually a benign variant and unclear if mild troponin elevation is related to this. ECG without ischemic changes. CTA chest without pulmonary embolism. This was a non-gated study so coronary evaluation was limited but it appears to be a retro-aortic course which is usually benign.  - Ok for discharge. Will arrange cardiac CTA for further evaluation of anomalous LCx course  - Rash resolved with stopping aspirin and coreg  2. H/o diabetes - well controlled off meds after gastric sleeve  3. S/p gastric sleeve    Thank you for this consult. We will sign off. Please contact us for any further questions or concerns. Hospital problem list     Active Problems:    Chest pain (3/20/2022)                                                          Subjective:     No chest pain. CTA without pulmonary embolism. Rash has resolved. ROS: All other systems reviewed and were negative other than mentioned above. No change in family and social history from H&P/Consult note.     Objective:    Physical Exam:  24 hr VS reviewed, overall VSSAF  Temp (24hrs), Av.1 °F (36.7 °C), Min:97.5 °F (36.4 °C), Max:98.5 °F (36.9 °C)    Patient Vitals for the past 8 hrs:   Pulse   22 1400 69   22 1221 79   22 1000 74   22 0854 67    Patient Vitals for the past 8 hrs:   Resp   22 1221 16   22 0854 16    Patient Vitals for the past 8 hrs:   BP   22 1221 127/74   22 0854 136/64          Intake/Output Summary (Last 24 hours) at 3/22/2022 1432  Last data filed at 3/21/2022 2000  Gross per 24 hour   Intake 780 ml   Output 0 ml   Net 780 ml       General: resting comfortably in no distress  HEENT: sclera anicteric, moist mucous membranes  Neck: supple, no JVD or HJR  CV: regular rate and rhythm, normal S1 and S2, no murmurs, rubs or gallops, 2+ radial pulses bilaterally  Lungs: no respiratory distress, lungs clear to auscultation without wheezing or rales  Abdomen:  soft, non distended, non tender  MSK: no lower extremity edema  Skin: rash resolved  Neuro: alert and oriented, answered questions appropriately  Psych: normal mood and affect    Data Review:  Lab results reviewed as noted below. Current medications reviewed as noted below. Telemetry independently reviewed : [x]  sinus    []  chronic afib     []  par afib    []  NSVT    ECG independently reviewed: []  NSR    []  no significant changes   [x]  no new ECG provided for review    No results for input(s): PH, PCO2, PO2 in the last 72 hours. No results for input(s): CPK, CKMB in the last 72 hours. No lab exists for component: TROPONINI  Recent Labs     03/22/22  0444 03/21/22  0357 03/20/22  1124     --  144   K 5.1  --  3.5   *  --  107   CO2 25  --  29   BUN 11  --  10   CREA 0.61  --  0.80   *  --  96   PHOS 3.8  --   --    CA 9.1  --  8.9   ALB 3.3*  --  3.5   WBC 10.3 6.9 8.6   HGB 12.2 11.3* 12.2   HCT 39.1 36.0 39.2    262 284     Recent Labs     03/22/22  0444 03/20/22  1124   AP  --  76   TBILI  --  0.4   TP  --  7.2   ALB 3.3* 3.5   GLOB  --  3.7   LPSE  --  59*     No results for input(s): INR, PTP, APTT, INREXT, INREXT in the last 72 hours. No results for input(s): FE, TIBC, PSAT, FERR in the last 72 hours.    Lab Results   Component Value Date/Time    Glucose (POC) 119 (H) 01/31/2018 11:09 AM    Glucose (POC) 127 (H) 01/31/2018 06:27 AM    Glucose (POC) 170 (H) 01/30/2018 10:07 PM    Glucose (POC) 109 (H) 01/28/2018 09:16 PM       Current Facility-Administered Medications   Medication Dose Route Frequency    methylPREDNISolone (MEDROL) tablet 4 mg  4 mg Oral TID WITH MEALS    Followed by   [facility] methylPREDNISolone (MEDROL) tablet 8 mg  8 mg Oral ONCE    Followed by   Luzma Portal ON 3/23/2022] methylPREDNISolone (MEDROL) tablet 4 mg  4 mg Oral QID WITH MEALS    Followed by   Luzma Portal ON 3/24/2022] methylPREDNISolone (MEDROL) tablet 4 mg  4 mg Oral TID    Followed by   Luzma Portal ON 3/25/2022] methylPREDNISolone (MEDROL) tablet 4 mg  4 mg Oral BID    Followed by   Luzma Portal ON 3/26/2022] methylPREDNISolone (MEDROL) tablet 4 mg  4 mg Oral ACB    diphenhydrAMINE (BENADRYL) capsule 25 mg  25 mg Oral TID    cetirizine (ZYRTEC) tablet 10 mg  10 mg Oral DAILY    0.9% sodium chloride infusion  75 mL/hr IntraVENous CONTINUOUS    pantoprazole (PROTONIX) tablet 40 mg  40 mg Oral ACB    [Held by provider] enoxaparin (LOVENOX) injection 90 mg  1 mg/kg SubCUTAneous Q12H    acetaminophen (TYLENOL) tablet 650 mg  650 mg Oral Q4H PRN         Omi Travis, [facility]  03/22/22

## 2022-03-22 NOTE — PROGRESS NOTES
1930: Bedside and Verbal shift change report given to AMERICA Duke  (oncoming nurse) by Marciano Gould RN  (offgoing nurse). Report included the following information SBAR, Kardex, Procedure Summary, Intake/Output, Recent Results, and Cardiac Rhythm NSR .    2030: verified med order for medrol with pharmacy. 2330: Bedside and Verbal shift change report given to Ekta Parisi RN  (oncoming nurse) by AMERICA Duke (offgoing nurse). Report included the following information SBAR, Kardex, Procedure Summary, Intake/Output, MAR, Recent Results, and Cardiac Rhythm NSR . Problem: Falls - Risk of  Goal: *Absence of Falls  Description: Document Stonewall Fail Fall Risk and appropriate interventions in the flowsheet.   Outcome: Progressing Towards Goal  Note: Fall Risk Interventions:            Medication Interventions: Teach patient to arise slowly,Patient to call before getting OOB                   Problem: Patient Education: Go to Patient Education Activity  Goal: Patient/Family Education  Outcome: Progressing Towards Goal

## 2022-03-22 NOTE — PROGRESS NOTES
Bedside shift change report given to Alonzo Dodd  (oncoming nurse) by Darcie David (offgoing nurse). Report included the following information SBAR, Kardex, Intake/Output, MAR and Recent Results. Patient returned from Cath. Right radial site intact, no bleeding or swelling. Cooperative with plan of care. Continue to monitor per MD order.

## 2022-03-22 NOTE — PROGRESS NOTES
Hospital follow-up PCP transitional care appointment has been scheduled with Dr. Brayan Santos for Monday, 3/28/22 at 9:00 a.m. Pending patient discharge. Wil Jimenez, Care Management Assistant.

## 2022-03-23 ENCOUNTER — PATIENT OUTREACH (OUTPATIENT)
Dept: CASE MANAGEMENT | Age: 57
End: 2022-03-23

## 2022-03-23 NOTE — PROGRESS NOTES
Care Transitions Initial Call    Call within 2 business days of discharge: Yes     Patient: Devonte Caba Patient : 1965 MRN: [id number]    [facility]       Complaint Diagnosis Description Type Department Provider    3/20/22 Chest Pain NSTEMI (non-ST elevated myocardial infarction) ([facility] [address] . .. ED to Hosp-Admission (Discharged) (ADMIT) Apoorva Abraham MD; Dajuan Henson. .. 3/20/22 Abdominal Pain; Chest Pain NSTEMI (non-ST elevated myocardial infarction) [facility]) ED (TRANSFER) WTARTURO Chivo NinoDO        3/23/2022  Initial outreach attempt unsuccessful, Audrey    3/24/2022  Contacted the patient    Was this an external facility discharge? No    Challenges to be reviewed by the provider   Additional needs identified to be addressed with provider:     Method of communication with provider : chart routing    Discussed COVID-19 related testing which was not done at this time. Advance Care Planning:   Does patient have an Advance Directive:  currently not on file; education provided     Inpatient Readmission Risk score: No data recorded  Was this a readmission? no   Patient stated reason for the admission: chest pain    Patients top risk factors for readmission: medical condition-chest pain and medication management   Interventions to address risk factors: Scheduled appointment with PCP-3/28, Scheduled appointment with Specialist- and Obtained and reviewed discharge summary and/or continuity of care documents    Care Transition Nurse (CTN) contacted the patient by telephone to perform post hospital discharge assessment. Verified name and  with patient as identifiers. Provided introduction to self, and explanation of the CTN role. Reports feeling ok, endorses fatigue. Denies chest pain, sob, fever, edema. States some indigestion but is taking Protonix. R wrist tender, no evidence of infection. Reports some bruising to L arm where IV was. Tolerating PO.  No bladder concerns. No BM since discharge, but not unusual.    CTN reviewed discharge instructions, medical action plan and red flags with patient who verbalized understanding. Were discharge instructions available to patient? yes. Reviewed appropriate site of care based on symptoms and resources available to patient including: PCP and Specialist. Patient given an opportunity to ask questions and does not have any further questions or concerns at this time. The patient agrees to contact the PCP office for questions related to their healthcare. Medication reconciliation was performed with patient, who verbalizes understanding of administration of home medications. Referral to Pharm D needed: no     Home Health/Outpatient orders at discharge: none    Durable Medical Equipment ordered at discharge: None    Covid Risk Education    Educated patient about risk for severe COVID-19 due to risk factors according to CDC guidelines. CTN reviewed discharge instructions, medical action plan and red flag symptoms with the patient who verbalized understanding. Discussed COVID vaccination status: yes. Education provided on COVID-19 vaccination as appropriate. Discussed exposure protocols and quarantine with CDC Guidelines. Patient was given an opportunity to verbalize any questions and concerns and agrees to contact CTN or health care provider for questions related to their healthcare. Was patient discharged with a pulse oximeter? no.     Discussed follow-up appointments. If no appointment was previously scheduled, appointment scheduling offered: no. Is follow up appointment scheduled within 7 days of discharge? yes.    [address] Dorita Sanches follow up appointment(s):   Future Appointments   Date Time Provider Cecilia Stark   3/28/2022  9:00 AM Jane Kincaid MD [facility] BS [facility]     Non-BS follow up appointment(s): 4/14 Heart CTA; 4/19 Dr. Diego Villalobos, heath    Plan for follow-up call in 7-10 days based on severity of symptoms and risk factors. Plan for next call: self management-chest pain and follow up appointment-pcp  CTN provided contact information for future needs. Goals Addressed                 This Visit's Progress     Prevent complications post hospitalization.           03/24/22   Will complete steroid therapy   Will attend follow up appt with PCP scheduled 3/28 and Dr. Clay Parks 4/19   Will monitor for chest pain   Pt will remain out of the hospital or ER for remainder of JOCELIN period

## 2022-03-24 PROBLEM — R07.9 CHEST PAIN: Status: ACTIVE | Noted: 2022-03-20

## 2022-03-24 NOTE — DISCHARGE SUMMARY
Discharge Summary       PATIENT ID: Kurtis Petersen  MRN: [id number]   YOB: 1965    DATE OF ADMISSION: 3/20/2022  2:18 PM    DATE OF DISCHARGE: 3/22/2022  PRIMARY CARE PROVIDER: MD Magaly Massey MD    DISCHARGING PROVIDER: Kendy Bradford MD    To contact this individual call [phone] and ask the  to page. If unavailable ask to be transferred the Adult Hospitalist Department. CONSULTATIONS: IP CONSULT TO CARDIOLOGY    PROCEDURES/SURGERIES: Procedure(s):  LEFT HEART CATH / CORONARY ANGIOGRAPHY    DISCHARGE DIAGNOSES:   ADMISSION SUMMARY AND HOSPITAL COURSE:       DISCHARGE DIAGNOSES / PLAN:    64 y.o. female with past medical history of gastric sleeve came to the hospital with a chief complaint of epigastric and chest pain. 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 Chest pain, shortness of breath, fever, chills, nausea, vomiting, diarrhea, change in mentation, falling, weakness, bleeding. Severe pain or pain not relieved by medications, as well as any other signs or symptoms that you may have questions about.     FOLLOW UP APPOINTMENTS:    Follow-up Information     Follow up With Specialties Details Why Contact Info    Hayde Pascal MD Family Medicine, Bariatrics On 3/28/2022 Hospital follow up Virtual primary care appointment Monday, 3/28/22 at 9:00 a.m.  [address] [facility] [address]      Lefty Gonzalez MD Cardio Vascular Surgery, Cardiology In 2 weeks  [address]   [place] [address] [address]  [phone]               ADDITIONAL CARE RECOMMENDATIONS: follow up with PCP,cardiologist  You will need a Cardiac CT scan    DIET: Regular Diet      ACTIVITY: Activity as tolerated    WOUND CARE: na    EQUIPMENT needed: na    DISCHARGE MEDICATIONS:  Discharge Medication List as of 3/22/2022  2:34 PM      START taking these medications    Details   methylPREDNISolone (Medrol, Catalino,) 4 mg tablet As per instructions on the pack, Print, Disp-1 Dose Pack, R-0      pantoprazole (PROTONIX) 40 mg tablet Take 1 Tablet by mouth Daily (before breakfast). , Print, Disp-14 Tablet, R-0         CONTINUE these medications which have CHANGED    Details   rifAXIMin (XIFAXAN) 550 mg tablet Take 1 Tablet by mouth three (3) times daily (with meals) for 14 days. For 14 days. Start date: 3/7/22. End date: 3/21/22., No Print, Disp-42 Tablet, R-0         CONTINUE these medications which have NOT CHANGED    Details   semaglutide, weight loss, 2.4 mg/0.75 mL pnij 2.4 mg by SubCUTAneous route every seven (7) days. , Historical Med      buPROPion SR (WELLBUTRIN SR) 100 mg SR tablet Take 100 mg po qam prn., Historical Med      cholecalciferol, vitamin D3, (Vitamin D3) 50 mcg (2,000 unit) tab Take 100 mcg by mouth every evening., Historical Med      hydrOXYzine HCL (ATARAX) 10 mg tablet Take 10 mg by mouth nightly as needed., Historical Med      OTHER,NON-FORMULARY, Compounded Estradiol/Estriol/Progesterone 1 cap po qhs, Historical Med      cetirizine (ZyrTEC) 10 mg tablet Take 10 mg by mouth daily as needed for Allergies. , Historical Med      iodine (Kelp, iodine,) 150 mcg tab Take 150 mcg po twice a week., Historical Med      multivitamin (ONE A DAY) tablet Take 1 Tab by mouth daily. , Historical Med             DISPOSITION:    Home With:   OT  PT  HH  RN       Long term SNF/Inpatient Rehab    Independent/assisted living    Hospice    Other:       PATIENT CONDITION AT DISCHARGE:     Functional status    Poor     Deconditioned     Independent      Cognition     Lucid     Forgetful     Dementia      Catheters/lines (plus indication)    Klein     PICC     PEG     None      Code status     Full code     DNR      PHYSICAL EXAMINATION AT DISCHARGE:  General:          Alert, cooperative, no distress, appears stated age. HEENT:           Atraumatic, anicteric sclerae, pink conjunctivae                          No oral ulcers, mucosa moist, throat clear, dentition fair  Neck:               Supple, symmetrical  Lungs:             Clear to auscultation bilaterally. No Wheezing or Rhonchi. No rales. Heart:              Regular  rhythm,  No  murmur   No edema  Abdomen:        Soft, non-tender. Not distended. Bowel sounds normal  Extremities:     No LE edema  Skin:                Not pale. Not Jaundiced  No rashes   Psych:             Not anxious or agitated. Neurologic:      Alert, moves all extremities, answers questions appropriately and responds to commands       CHRONIC MEDICAL DIAGNOSES:  Problem List as of 3/22/2022 Date Reviewed: 5/28/2019          Codes Class Noted - Resolved    Biliary calculus with obstruction without cholecystitis ICD-10-CM: K80.71  ICD-9-CM: 574.91  1/28/2018 - Present        Controlled type 2 diabetes mellitus without complication, without long-term current use of insulin ([facility] ICD-10-CM: E11.9  ICD-9-CM: 250.00  11/16/2016 - Present        BREE (obstructive sleep apnea) ICD-10-CM: Q07.49  ICD-9-CM: 327.23  10/19/2016 - Present        Diabetes mellitus type 2, controlled ([facility] ICD-10-CM: E11.9  ICD-9-CM: 250.00  9/9/2016 - Present        Obesity (BMI 30-39. 9) ICD-10-CM: E66.9  ICD-9-CM: 278.00  8/21/2016 - Present        Prediabetes ICD-10-CM: R73.03  ICD-9-CM: 790.29  8/21/2016 - Present        New daily persistent headache ICD-10-CM: G44.52  ICD-9-CM: 339.42  8/21/2016 - Present        GERD (gastroesophageal reflux disease) ICD-10-CM: K21.9  ICD-9-CM: 530.81  9/28/2009 - Present        Chronic back pain ICD-10-CM: M54.9, G89.29  ICD-9-CM: 724.5, 338.29  9/28/2009 - Present        Overweight(278.02) ICD-10-CM: E66.3  ICD-9-CM: 278.02  9/28/2009 - Present        Fatigue ICD-10-CM: R53.83  ICD-9-CM: 780.79  9/28/2009 - Present        Chest pain ICD-10-CM: R07.9  ICD-9-CM: 786.50  3/20/2022 - Present              40 minutes were spent with the patient on counseling and coordination of care    Signed:   Sheridan Stahl MD  3/24/2022  8:21 AM    Cc: Debbie Myers MD

## 2022-03-26 NOTE — DISCHARGE INSTRUCTIONS
Discharge Instructions       PATIENT ID: Pleasant Boxer  MRN: [id number]   YOB: 1965    DATE OF ADMISSION: 3/20/2022  2:18 PM    DATE OF DISCHARGE: 3/22/2022    PRIMARY CARE PROVIDER: Hayde Pascal MD     ATTENDING PHYSICIAN: Saurabh Flor MD  DISCHARGING PROVIDER: Jyoti Harris MD    To contact this individual call [phone] and ask the  to page. If unavailable ask to be transferred the Adult Hospitalist Department. DISCHARGE DIAGNOSES Atypical chest pain,    CONSULTATIONS: IP CONSULT TO CARDIOLOGY    PROCEDURES/SURGERIES: Procedure(s):  LEFT HEART CATH / CORONARY ANGIOGRAPHY    PENDING TEST RESULTS:   At the time of discharge the following test results are still pending: none    FOLLOW UP APPOINTMENTS:   Follow-up Information     Follow up With Specialties Details Why Contact Info    Hayde Pascal MD Family Medicine, Bariatrics On 3/28/2022 Hospital follow up Virtual primary care appointment Monday, 3/28/22 at 9:00 a.m.  [address] [facility]      Lefty Gonzalez MD Cardio Vascular Surgery, Cardiology In 2 weeks  [address] [place] [address]  [phone]             ADDITIONAL CARE RECOMMENDATIONS: follow up with PCP,cardiologist  You will need a Cardiac CT scan    DIET: Regular Diet      ACTIVITY: Activity as tolerated    WOUND CARE: na    EQUIPMENT needed: na      Radiology:  XR CHEST PA LAT    Result Date: 3/20/2022  No acute process. CTA CHEST W OR W WO CONT    Result Date: 3/22/2022  No acute vascular abnormality, no pulmonary embolism. No pneumonia. 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    · Keep your medication in the bottles provided by the pharmacist and keep a list of the medication names, dosages, and times to be taken in your wallet. · Do not take other medications without consulting your doctor. NOTIFY YOUR PHYSICIAN FOR ANY OF THE FOLLOWING:   Fever over 101 degrees for 24 hours. Chest pain, shortness of breath, fever, chills, nausea, vomiting, diarrhea, change in mentation, falling, weakness, bleeding. Severe pain or pain not relieved by medications. Or, any other signs or symptoms that you may have questions about.       DISPOSITION:  x  Home With:   OT  PT  [facility]  RN       SNF/Inpatient Rehab/LTAC    Independent/assisted living    Hospice    Other:         Signed:   Devonte Smith MD  3/22/2022  2:30 PM'

## 2022-03-28 ENCOUNTER — VIRTUAL VISIT (OUTPATIENT)
Dept: FAMILY MEDICINE CLINIC | Age: 57
End: 2022-03-28
Payer: COMMERCIAL

## 2022-03-28 DIAGNOSIS — Z09 HOSPITAL DISCHARGE FOLLOW-UP: ICD-10-CM

## 2022-03-28 DIAGNOSIS — R07.9 CHEST PAIN, UNSPECIFIED TYPE: Primary | ICD-10-CM

## 2022-03-28 DIAGNOSIS — E11.9 CONTROLLED TYPE 2 DIABETES MELLITUS WITHOUT COMPLICATION, WITHOUT LONG-TERM CURRENT USE OF INSULIN (HCC): ICD-10-CM

## 2022-03-28 DIAGNOSIS — G47.33 OSA (OBSTRUCTIVE SLEEP APNEA): ICD-10-CM

## 2022-03-28 PROCEDURE — 99214 OFFICE O/P EST MOD 30 MIN: CPT | Performed by: FAMILY MEDICINE

## 2022-03-28 NOTE — PROGRESS NOTES
Wai Myles is a 64 y.o. female who was seen by synchronous (real-time) audio-video technology on 3/28/2022 for Hospital Follow Up (Heart Attack)  was admitted Sunday march 20    discharged Tuesday march 22 left heart cath was negative  She has a CTA scheduled   Had chest pain again yesterday and went to ER at [facility]  The troponin was negative there  The ER doc said they do not think the troponin could have  Been  99 a week ago and neg today    She does wake up unrefreshed  Does not wake with headache  Has h/o bree but after lost weight post sleeve she stopped using it. Never was told the BREE was resolved        Assessment & Plan:   Diagnoses and all orders for this visit:    1. Chest pain, unspecified type  Not clear yet if this is CAD  I wonder if the BREE caused infarct  Might have been a rhythm issue while sleep  Still has more cardiac testing   she has follow up with cardiologist  2. Hospital discharge follow up  She was called by transitional care nurse 1 days after discharge  3. BREE (obstructive sleep apnea)  -     SLEEP MEDICINE REFERRAL  She needs test to see if she still has BREE  4. Controlled type 2 diabetes mellitus without complication, without long-term current use of insulin (HCC)  eeds a1c rechecked. The last one was in prediabetes range  prescribed ozempic for weight loss which helps blood sugar       Subjective:       Prior to Admission medications    Medication Sig Start Date End Date Taking? Authorizing Provider   rifAXIMin (XIFAXAN) 550 mg tablet Take 1 Tablet by mouth three (3) times daily (with meals) for 14 days. For 14 days. Start date: 3/7/22. End date: 3/21/22. 3/22/22 4/5/22 Yes Roro Moreno MD   pantoprazole (PROTONIX) 40 mg tablet Take 1 Tablet by mouth Daily (before breakfast). 3/23/22  Yes Roro Moreno MD   semaglutide, weight loss, 2.4 mg/0.75 mL pnij 2.4 mg by SubCUTAneous route every seven (7) days.    Yes Provider, Historical   buPROPion SR (WELLBUTRIN SR) 100 mg SR tablet Take 100 mg po qam prn. Yes Provider, Historical   cholecalciferol, vitamin D3, (Vitamin D3) 50 mcg (2,000 unit) tab Take 100 mcg by mouth every evening. Yes Provider, Historical   hydrOXYzine HCL (ATARAX) 10 mg tablet Take 10 mg by mouth nightly as needed. Yes Provider, Historical   OTHER,NON-FORMULARY, Compounded Estradiol/Estriol/Progesterone 1 cap po qhs   Yes Provider, Historical   cetirizine (ZyrTEC) 10 mg tablet Take 10 mg by mouth daily as needed for Allergies. Yes Provider, Historical   iodine (Kelp, iodine,) 150 mcg tab Take 150 mcg po twice a week. Yes Provider, Historical   multivitamin (ONE A DAY) tablet Take 1 Tab by mouth daily. Yes Provider, Historical   methylPREDNISolone (Medrol, Catalino,) 4 mg tablet As per instructions on the pack  Patient not taking: Reported on 3/28/2022 3/22/22   Odilia Wagner MD     Patient Active Problem List    Diagnosis Date Noted    Chest pain 03/20/2022    Biliary calculus with obstruction without cholecystitis 01/28/2018    Controlled type 2 diabetes mellitus without complication, without long-term current use of insulin ([facility] 11/16/2016    BREE (obstructive sleep apnea) 10/19/2016    Diabetes mellitus type 2, controlled ([facility] 09/09/2016    Obesity (BMI 30-39.9) 08/21/2016    Prediabetes 08/21/2016    New daily persistent headache 08/21/2016    GERD (gastroesophageal reflux disease) 09/28/2009    Chronic back pain 09/28/2009    Overweight(278.02) 09/28/2009    Fatigue 09/28/2009     Current Outpatient Medications   Medication Sig Dispense Refill    rifAXIMin (XIFAXAN) 550 mg tablet Take 1 Tablet by mouth three (3) times daily (with meals) for 14 days. For 14 days. Start date: 3/7/22. End date: 3/21/22. 42 Tablet 0    pantoprazole (PROTONIX) 40 mg tablet Take 1 Tablet by mouth Daily (before breakfast). 14 Tablet 0    semaglutide, weight loss, 2.4 mg/0.75 mL pnij 2.4 mg by SubCUTAneous route every seven (7) days.       buPROPion SR [facility] SR) 100 mg SR tablet Take 100 mg po qam prn.  cholecalciferol, vitamin D3, (Vitamin D3) 50 mcg (2,000 unit) tab Take 100 mcg by mouth every evening.  hydrOXYzine HCL (ATARAX) 10 mg tablet Take 10 mg by mouth nightly as needed.  OTHER,NON-FORMULARY, Compounded Estradiol/Estriol/Progesterone 1 cap po qhs      cetirizine (ZyrTEC) 10 mg tablet Take 10 mg by mouth daily as needed for Allergies.  iodine (Kelp, iodine,) 150 mcg tab Take 150 mcg po twice a week.  multivitamin (ONE A DAY) tablet Take 1 Tab by mouth daily.       methylPREDNISolone (Medrol, Catalino,) 4 mg tablet As per instructions on the pack (Patient not taking: Reported on 3/28/2022) 1 Dose Pack 0       ROS    Objective:     Patient-Reported Vitals 3/28/2022   Patient-Reported Weight 185lb   Patient-Reported Height -        [INSTRUCTIONS:  \"[x]\" Indicates a positive item  \"[]\" Indicates a negative item  -- DELETE ALL ITEMS NOT EXAMINED]    Constitutional: [x] Appears well-developed and well-nourished [x] No apparent distress      [] Abnormal -     Mental status: [x] Alert and awake  [x] Oriented to person/place/time [x] Able to follow commands    [] Abnormal -     Eyes:   EOM    [x]  Normal    [] Abnormal -   Sclera  [x]  Normal    [] Abnormal -          Discharge [x]  None visible   [] Abnormal -     HENT: [x] Normocephalic, atraumatic  [] Abnormal -   [x] Mouth/Throat: Mucous membranes are moist    External Ears [x] Normal  [] Abnormal -    Neck: [x] No visualized mass [] Abnormal -     Pulmonary/Chest: [x] Respiratory effort normal   [x] No visualized signs of difficulty breathing or respiratory distress        [] Abnormal -      Musculoskeletal:   [x] Normal gait with no signs of ataxia         [x] Normal range of motion of neck        [] Abnormal -     Neurological:        [x] No Facial Asymmetry (Cranial nerve 7 motor function) (limited exam due to video visit)          [x] No gaze palsy        [] Abnormal - Skin:        [x] No significant exanthematous lesions or discoloration noted on facial skin         [] Abnormal -            Psychiatric:       [x] Normal Affect [] Abnormal -        [x] No Hallucinations    Other pertinent observable physical exam findings:-        We discussed the expected course, resolution and complications of the diagnosis(es) in detail. Medication risks, benefits, costs, interactions, and alternatives were discussed as indicated. I advised her to contact the office if her condition worsens, changes or fails to improve as anticipated. She expressed understanding with the diagnosis(es) and plan. Anton Gutierrez, was evaluated through a synchronous (real-time) audio-video encounter. The patient (or guardian if applicable) is aware that this is a billable service, which includes applicable co-pays. Verbal consent to proceed has been obtained. The visit was conducted pursuant to the emergency declaration under the [facility], [address] [place] [address] authority and the [employer] and [employer] General Act. Patient identification was verified, and a caregiver was present when appropriate. The patient was located at home in a state where the provider was licensed to provide care.       Candy Scales MD

## 2022-03-28 NOTE — PROGRESS NOTES
1. Have you been to the ER, urgent care clinic since your last visit? Hospitalized since your last visit? Yes When: 3/20/2022-3/22/2022 Where: [facility] Reason for visit: Heart Attack    2. Have you seen or consulted any other health care providers outside of the [address] [place] [address] since your last visit? Include any pap smears or colon screening. No     Chief Complaint   Patient presents with   [facility] Follow Up     Heart Attack     Health Maintenance Due   Topic Date Due    Foot Exam Q1  Never done    Cervical cancer screen  Never done    Colorectal Cancer Screening Combo  Never done    Shingrix Vaccine Age 50> (1 of 2) Never done    MICROALBUMIN Q1  03/17/2018    Eye Exam Retinal or Dilated  04/17/2020    COVID-19 Vaccine (3 - Booster for Moderna series) 08/31/2021    Breast Cancer Screen Mammogram  03/12/2022     3 most recent PHQ Screens 3/28/2022   Little interest or pleasure in doing things Not at all   Feeling down, depressed, irritable, or hopeless Not at all   Total Score PHQ 2 0     Abuse Screening Questionnaire 3/28/2022   Do you ever feel afraid of your partner? N   Are you in a relationship with someone who physically or mentally threatens you? N   Is it safe for you to go home?  Y     Learning Assessment 3/24/2021   PRIMARY LEARNER Patient   HIGHEST LEVEL OF EDUCATION - PRIMARY LEARNER  > 4 YEARS OF COLLEGE   BARRIERS PRIMARY LEARNER NONE   CO-LEARNER CAREGIVER -   PRIMARY LANGUAGE ENGLISH    NEED -   LEARNER PREFERENCE PRIMARY LISTENING   LEARNING SPECIAL TOPICS -   ANSWERED BY patient   RELATIONSHIP SELF     Patient-Reported Vitals 3/28/2022   Patient-Reported Weight 185lb   Patient-Reported Height -

## 2022-03-31 ENCOUNTER — PATIENT OUTREACH (OUTPATIENT)
Dept: CASE MANAGEMENT | Age: 57
End: 2022-03-31

## 2022-04-14 ENCOUNTER — PATIENT OUTREACH (OUTPATIENT)
Dept: CASE MANAGEMENT | Age: 57
End: 2022-04-14

## 2022-04-22 ENCOUNTER — PATIENT OUTREACH (OUTPATIENT)
Dept: CASE MANAGEMENT | Age: 57
End: 2022-04-22

## 2022-04-22 NOTE — PROGRESS NOTES
Patient has graduated from the Transitions of Care Coordination  program on 4/22/2022. Patient/family has the ability to self-manage at this time Care management goals have been completed. Patient was not referred to the [facility] team for further management. Goals Addressed                 This Visit's Progress     COMPLETED: Prevent complications post hospitalization. 03/24/22   Will complete steroid therapy   Will attend follow up appt with PCP scheduled 3/28 and Dr. Zay Bedolla 4/19   Will monitor for chest pain   Pt will remain out of the hospital or ER for remainder of JOCELIN period            Patient has Care Transition Nurse's contact information for any further questions, concerns, or needs. Patients upcoming visits:  No future appointments.

## 2022-04-25 ENCOUNTER — DOCUMENTATION ONLY (OUTPATIENT)
Dept: SURGERY | Age: 57
End: 2022-04-25

## 2022-04-26 NOTE — PROGRESS NOTES
Telephone encounter last week Wednesday and again today  She is calling because her chest pain continues   She had the heart CT and nothing was found-she reports. Initially she presented with chest pain and elevated enzymes to the ER  A second ER visit revealed neg enzymes  Not sure if this is coronary spasm. This is still under eval by cardiology   she needs form for FMLA completed. She tried to go back to work and had another chest pain episode.  She did not go to ER   I am giving her 2 more weeks off , within this time she has a cardiology visit  Future time off will be determined after that

## 2022-05-03 ENCOUNTER — DOCUMENTATION ONLY (OUTPATIENT)
Dept: SURGERY | Age: 57
End: 2022-05-03

## 2022-05-03 NOTE — PROGRESS NOTES
Telephone call  Patient called with information that she saw cardiology at [address] [place] [address] and he feels there is something going on that requires more investigation  He plans an MRI of her heart under stress  She will then go back to see him  He has not put any restrictons on her as far as work but she still has [employer] going up the stairs in her home for example  I suggest not exercising until this eval is completed  Recheck after she sees cardiology at the end of this month  I am completing her return to work papers for may 9th  I will determine need for any other time out after her next cardiology appt

## 2022-05-20 ENCOUNTER — TELEPHONE (OUTPATIENT)
Dept: FAMILY MEDICINE CLINIC | Age: 57
End: 2022-05-20

## 2022-05-26 ENCOUNTER — TELEPHONE (OUTPATIENT)
Dept: FAMILY MEDICINE CLINIC | Age: 57
End: 2022-05-26

## 2022-05-27 ENCOUNTER — VIRTUAL VISIT (OUTPATIENT)
Dept: FAMILY MEDICINE CLINIC | Age: 57
End: 2022-05-27
Payer: COMMERCIAL

## 2022-05-27 ENCOUNTER — PATIENT OUTREACH (OUTPATIENT)
Dept: CASE MANAGEMENT | Age: 57
End: 2022-05-27

## 2022-05-27 DIAGNOSIS — B37.2 MONILIASIS SKIN: Primary | ICD-10-CM

## 2022-05-27 PROCEDURE — 99213 OFFICE O/P EST LOW 20 MIN: CPT | Performed by: FAMILY MEDICINE

## 2022-05-27 RX ORDER — CHLORPHENIRAMINE MALEATE 4 MG
TABLET ORAL 2 TIMES DAILY
Qty: 15 G | Refills: 1 | Status: SHIPPED | OUTPATIENT
Start: 2022-05-27 | End: 2022-07-20

## 2022-05-27 NOTE — PROGRESS NOTES
Grzegorz Christensen is a 64 y.o. female who was seen by synchronous (real-time) audio-video technology on 2022 for Skin Problem ( infection)    She has a skin fold from weight loss  She had a sleeve gastrectomy  2018, she was 235 and now 182  Under the skin fold she gets red and irritated and it smells everyday   no fever or chills. No open sores  Sometimes there is redness and irritation    Assessment & Plan:   Diagnoses and all orders for this visit:    1. Moniliasis skin  -     clotrimazole (LOTRIMIN) 1 % topical cream; Apply  to affected area two (2) times a day. She is still trying to lose more weight she is at 183 now   Her goal is 175 lb  At that time she will need a plastic surgeon to remove the panniculis to avoid more rashes / or developing a cellulitis        Subjective:       Prior to Admission medications    Medication Sig Start Date End Date Taking? Authorizing Provider   clotrimazole (LOTRIMIN) 1 % topical cream Apply  to affected area two (2) times a day. 22  Yes Mary Kelley MD   pantoprazole (PROTONIX) 40 mg tablet Take 1 Tablet by mouth Daily (before breakfast). 3/23/22  Yes Ricardo Yan MD   semaglutide, weight loss, 2.4 mg/0.75 mL pnij 2.4 mg by SubCUTAneous route every seven (7) days. Yes Provider, Historical   buPROPion SR (WELLBUTRIN SR) 100 mg SR tablet Take 100 mg po qam prn. Yes Provider, Historical   cholecalciferol, vitamin D3, (Vitamin D3) 50 mcg (2,000 unit) tab Take 100 mcg by mouth every evening. Yes Provider, Historical   hydrOXYzine HCL (ATARAX) 10 mg tablet Take 10 mg by mouth nightly as needed. Yes Provider, Historical   OTHER,NON-FORMULARY, Compounded Estradiol/Estriol/Progesterone 1 cap po qhs   Yes Provider, Historical   cetirizine (ZyrTEC) 10 mg tablet Take 10 mg by mouth daily as needed for Allergies. Yes Provider, Historical   iodine (Kelp, iodine,) 150 mcg tab Take 150 mcg po twice a week.    Yes Provider, Historical multivitamin (ONE A DAY) tablet Take 1 Tab by mouth daily. Yes Provider, Historical   methylPREDNISolone (Medrol, Catalino,) 4 mg tablet As per instructions on the pack  Patient not taking: Reported on 3/28/2022 3/22/22   Bakari Pulido MD     Patient Active Problem List    Diagnosis Date Noted    Chest pain 03/20/2022    Biliary calculus with obstruction without cholecystitis 01/28/2018    Controlled type 2 diabetes mellitus without complication, without long-term current use of insulin ([address] 11/16/2016    BREE (obstructive sleep apnea) 10/19/2016    Diabetes mellitus type 2, controlled ([address] 09/09/2016    Obesity (BMI 30-39.9) 08/21/2016    Prediabetes 08/21/2016    New daily persistent headache 08/21/2016    GERD (gastroesophageal reflux disease) 09/28/2009    Chronic back pain 09/28/2009    Overweight(278.02) 09/28/2009    Fatigue 09/28/2009     Current Outpatient Medications   Medication Sig Dispense Refill    clotrimazole (LOTRIMIN) 1 % topical cream Apply  to affected area two (2) times a day. 15 g 1    pantoprazole (PROTONIX) 40 mg tablet Take 1 Tablet by mouth Daily (before breakfast). 14 Tablet 0    semaglutide, weight loss, 2.4 mg/0.75 mL pnij 2.4 mg by SubCUTAneous route every seven (7) days.  buPROPion SR (WELLBUTRIN SR) 100 mg SR tablet Take 100 mg po qam prn.  cholecalciferol, vitamin D3, (Vitamin D3) 50 mcg (2,000 unit) tab Take 100 mcg by mouth every evening.  hydrOXYzine HCL (ATARAX) 10 mg tablet Take 10 mg by mouth nightly as needed.  OTHER,NON-FORMULARY, Compounded Estradiol/Estriol/Progesterone 1 cap po qhs      cetirizine (ZyrTEC) 10 mg tablet Take 10 mg by mouth daily as needed for Allergies.  iodine (Kelp, iodine,) 150 mcg tab Take 150 mcg po twice a week.  multivitamin (ONE A DAY) tablet Take 1 Tab by mouth daily.       methylPREDNISolone (Medrol, Catalino,) 4 mg tablet As per instructions on the pack (Patient not taking: Reported on 3/28/2022) 1 Dose Pack 0       ROS    Objective:     Patient-Reported Vitals 5/27/2022   Patient-Reported Weight 183lb   Patient-Reported Height -        [INSTRUCTIONS:  \"[x]\" Indicates a positive item  \"[]\" Indicates a negative item  -- DELETE ALL ITEMS NOT EXAMINED]    Constitutional: [x] Appears well-developed and well-nourished [x] No apparent distress      [] Abnormal -     Mental status: [x] Alert and awake  [x] Oriented to person/place/time [x] Able to follow commands    [] Abnormal -     Eyes:   EOM    [x]  Normal    [] Abnormal -   Sclera  [x]  Normal    [] Abnormal -          Discharge [x]  None visible   [] Abnormal -     HENT: [x] Normocephalic, atraumatic  [] Abnormal -   [x] Mouth/Throat: Mucous membranes are moist    External Ears [x] Normal  [] Abnormal -    Neck: [x] No visualized mass [] Abnormal -     Pulmonary/Chest: [x] Respiratory effort normal   [x] No visualized signs of difficulty breathing or respiratory distress        [] Abnormal -      Musculoskeletal:   [x] Normal gait with no signs of ataxia         [x] Normal range of motion of neck        [] Abnormal -     Neurological:        [x] No Facial Asymmetry (Cranial nerve 7 motor function) (limited exam due to video visit)          [x] No gaze palsy        [] Abnormal -          Skin:        [x] No significant exanthematous lesions or discoloration noted on facial skin         [] Abnormal -            Psychiatric:       [x] Normal Affect [] Abnormal -        [x] No Hallucinations    Other pertinent observable physical exam findings:-        We discussed the expected course, resolution and complications of the diagnosis(es) in detail. Medication risks, benefits, costs, interactions, and alternatives were discussed as indicated. I advised her to contact the office if her condition worsens, changes or fails to improve as anticipated. She expressed understanding with the diagnosis(es) and plan.        Adán Madrigal, was evaluated through a synchronous (real-time) audio-video encounter. The patient (or guardian if applicable) is aware that this is a billable service, which includes applicable co-pays. This Virtual Visit was conducted with patient's (and/or legal guardian's) consent. The visit was conducted pursuant to the emergency declaration under the [address] [place] [address], [address] [place] [address] and the [employer] General Act. Patient identification was verified, and a caregiver was present when appropriate.   The patient was located at: Home: [address] [place] [id number]  The provider was located at: Home:         Lazarus Bolden MD

## 2022-05-27 NOTE — PROGRESS NOTES
1. Have you been to the ER, urgent care clinic since your last visit? Hospitalized since your last visit? No    2. Have you seen or consulted any other health care providers outside of the [address] [place] [address] since your last visit? Include any pap smears or colon screening. No     Chief Complaint   Patient presents with    Skin Problem      infection     3 most recent PHQ Screens 2022   Little interest or pleasure in doing things Not at all   Feeling down, depressed, irritable, or hopeless Not at all   Total Score PHQ 2 0     Abuse Screening Questionnaire 2022   Do you ever feel afraid of your partner? N   Are you in a relationship with someone who physically or mentally threatens you? N   Is it safe for you to go home?  Y     Patient-Reported Vitals 2022   Patient-Reported Weight 183lb   Patient-Reported Height -      Learning Assessment 3/24/2021   PRIMARY LEARNER Patient   HIGHEST LEVEL OF EDUCATION - PRIMARY LEARNER  > 4 YEARS OF COLLEGE   BARRIERS PRIMARY LEARNER NONE   CO-LEARNER CAREGIVER -   PRIMARY LANGUAGE ENGLISH    NEED -   LEARNER PREFERENCE PRIMARY LISTENING   LEARNING SPECIAL TOPICS -   ANSWERED BY patient   RELATIONSHIP SELF

## 2022-06-02 ENCOUNTER — OFFICE VISIT (OUTPATIENT)
Dept: FAMILY MEDICINE CLINIC | Age: 57
End: 2022-06-02
Payer: COMMERCIAL

## 2022-06-02 VITALS
BODY MASS INDEX: 30.05 KG/M2 | TEMPERATURE: 97.2 F | DIASTOLIC BLOOD PRESSURE: 73 MMHG | RESPIRATION RATE: 16 BRPM | HEIGHT: 66 IN | OXYGEN SATURATION: 97 % | SYSTOLIC BLOOD PRESSURE: 105 MMHG | WEIGHT: 187 LBS | HEART RATE: 71 BPM

## 2022-06-02 DIAGNOSIS — R30.0 DYSURIA: ICD-10-CM

## 2022-06-02 DIAGNOSIS — N39.0 URINARY TRACT INFECTION WITH HEMATURIA, SITE UNSPECIFIED: ICD-10-CM

## 2022-06-02 DIAGNOSIS — K21.00 GASTROESOPHAGEAL REFLUX DISEASE WITH ESOPHAGITIS WITHOUT HEMORRHAGE: ICD-10-CM

## 2022-06-02 DIAGNOSIS — R31.9 URINARY TRACT INFECTION WITH HEMATURIA, SITE UNSPECIFIED: ICD-10-CM

## 2022-06-02 DIAGNOSIS — R35.0 URINE FREQUENCY: Primary | ICD-10-CM

## 2022-06-02 LAB
BILIRUB UR QL STRIP: NEGATIVE
GLUCOSE UR-MCNC: NEGATIVE MG/DL
KETONES P FAST UR STRIP-MCNC: NEGATIVE MG/DL
PH UR STRIP: 5.5 [PH] (ref 4.6–8)
PROT UR QL STRIP: ABNORMAL
SP GR UR STRIP: 1.03 (ref 1–1.03)
UA UROBILINOGEN AMB POC: ABNORMAL (ref 0.2–1)
URINALYSIS CLARITY POC: ABNORMAL
URINALYSIS COLOR POC: YELLOW
URINE BLOOD POC: ABNORMAL
URINE LEUKOCYTES POC: ABNORMAL
URINE NITRITES POC: NEGATIVE

## 2022-06-02 PROCEDURE — 81002 URINALYSIS NONAUTO W/O SCOPE: CPT | Performed by: FAMILY MEDICINE

## 2022-06-02 PROCEDURE — 99214 OFFICE O/P EST MOD 30 MIN: CPT | Performed by: FAMILY MEDICINE

## 2022-06-02 RX ORDER — SULFAMETHOXAZOLE AND TRIMETHOPRIM 800; 160 MG/1; MG/1
1 TABLET ORAL 2 TIMES DAILY
Qty: 20 TABLET | Refills: 0 | Status: SHIPPED | OUTPATIENT
Start: 2022-06-02 | End: 2022-06-12

## 2022-06-02 RX ORDER — PANTOPRAZOLE SODIUM 40 MG/1
40 TABLET, DELAYED RELEASE ORAL
Qty: 14 TABLET | Refills: 0 | Status: SHIPPED | OUTPATIENT
Start: 2022-06-02

## 2022-06-02 NOTE — PROGRESS NOTES
Chief Complaint   Patient presents with    Urinary Frequency     she is a 64y.o. year old female who presents for evalution. She has burning with urination for 5 days, her urine is dark and has an odor  No fever but there is some bladder pain  She has used cranberry juice and that made it feel a little better      She also c/o more reflux  She is post gastric sleeve      Reviewed PmHx, RxHx, FmHx, SocHx, AllgHx and updated and dated in the chart. Aspirin yes ____   No____ N/A____    Patient Active Problem List    Diagnosis    Chest pain    Biliary calculus with obstruction without cholecystitis    Controlled type 2 diabetes mellitus without complication, without long-term current use of insulin ([facility]    BREE (obstructive sleep apnea)    Diabetes mellitus type 2, controlled (HCC)    Obesity (BMI 30-39. 9)    Prediabetes    New daily persistent headache    GERD (gastroesophageal reflux disease)    Chronic back pain    Overweight(278.02)    Fatigue       Nurse notes were reviewed and copied and are correct  Review of Systems - negative except as listed above in the HPI    Objective:     Vitals:    06/02/22 0810   BP: 105/73   Pulse: 71   Resp: 16   Temp: 97.2 °F (36.2 °C)   TempSrc: Temporal   SpO2: 97%   Weight: 187 lb (84.8 kg)   Height: 5' 6\" (1.676 m)     Physical Examination: General appearance - alert, well appearing, and in no distress  Mental status - alert, oriented to person, place, and time  Chest - clear to auscultation, no wheezes, rales or rhonchi, symmetric air entry  Heart - normal rate, regular rhythm, normal S1, S2, no murmurs, rubs, clicks or gallops  Abdomen - soft, nontender, nondistended, no masses or organomegaly       Assessment/ Plan:   Diagnoses and all orders for this visit:    1. Urine frequency  -     AMB POC URINE DIP STICK MANUAL W/O MICRO CHEMSTRIP-10    2. Dysuria  -     CULTURE, URINE;  Future  -     trimethoprim-sulfamethoxazole (BACTRIM DS, SEPTRA DS) 160-800 mg per tablet; Take 1 Tablet by mouth two (2) times a day for 10 days. 3. Urinary tract infection with hematuria, site unspecified  -     trimethoprim-sulfamethoxazole (BACTRIM DS, SEPTRA DS) 160-800 mg per tablet; Take 1 Tablet by mouth two (2) times a day for 10 days. 4. Gastroesophageal reflux disease with esophagitis without hemorrhage  -     pantoprazole (PROTONIX) 40 mg tablet; Take 1 Tablet by mouth Daily (before breakfast). the sleeve increases the risk  She admits to eating in bed  Stop eating in bed, get a wedge pillow      ICD-10-CM ICD-9-CM    1. Urine frequency  R35.0 788.41 AMB POC URINE DIP STICK MANUAL W/O MICRO CHEMSTRIP-10   2. Dysuria  R30.0 788. 1 CULTURE, URINE      trimethoprim-sulfamethoxazole (BACTRIM DS, SEPTRA DS) 160-800 mg per tablet      CULTURE, URINE   3. Urinary tract infection with hematuria, site unspecified  N39.0 599.0 trimethoprim-sulfamethoxazole (BACTRIM DS, SEPTRA DS) 160-800 mg per tablet    R31.9 599.70    4. Gastroesophageal reflux disease with esophagitis without hemorrhage  K21.00 530.81 pantoprazole (PROTONIX) 40 mg tablet     530.10        I have discussed the diagnosis with the patient and the intended plan as seen in the above orders. The patient has received an after-visit summary  if not on [facility] and questions were answered concerning future plans. Patients in [facility] have access to avs without printing    Medication Side Effects and Warnings were discussed with patient:   Patient Labs were reviewed and or requested:   Patient Past Records were reviewed and or requested: yes        There are no Patient Instructions on file for this visit.

## 2022-06-02 NOTE — PROGRESS NOTES
Darryn Jasso is a 64 y.o. female      Chief Complaint   Patient presents with    Urinary Frequency         1. Have you been to the ER, urgent care clinic since your last visit? Hospitalized since your last visit? No      2. Have you seen or consulted any other health care providers outside of the [address] [place] [address] since your last visit? Include any pap smears or colon screening.   No

## 2022-06-05 LAB
BACTERIA SPEC CULT: ABNORMAL
CC UR VC: ABNORMAL
SERVICE CMNT-IMP: ABNORMAL

## 2022-06-12 NOTE — PROGRESS NOTES
The bacteria that grew in the urine culture is killed by the antibiotic that you took. And many other antibiotics. It appears to be an easy one to kill.  If you have more symptoms let me know

## 2022-06-20 RX ORDER — NIRMATRELVIR AND RITONAVIR 150-100 MG
3 KIT ORAL 2 TIMES DAILY
Qty: 1 BOX | Refills: 0 | Status: SHIPPED | OUTPATIENT
Start: 2022-06-20 | End: 2022-06-25

## 2022-06-20 NOTE — PROGRESS NOTES
Tested pos for covid 3 days ago in [place].  She is home now and needs treatment  C/o congestion, cough and ear pain  Given rx for paxlovid  Call to make VV if not better in 3 days

## 2022-07-07 ENCOUNTER — TRANSCRIBE ORDER (OUTPATIENT)
Dept: MAMMOGRAPHY | Age: 57
End: 2022-07-07

## 2022-07-07 DIAGNOSIS — Z12.31 VISIT FOR SCREENING MAMMOGRAM: Primary | ICD-10-CM

## 2022-07-19 LAB — HBA1C MFR BLD HPLC: 5.6 %

## 2022-07-20 ENCOUNTER — OFFICE VISIT (OUTPATIENT)
Dept: SLEEP MEDICINE | Age: 57
End: 2022-07-20
Payer: COMMERCIAL

## 2022-07-20 VITALS
HEART RATE: 73 BPM | BODY MASS INDEX: 30.63 KG/M2 | SYSTOLIC BLOOD PRESSURE: 114 MMHG | DIASTOLIC BLOOD PRESSURE: 72 MMHG | WEIGHT: 190.6 LBS | OXYGEN SATURATION: 100 % | HEIGHT: 66 IN

## 2022-07-20 DIAGNOSIS — G47.19 EXCESSIVE DAYTIME SLEEPINESS: ICD-10-CM

## 2022-07-20 DIAGNOSIS — G47.33 OSA (OBSTRUCTIVE SLEEP APNEA): Primary | ICD-10-CM

## 2022-07-20 PROCEDURE — 99204 OFFICE O/P NEW MOD 45 MIN: CPT | Performed by: SPECIALIST

## 2022-07-20 NOTE — PROGRESS NOTES
[address] [facility] [place] [place], [address]  Tel.  [phone]  Fax. [address] [facility] [address]  [place], [address] [facility]  Tel.  [phone]  Fax. [phone] [address] [facility] [address] [place]   Tel.  [phone]  Fax. [phone]       Chief Complaint       Chief Complaint   Patient presents with    Sleep Problem     NP, refd by Prema Moss for sleep consult, last ss over 5 years ago. HPI      Tae Ryan is 64 y.o. female seen for evaluation of a sleep disorder. She had a past evaluation at [facility]; she was diagnosed with sleep apnea and started on CPAP. In 2018 she underwent gastric sleeve surgery noting that she lost approximately 55 pounds. The patient retires at 6 PM and will awaken 3-4 times during the night. She awakens with alarm at 8: 40 5 AM.  She describes her self as tired on awakening and during the day. She notes daytime sleepiness. She may doze if she is seated and inactive socially when reading, watching TV, in public place such as movies, as a passenger. [place] Sleepiness Score: 16       Allergies   Allergen Reactions    Apple Itching    Kiwi Itching    Peanut Itching       Current Outpatient Medications   Medication Sig Dispense Refill    pantoprazole (PROTONIX) 40 mg tablet Take 1 Tablet by mouth Daily (before breakfast). 14 Tablet 0    cholecalciferol, vitamin D3, 50 mcg (2,000 unit) tab Take 100 mcg by mouth every evening.      multivitamin (ONE A DAY) tablet Take 1 Tab by mouth daily. OTHER,NON-FORMULARY, Compounded Estradiol/Estriol/Progesterone 1 cap po qhs          She  has a past medical history of Diabetes ([facility] She  has a past surgical history that includes hx  section; hx hernia repair; hx tonsillectomy; and hx lap cholecystectomy (2018).     She family history includes Cancer in her maternal grandmother; Diabetes in her father, paternal grandfather, and paternal grandmother; Elevated Lipids in her mother and sister; Heart Disease in her paternal grandfather and paternal grandmother; Hypertension in her father, paternal grandfather, and paternal grandmother; Thyroid Disease in her sister. She  reports that she has quit smoking. Her smoking use included cigars and cigarettes. She smoked an average of .1 packs per day. She has never used smokeless tobacco. She reports current alcohol use. She reports that she does not use drugs. Review of Systems:  ROS      Objective:   Visit Vitals  /72   Pulse 73   Ht 5' 5.75\" (1.67 m)   Wt 190 lb 9.6 oz (86.5 kg)   SpO2 100%   BMI 31.00 kg/m²     Body mass index is 31 kg/m². General:   Conversant, cooperative   Eyes:  Pupils equal and reactive, no nystagmus   Oropharynx:   Mallampati score III, tongue scalloped, narrow posterior oral airway       Neck:   No carotid bruits; Neck circ. in \"inches\": 14.5   Chest/Lungs:  Clear on auscultation    CVS:  Normal rate, regular rhythm   Skin:  Warm to touch; no obvious rashes   Neuro:  Speech fluent, face symmetrical, tongue movement normal   Psych:  Normal affect,  normal countenance        Assessment:       ICD-10-CM ICD-9-CM    1. BREE (obstructive sleep apnea)  G47.33 327.23 SLEEP STUDY UNATTENDED, 4 CHANNEL      2. Excessive daytime sleepiness  G47.19 780.54         History consistent with sleep disordered breathing. Evaluation with a home sleep test.     Plan:     Orders Placed This Encounter    SLEEP STUDY UNATTENDED, 4 CHANNEL     Order Specific Question:   Reason for Exam     Answer:   snoring       * Patient has a history and examination consistent with the diagnosis of sleep apnea. * Sleep testing was ordered for evaluation. * She was provided information on sleep apnea including corresponding risk factors and the importance of proper treatment. * Treatment options if indicated were reviewed today.       Instructions:    Do not engage in activities requiring a normal degree of alertness if fatigue is present. The patient understands that untreated or undertreated sleep apnea could impair judgement and the ability to function normally during the day. Call or return if symptoms worsen or persist.          Margarita Quinn MD, [facility]  Electronically signed 07/20/22       This note was created using voice recognition software. Despite editing, there may be syntax errors. This note will not be viewable in [address]

## 2022-09-12 ENCOUNTER — HOSPITAL ENCOUNTER (OUTPATIENT)
Dept: SLEEP MEDICINE | Age: 57
Discharge: HOME | End: 2022-09-12
Payer: COMMERCIAL

## 2022-09-12 ENCOUNTER — OFFICE VISIT (OUTPATIENT)
Dept: SLEEP MEDICINE | Age: 57
End: 2022-09-12

## 2022-09-12 DIAGNOSIS — G47.33 OSA (OBSTRUCTIVE SLEEP APNEA): Primary | ICD-10-CM

## 2022-09-12 PROCEDURE — 95806 SLEEP STUDY UNATT&RESP EFFT: CPT | Performed by: SPECIALIST

## 2022-09-12 NOTE — PROGRESS NOTES
[address] [facility] [address] [place], [address]  Tel.  [phone]  Fax. [address] [facility]  [place], [address] [facility]  Tel.  [phone]  Fax. [phone] [address]   Tel.  [phone]  Fax. [phone]       S>Elena Somers is a 64 y.o. female seen today to receive a home sleep testing unit (HST). Patient was educated on proper hookup and operation of the HST. Instruction forms and documentation were reviewed and signed. The patient demonstrated good understanding of the HST.    O>    There were no vitals taken for this visit. A>  No diagnosis found. P>  General information regarding operations and maintenance of the device was provided. She was provided information on sleep apnea including coresponding risk factors and the importance of proper treatment. Follow-up appointment was made to return the HST. She will be contacted once the results have been reviewed. She was asked to contact our office for any problems regarding her home sleep test study.     [facility]

## 2022-09-13 ENCOUNTER — TELEPHONE (OUTPATIENT)
Dept: SLEEP MEDICINE | Age: 57
End: 2022-09-13

## 2022-09-13 DIAGNOSIS — G47.33 OSA (OBSTRUCTIVE SLEEP APNEA): Primary | ICD-10-CM

## 2022-09-19 ENCOUNTER — TELEPHONE (OUTPATIENT)
Dept: SLEEP MEDICINE | Age: 57
End: 2022-09-19

## 2022-09-28 ENCOUNTER — HOSPITAL ENCOUNTER (OUTPATIENT)
Dept: SLEEP MEDICINE | Age: 57
Discharge: HOME | End: 2022-09-28
Attending: SPECIALIST
Payer: COMMERCIAL

## 2022-09-28 VITALS
TEMPERATURE: 96.4 F | HEIGHT: 65 IN | SYSTOLIC BLOOD PRESSURE: 118 MMHG | HEART RATE: 63 BPM | WEIGHT: 190 LBS | DIASTOLIC BLOOD PRESSURE: 61 MMHG | OXYGEN SATURATION: 99 % | BODY MASS INDEX: 31.65 KG/M2

## 2022-09-28 DIAGNOSIS — G47.33 OSA (OBSTRUCTIVE SLEEP APNEA): ICD-10-CM

## 2022-09-28 PROCEDURE — 95810 POLYSOM 6/> YRS 4/> PARAM: CPT | Performed by: SPECIALIST

## 2022-09-29 NOTE — PROGRESS NOTES
[address] [facility]. [place], [address]  Tel.  [phone]  Fax. 100 [facility] [address]  [place], [address] [facility]  Tel.  [phone]  Fax. [phone] [address] [facility] [address]   Tel.  [phone]  Fax. [phone]     Sleep Study Technical Notes        PRE-Test:  Kuldip Srinivasan (: 1965) arrived in the lobby. Patient was greeted and screening questions asked. The patient was taken directly to his/her room. Vitals:  Temperature (96.4)   BP (118/61)   SaO2 (99)   Weight per patient (190)  Procedure explained to the patient and questions were answered. The patient expressed understanding of the procedure. Electrodes were applied with measurements for the head altered due to hairstyle. The patient was placed in bed and the study was started. Acquisition Notes:  Lights off: 9:06pm with sleep onset within 45 minutes. Patient slept on both sides and supine. All stages of sleep observed. Respiratory events: Hypopnea  ECG:  NSR  Snoring:  No audible snoring  Positional therapy with: Other comments:   Patient had caregiver in attendance:  No  Patient watched TV or on phone after lights out for 40 minutes  Patient slept with positional therapy:  No  Patient slept with head of bed elevated:  No  Patient wore an oral appliance:  No  Patient to bathroom 0 times      POST Test:  Patient was awakened. Electrodes were removed. The patient was discharged after answering the Post Questionnaire. Patient stated that she was alert and ok to drive. Equipment and room cleaned per infection control policy.

## 2022-09-30 ENCOUNTER — TELEPHONE (OUTPATIENT)
Dept: SLEEP MEDICINE | Age: 57
End: 2022-09-30

## 2022-10-18 NOTE — TELEPHONE ENCOUNTER
PSG performed for potential sleep disordered breathing.  532.3 minutes recorded of which 416 minutes spent asleep with a sleep efficiency of 78.2%. Sleep onset at 37.6 minutes; REM onset at 106 minutes with total REM representing 13% of sleep time. All sleep stages observed. 6 respiratory events occurred at which 5 hypopnea and 1 central apnea. AHI 0.9/h. Patient slept supine and lateral.  Minimal SaO2 89%. Snoring not noted. Sleep technologist: Please advise patient of study results.

## 2022-10-20 ENCOUNTER — TELEPHONE (OUTPATIENT)
Dept: SLEEP MEDICINE | Age: 57
End: 2022-10-20

## 2022-10-20 NOTE — TELEPHONE ENCOUNTER
Reviewed sleep study results with patient. She expressed understanding and is willing to proceed with attended PSG.

## 2022-10-28 ENCOUNTER — VIRTUAL VISIT (OUTPATIENT)
Dept: FAMILY MEDICINE CLINIC | Age: 57
End: 2022-10-28
Payer: COMMERCIAL

## 2022-10-28 DIAGNOSIS — R40.0 DAYTIME SOMNOLENCE: ICD-10-CM

## 2022-10-28 DIAGNOSIS — E11.9 DIABETES MELLITUS WITHOUT COMPLICATION (HCC): Primary | ICD-10-CM

## 2022-10-28 PROCEDURE — 99214 OFFICE O/P EST MOD 30 MIN: CPT | Performed by: FAMILY MEDICINE

## 2022-10-28 PROCEDURE — 3044F HG A1C LEVEL LT 7.0%: CPT | Performed by: FAMILY MEDICINE

## 2022-10-28 RX ORDER — SEMAGLUTIDE 1.34 MG/ML
0.25 INJECTION, SOLUTION SUBCUTANEOUS
Qty: 1 BOX | Refills: 1 | Status: SHIPPED | OUTPATIENT
Start: 2022-10-28

## 2022-10-28 NOTE — PROGRESS NOTES
1. Have you been to the ER, urgent care clinic since your last visit? Hospitalized since your last visit? No    2. Have you seen or consulted any other health care providers outside of the [address] [place] [address] since your last visit? Include any pap smears or colon screening.  No    Chief Complaint   Patient presents with    Diabetes     Health Maintenance Due   Topic Date Due    Foot Exam Q1  Never done    Hepatitis B Vaccine (1 of 3 - Risk 3-dose series) Never done    Cervical cancer screen  Never done    Colorectal Cancer Screening Combo  Never done    Shingrix Vaccine Age 50> (1 of 2) Never done    MICROALBUMIN Q1  03/17/2018    Eye Exam Retinal or Dilated  04/17/2020    COVID-19 Vaccine (3 - Booster for Moderna series) 05/26/2021    Breast Cancer Screen Mammogram  03/12/2022    Flu Vaccine (1) 08/01/2022     3 most recent PHQ Screens 10/28/2022   Little interest or pleasure in doing things Not at all   Feeling down, depressed, irritable, or hopeless Not at all   Total Score PHQ 2 0     Learning Assessment 3/24/2021   PRIMARY LEARNER Patient   HIGHEST LEVEL OF EDUCATION - PRIMARY LEARNER  > 4 YEARS [place] PRIMARY LEARNER NONE   CO-LEARNER CAREGIVER -   PRIMARY LANGUAGE ENGLISH    NEED -   LEARNER PREFERENCE PRIMARY LISTENING   LEARNING SPECIAL TOPICS -   ANSWERED BY patient   RELATIONSHIP SELF     Patient-Reported Vitals 10/28/2022   Patient-Reported Weight 192lb   Patient-Reported Height -

## 2022-10-28 NOTE — PROGRESS NOTES
Kady Berry is a 64 y.o. female who was seen by synchronous (real-time) audio-video technology on 10/28/2022 for Diabetes    She has a h/o diabetes  Tried metformin and did not tolerate it  She had the sleeve gastrectomy and lost down as low as 181 and now is back up at 190  She is struggling to stay in control of her weight    Also having interruptions in sleep  Had a recent sleep test that was negative but still c/o significant daytime somnolence and weight gain            Assessment & Plan:   Diagnoses and all orders for this visit:    1. Diabetes mellitus without complication ([facility] [address]  -     semaglutide (Ozempic) 0.25 mg or 0.5 mg/dose (2 mg/1.5 ml) subq pen; 0.25 mg by SubCUTAneous route every seven (7) days. 2. Daytime somnolence  -     SLEEP MEDICINE REFERRAL  Get another sleep study  I think one night test is not representative of a persons avg sleep  This might be contributing to weight regain        Subjective:       Prior to Admission medications    Medication Sig Start Date End Date Taking? Authorizing Provider   semaglutide (Ozempic) 0.25 mg or 0.5 mg/dose (2 mg/1.5 ml) subq pen 0.25 mg by SubCUTAneous route every seven (7) days. 10/28/22  Yes Ana Wilcox MD   pantoprazole (PROTONIX) 40 mg tablet Take 1 Tablet by mouth Daily (before breakfast). 6/2/22  Yes Ana Wilcox MD   cholecalciferol, vitamin D3, 50 mcg (2,000 unit) tab Take 100 mcg by mouth every evening. Yes Provider, Historical   OTHER,NON-FORMULARY, Compounded Estradiol/Estriol/Progesterone 1 cap po qhs   Yes Provider, Historical   multivitamin (ONE A DAY) tablet Take 1 Tab by mouth daily.    Yes Provider, Historical     Patient Active Problem List    Diagnosis Date Noted    Chest pain 03/20/2022    Biliary calculus with obstruction without cholecystitis 01/28/2018    Controlled type 2 diabetes mellitus without complication, without long-term current use of insulin [facility] 11/16/2016    BREE (obstructive sleep apnea) 10/19/2016 Diabetes mellitus type 2, controlled ([facility] 09/09/2016    Obesity (BMI 30-39.9) 08/21/2016    Prediabetes 08/21/2016    New daily persistent headache 08/21/2016    GERD (gastroesophageal reflux disease) 09/28/2009    Chronic back pain 09/28/2009    Overweight(278.02) 09/28/2009    Fatigue 09/28/2009     Current Outpatient Medications   Medication Sig Dispense Refill    semaglutide (Ozempic) 0.25 mg or 0.5 mg/dose (2 mg/1.5 ml) subq pen 0.25 mg by SubCUTAneous route every seven (7) days. 1 Box 1    pantoprazole (PROTONIX) 40 mg tablet Take 1 Tablet by mouth Daily (before breakfast). 14 Tablet 0    cholecalciferol, vitamin D3, 50 mcg (2,000 unit) tab Take 100 mcg by mouth every evening. OTHER,NON-FORMULARY, Compounded Estradiol/Estriol/Progesterone 1 cap po qhs      multivitamin (ONE A DAY) tablet Take 1 Tab by mouth daily.          ROS    Objective:     Patient-Reported Vitals 10/28/2022   Patient-Reported Weight 192lb   Patient-Reported Height -        [INSTRUCTIONS:  \"[x]\" Indicates a positive item  \"[]\" Indicates a negative item  -- DELETE ALL ITEMS NOT EXAMINED]    Constitutional: [x] Appears well-developed and well-nourished [x] No apparent distress      [] Abnormal -     Mental status: [x] Alert and awake  [x] Oriented to person/place/time [x] Able to follow commands    [] Abnormal -     Eyes:   EOM    [x]  Normal    [] Abnormal -   Sclera  [x]  Normal    [] Abnormal -          Discharge [x]  None visible   [] Abnormal -     HENT: [x] Normocephalic, atraumatic  [] Abnormal -   [x] Mouth/Throat: Mucous membranes are moist    External Ears [x] Normal  [] Abnormal -    Neck: [x] No visualized mass [] Abnormal -     Pulmonary/Chest: [x] Respiratory effort normal   [x] No visualized signs of difficulty breathing or respiratory distress        [] Abnormal -      Musculoskeletal:   [x] Normal gait with no signs of ataxia         [x] Normal range of motion of neck        [] Abnormal -     Neurological:        [x] No Facial Asymmetry (Cranial nerve 7 motor function) (limited exam due to video visit)          [x] No gaze palsy        [] Abnormal -          Skin:        [x] No significant exanthematous lesions or discoloration noted on facial skin         [] Abnormal -            Psychiatric:       [x] Normal Affect [] Abnormal -        [x] No Hallucinations    Other pertinent observable physical exam findings:-        We discussed the expected course, resolution and complications of the diagnosis(es) in detail. Medication risks, benefits, costs, interactions, and alternatives were discussed as indicated. I advised her to contact the office if her condition worsens, changes or fails to improve as anticipated. She expressed understanding with the diagnosis(es) and plan. Yasir Aguilar, was evaluated through a synchronous (real-time) audio-video encounter. The patient (or guardian if applicable) is aware that this is a billable service, which includes applicable co-pays. This Virtual Visit was conducted with patient's (and/or legal guardian's) consent. The visit was conducted pursuant to the emergency declaration under the [address] [place] [address] authority and the [employer] General Act. Patient identification was verified, and a caregiver was present when appropriate.   The patient was located at: Home: [address] [place] [phone]  The provider was located at: Home: [unfilled]        Pilo Doty MD

## 2023-01-17 DIAGNOSIS — E11.9 DIABETES MELLITUS WITHOUT COMPLICATION (HCC): ICD-10-CM

## 2023-01-17 RX ORDER — SEMAGLUTIDE 1.34 MG/ML
0.5 INJECTION, SOLUTION SUBCUTANEOUS
Qty: 1 BOX | Refills: 1 | Status: SHIPPED | OUTPATIENT
Start: 2023-01-17

## 2023-01-26 ENCOUNTER — OFFICE VISIT (OUTPATIENT)
Dept: FAMILY MEDICINE CLINIC | Age: 58
End: 2023-01-26
Payer: COMMERCIAL

## 2023-01-26 VITALS
HEIGHT: 65 IN | WEIGHT: 195 LBS | RESPIRATION RATE: 16 BRPM | DIASTOLIC BLOOD PRESSURE: 70 MMHG | BODY MASS INDEX: 32.49 KG/M2 | TEMPERATURE: 97.5 F | HEART RATE: 84 BPM | SYSTOLIC BLOOD PRESSURE: 106 MMHG

## 2023-01-26 DIAGNOSIS — E11.9 DIABETES MELLITUS WITHOUT COMPLICATION (HCC): ICD-10-CM

## 2023-01-26 DIAGNOSIS — K21.00 GASTROESOPHAGEAL REFLUX DISEASE WITH ESOPHAGITIS WITHOUT HEMORRHAGE: ICD-10-CM

## 2023-01-26 DIAGNOSIS — N30.90 BLADDER INFECTION: Primary | ICD-10-CM

## 2023-01-26 DIAGNOSIS — M72.2 PLANTAR FASCIITIS: ICD-10-CM

## 2023-01-26 LAB
BILIRUB UR QL STRIP: NEGATIVE
GLUCOSE UR-MCNC: NEGATIVE MG/DL
KETONES P FAST UR STRIP-MCNC: NORMAL MG/DL
PH UR STRIP: 6 [PH] (ref 4.6–8)
PROT UR QL STRIP: NORMAL
SP GR UR STRIP: 1.03 (ref 1–1.03)
UA UROBILINOGEN AMB POC: NORMAL (ref 0.2–1)
URINALYSIS CLARITY POC: NORMAL
URINALYSIS COLOR POC: NORMAL
URINE BLOOD POC: NORMAL
URINE LEUKOCYTES POC: NORMAL
URINE NITRITES POC: NEGATIVE

## 2023-01-26 RX ORDER — PANTOPRAZOLE SODIUM 40 MG/1
40 TABLET, DELAYED RELEASE ORAL
Qty: 90 TABLET | Refills: 1 | Status: SHIPPED | OUTPATIENT
Start: 2023-01-26

## 2023-01-26 RX ORDER — SEMAGLUTIDE 1.34 MG/ML
1 INJECTION, SOLUTION SUBCUTANEOUS
Qty: 3 EACH | Refills: 0 | Status: SHIPPED | OUTPATIENT
Start: 2023-02-20

## 2023-01-26 RX ORDER — SULFAMETHOXAZOLE AND TRIMETHOPRIM 800; 160 MG/1; MG/1
1 TABLET ORAL 2 TIMES DAILY
Qty: 20 TABLET | Refills: 0 | Status: SHIPPED | OUTPATIENT
Start: 2023-01-26 | End: 2023-02-05

## 2023-01-26 NOTE — PROGRESS NOTES
Chief Complaint   Patient presents with    UTI     X2-3 days. Blood in Urine    Urinary Frequency     she is a 62y.o. year old female who presents for evalution. C/o pain under the foot  She has PT for this. Wearing a boot at night  She wants an insert for the shoes  Also had blood in urine a few days ago  No fever or chills or flank pain    She frequently gets  uti associated with intercourse        Reviewed PmHx, RxHx, FmHx, SocHx, AllgHx and updated and dated in the chart. Aspirin yes ____   No____ N/A____    Patient Active Problem List    Diagnosis    Chest pain    Biliary calculus with obstruction without cholecystitis    Controlled type 2 diabetes mellitus without complication, without long-term current use of insulin (HCC)    BREE (obstructive sleep apnea)    Diabetes mellitus type 2, controlled (HCC)    Obesity (BMI 30-39. 9)    Prediabetes    New daily persistent headache    GERD (gastroesophageal reflux disease)    Chronic back pain    Overweight(278.02)    Fatigue       Nurse notes were reviewed and copied and are correct  Review of Systems - negative except as listed above in the HPI    Objective:     Vitals:    01/26/23 1509   BP: 106/70   Pulse: 84   Resp: 16   Temp: 97.5 °F (36.4 °C)   TempSrc: Skin   Weight: 195 lb (88.5 kg)   Height: 5' 5\" (1.651 m)     Physical Examination: General appearance - alert, well appearing, and in no distress  Mental status - alert, oriented to person, place, and time  Chest - clear to auscultation, no wheezes, rales or rhonchi, symmetric air entry  Heart - normal rate, regular rhythm, normal S1, S2, no murmurs, rubs, clicks or gallops  Abdomen - soft, nontender, nondistended, no masses or organomegaly  No CVA tenderness         Assessment/ Plan:   Diagnoses and all orders for this visit:    1.  Bladder infection  -     AMB POC URINALYSIS DIP STICK AUTO W/ MICRO  -     URINALYSIS W/ REFLEX CULTURE; Future  -     trimethoprim-sulfamethoxazole (BACTRIM DS, SEPTRA DS) 160-800 mg per tablet; Take 1 Tablet by mouth two (2) times a day for 10 days. Treating with bactrim empirically and checking a culture  2. Plantar fasciitis  -     REFERRAL TO PODIATRY  He can make an insert from a mold of her foot  3. Diabetes mellitus without complication (HCC)  -     semaglutide (Ozempic) 1 mg/dose (4 mg/3 mL) pnij; 1 mg by SubCUTAneous route every seven (7) days. Cont the ozempic  4. Gastroesophageal reflux disease with esophagitis without hemorrhage  -     pantoprazole (PROTONIX) 40 mg tablet; Take 1 Tablet by mouth Daily (before breakfast). Refilled the protonix  Having more reflux      ICD-10-CM ICD-9-CM    1. Bladder infection  N30.90 595.9 AMB POC URINALYSIS DIP STICK AUTO W/ MICRO      URINALYSIS W/ REFLEX CULTURE      trimethoprim-sulfamethoxazole (BACTRIM DS, SEPTRA DS) 160-800 mg per tablet      URINALYSIS W/ REFLEX CULTURE      2. Plantar fasciitis  M72.2 728.71 REFERRAL TO PODIATRY      3. Diabetes mellitus without complication (HCC)  S09.6 250.00 semaglutide (Ozempic) 1 mg/dose (4 mg/3 mL) pnij      4. Gastroesophageal reflux disease with esophagitis without hemorrhage  K21.00 530.81 pantoprazole (PROTONIX) 40 mg tablet     530.10           I have discussed the diagnosis with the patient and the intended plan as seen in the above orders. The patient has received an after-visit summary  if not on [facility] and questions were answered concerning future plans. Patients in [facility] have access to avs without printing    Medication Side Effects and Warnings were discussed with patient:   Patient Labs were reviewed and or requested:   Patient Past Records were reviewed and or requested: yes        There are no Patient Instructions on file for this visit.

## 2023-01-26 NOTE — PROGRESS NOTES
1. Have you been to the ER, urgent care clinic since your last visit? Hospitalized since your last visit? No    2. Have you seen or consulted any other health care providers outside of the [address] [place] [address] since your last visit? Include any pap smears or colon screening. No    Chief Complaint   Patient presents with    UTI     X2-3 days. Blood in Urine    Urinary Frequency     Health Maintenance Due   Topic Date Due    Foot Exam Q1  Never done    Diabetic Alb to Cr ratio (uACR) test  Never done    Hepatitis B Vaccine (1 of 3 - Risk 3-dose series) Never done    Cervical cancer screen  Never done    Shingles Vaccine (1 of 2) Never done    Eye Exam Retinal or Dilated  04/17/2020    COVID-19 Vaccine (3 - Booster for Moderna series) 05/26/2021    Breast Cancer Screen Mammogram  03/12/2022    Colorectal Cancer Screening Combo  04/18/2022    Flu Vaccine (1) 08/01/2022     3 most recent PHQ Screens 1/26/2023   Little interest or pleasure in doing things Not at all   Feeling down, depressed, irritable, or hopeless Not at all   Total Score PHQ 2 0     Abuse Screening Questionnaire 10/28/2022   Do you ever feel afraid of your partner? N   Are you in a relationship with someone who physically or mentally threatens you? N   Is it safe for you to go home?  Y     Visit Vitals  /70 (BP 1 Location: Left upper arm, BP Patient Position: Sitting, BP Cuff Size: Adult)   Pulse 84   Temp 97.5 °F (36.4 °C) (Skin)   Resp 16   Ht 5' 5\" (1.651 m)   Wt 195 lb (88.5 kg)   BMI 32.45 kg/m²     Unable to get sp02 due to black fingernail polish

## 2023-01-27 LAB
APPEARANCE UR: CLEAR
BACTERIA URNS QL MICRO: NEGATIVE /HPF
BILIRUB UR QL: NEGATIVE
COLOR UR: ABNORMAL
EPITH CASTS URNS QL MICRO: ABNORMAL /LPF
GLUCOSE UR STRIP.AUTO-MCNC: NEGATIVE MG/DL
HGB UR QL STRIP: ABNORMAL
KETONES UR QL STRIP.AUTO: 15 MG/DL
LEUKOCYTE ESTERASE UR QL STRIP.AUTO: ABNORMAL
NITRITE UR QL STRIP.AUTO: NEGATIVE
PH UR STRIP: 6 (ref 5–8)
PROT UR STRIP-MCNC: ABNORMAL MG/DL
RBC #/AREA URNS HPF: >100 /HPF (ref 0–5)
SP GR UR REFRACTOMETRY: 1.02 (ref 1–1.03)
UA: UC IF INDICATED,UAUC: ABNORMAL
UROBILINOGEN UR QL STRIP.AUTO: 0.2 EU/DL (ref 0.2–1)
WBC URNS QL MICRO: >100 /HPF (ref 0–4)

## 2023-01-28 LAB
BACTERIA SPEC CULT: ABNORMAL
CC UR VC: ABNORMAL
SERVICE CMNT-IMP: ABNORMAL

## 2023-03-28 ENCOUNTER — DOCUMENTATION ONLY (OUTPATIENT)
Dept: SURGERY | Age: 58
End: 2023-03-28

## 2023-03-28 NOTE — PROGRESS NOTES
Pt called and LVM to get an appointment with Dr. Lee Jama for [facility] program. Called back to schedule and patient advised me that someone else already returned her call and she is scheduled for orientation tomorrow.

## 2023-04-19 DIAGNOSIS — Z76.89 ENCOUNTER FOR WEIGHT MANAGEMENT: Primary | ICD-10-CM

## 2023-04-19 DIAGNOSIS — E66.01 MORBID OBESITY (HCC): ICD-10-CM

## 2023-04-19 NOTE — PROGRESS NOTES
Patient attended our VIRTUAL Medically Supervised Weight Loss New Patient Orientation on Wednesday April 19, 2023 where we discussed:  New Direction Very Low and the Low Calorie diet details  Medical Supervision  Nutrition education  Cost of Meal Replacements

## 2023-04-21 DIAGNOSIS — Z12.31 VISIT FOR SCREENING MAMMOGRAM: Primary | ICD-10-CM

## 2023-05-26 ENCOUNTER — TELEPHONE (OUTPATIENT)
Age: 58
End: 2023-05-26

## 2023-05-26 RX ORDER — SEMAGLUTIDE 1.34 MG/ML
1 INJECTION, SOLUTION SUBCUTANEOUS
Qty: 3 ML | Refills: 0 | OUTPATIENT
Start: 2023-05-26 | End: 2023-06-25

## 2023-06-06 ENCOUNTER — TELEPHONE (OUTPATIENT)
Age: 58
End: 2023-06-06

## 2023-06-06 NOTE — TELEPHONE ENCOUNTER
Called patient to follow up on paperwork emailed to her on Friday, 6/2 at 2:01 PM. Patient stated she will completed the paperwork and send it back by tomorrow, Wednesday 6/7.

## 2023-06-12 DIAGNOSIS — Z13.29 SCREENING FOR HYPOTHYROIDISM: ICD-10-CM

## 2023-06-12 DIAGNOSIS — E11.9 CONTROLLED TYPE 2 DIABETES MELLITUS WITHOUT COMPLICATION, WITHOUT LONG-TERM CURRENT USE OF INSULIN (HCC): ICD-10-CM

## 2023-06-12 DIAGNOSIS — R10.816 EPIGASTRIC ABDOMINAL TENDERNESS WITHOUT REBOUND TENDERNESS: ICD-10-CM

## 2023-06-12 LAB
ALBUMIN SERPL-MCNC: 3.9 G/DL (ref 3.5–5)
ALBUMIN/GLOB SERPL: 1.2 (ref 1.1–2.2)
ALP SERPL-CCNC: 89 U/L (ref 45–117)
ALT SERPL-CCNC: 23 U/L (ref 12–78)
ANION GAP SERPL CALC-SCNC: 4 MMOL/L (ref 5–15)
AST SERPL-CCNC: 16 U/L (ref 15–37)
BILIRUB SERPL-MCNC: 0.3 MG/DL (ref 0.2–1)
BUN SERPL-MCNC: 11 MG/DL (ref 6–20)
BUN/CREAT SERPL: 16 (ref 12–20)
CALCIUM SERPL-MCNC: 9.6 MG/DL (ref 8.5–10.1)
CHLORIDE SERPL-SCNC: 110 MMOL/L (ref 97–108)
CO2 SERPL-SCNC: 30 MMOL/L (ref 21–32)
CREAT SERPL-MCNC: 0.7 MG/DL (ref 0.55–1.02)
EST. AVERAGE GLUCOSE BLD GHB EST-MCNC: 117 MG/DL
GLOBULIN SER CALC-MCNC: 3.3 G/DL (ref 2–4)
GLUCOSE SERPL-MCNC: 104 MG/DL (ref 65–100)
HBA1C MFR BLD: 5.7 % (ref 4–5.6)
LIPASE SERPL-CCNC: 104 U/L (ref 73–393)
POTASSIUM SERPL-SCNC: 4.6 MMOL/L (ref 3.5–5.1)
PROT SERPL-MCNC: 7.2 G/DL (ref 6.4–8.2)
SODIUM SERPL-SCNC: 144 MMOL/L (ref 136–145)
TSH SERPL DL<=0.05 MIU/L-ACNC: 1.07 UIU/ML (ref 0.36–3.74)

## 2023-06-13 NOTE — RESULT ENCOUNTER NOTE
The blood sugar A1c test is better but not yet normal. The diet changes and the increase in exercise for weight loss and maintenance will help lower this  The thyroid and the pancreas tests are normal  The kidney and liver tests are normal

## 2023-07-12 ENCOUNTER — TELEMEDICINE (OUTPATIENT)
Age: 58
End: 2023-07-12
Payer: COMMERCIAL

## 2023-07-12 ENCOUNTER — TELEPHONE (OUTPATIENT)
Age: 58
End: 2023-07-12

## 2023-07-12 VITALS — BODY MASS INDEX: 29.09 KG/M2 | HEIGHT: 66 IN | WEIGHT: 181 LBS

## 2023-07-12 DIAGNOSIS — E11.9 CONTROLLED TYPE 2 DIABETES MELLITUS WITHOUT COMPLICATION, WITHOUT LONG-TERM CURRENT USE OF INSULIN (HCC): ICD-10-CM

## 2023-07-12 DIAGNOSIS — E66.3 OVERWEIGHT (BMI 25.0-29.9): Primary | ICD-10-CM

## 2023-07-12 PROCEDURE — 99214 OFFICE O/P EST MOD 30 MIN: CPT | Performed by: FAMILY MEDICINE

## 2023-07-12 PROCEDURE — 3044F HG A1C LEVEL LT 7.0%: CPT | Performed by: FAMILY MEDICINE

## 2023-07-12 RX ORDER — BUPROPION HYDROCHLORIDE 100 MG/1
TABLET, EXTENDED RELEASE ORAL
COMMUNITY

## 2023-07-12 RX ORDER — DICYCLOMINE HYDROCHLORIDE 10 MG/1
CAPSULE ORAL
COMMUNITY
Start: 2023-06-12

## 2023-07-12 ASSESSMENT — PATIENT HEALTH QUESTIONNAIRE - PHQ9
SUM OF ALL RESPONSES TO PHQ QUESTIONS 1-9: 0
SUM OF ALL RESPONSES TO PHQ QUESTIONS 1-9: 0
2. FEELING DOWN, DEPRESSED OR HOPELESS: 0
SUM OF ALL RESPONSES TO PHQ QUESTIONS 1-9: 0
SUM OF ALL RESPONSES TO PHQ9 QUESTIONS 1 & 2: 0
1. LITTLE INTEREST OR PLEASURE IN DOING THINGS: 0
SUM OF ALL RESPONSES TO PHQ QUESTIONS 1-9: 0

## 2023-07-12 NOTE — TELEPHONE ENCOUNTER
Patient was called to begin virtual visit check in. No answer. Voicemail message left asking the patient to give the office a call back.

## 2023-08-09 ENCOUNTER — TELEMEDICINE (OUTPATIENT)
Age: 58
End: 2023-08-09
Payer: COMMERCIAL

## 2023-08-09 VITALS — BODY MASS INDEX: 28.93 KG/M2 | WEIGHT: 180 LBS | HEIGHT: 66 IN

## 2023-08-09 DIAGNOSIS — E66.3 OVERWEIGHT (BMI 25.0-29.9): Primary | ICD-10-CM

## 2023-08-09 DIAGNOSIS — E11.9 CONTROLLED TYPE 2 DIABETES MELLITUS WITHOUT COMPLICATION, WITHOUT LONG-TERM CURRENT USE OF INSULIN (HCC): ICD-10-CM

## 2023-08-09 DIAGNOSIS — E66.3 OVERWEIGHT (BMI 25.0-29.9): ICD-10-CM

## 2023-08-09 PROCEDURE — 99214 OFFICE O/P EST MOD 30 MIN: CPT | Performed by: FAMILY MEDICINE

## 2023-08-09 PROCEDURE — 3044F HG A1C LEVEL LT 7.0%: CPT | Performed by: FAMILY MEDICINE

## 2023-08-09 RX ORDER — CHOLESTYRAMINE 4 G/9G
POWDER, FOR SUSPENSION ORAL
COMMUNITY
Start: 2023-07-08

## 2023-08-09 RX ORDER — MULTIVITAMIN
1 TABLET ORAL DAILY
COMMUNITY

## 2023-08-09 RX ORDER — SEMAGLUTIDE 2.68 MG/ML
2 INJECTION, SOLUTION SUBCUTANEOUS
Qty: 3 ML | Refills: 2 | Status: SHIPPED | OUTPATIENT
Start: 2023-08-09

## 2023-08-09 ASSESSMENT — PATIENT HEALTH QUESTIONNAIRE - PHQ9
SUM OF ALL RESPONSES TO PHQ QUESTIONS 1-9: 0
SUM OF ALL RESPONSES TO PHQ9 QUESTIONS 1 & 2: 0
SUM OF ALL RESPONSES TO PHQ QUESTIONS 1-9: 0
2. FEELING DOWN, DEPRESSED OR HOPELESS: 0
1. LITTLE INTEREST OR PLEASURE IN DOING THINGS: 0

## 2023-09-11 ENCOUNTER — OFFICE VISIT (OUTPATIENT)
Age: 58
End: 2023-09-11
Payer: COMMERCIAL

## 2023-09-11 VITALS
RESPIRATION RATE: 16 BRPM | WEIGHT: 179.6 LBS | DIASTOLIC BLOOD PRESSURE: 74 MMHG | BODY MASS INDEX: 28.87 KG/M2 | SYSTOLIC BLOOD PRESSURE: 128 MMHG | HEART RATE: 70 BPM | HEIGHT: 66 IN | TEMPERATURE: 98 F | OXYGEN SATURATION: 98 %

## 2023-09-11 DIAGNOSIS — E11.9 CONTROLLED TYPE 2 DIABETES MELLITUS WITHOUT COMPLICATION, WITHOUT LONG-TERM CURRENT USE OF INSULIN (HCC): ICD-10-CM

## 2023-09-11 DIAGNOSIS — E66.3 OVERWEIGHT (BMI 25.0-29.9): Primary | ICD-10-CM

## 2023-09-11 DIAGNOSIS — K21.9 GASTROESOPHAGEAL REFLUX DISEASE WITHOUT ESOPHAGITIS: ICD-10-CM

## 2023-09-11 DIAGNOSIS — G47.33 OSA (OBSTRUCTIVE SLEEP APNEA): ICD-10-CM

## 2023-09-11 DIAGNOSIS — E66.3 OVERWEIGHT (BMI 25.0-29.9): ICD-10-CM

## 2023-09-11 DIAGNOSIS — R10.13 EPIGASTRIC PAIN: ICD-10-CM

## 2023-09-11 PROCEDURE — 3044F HG A1C LEVEL LT 7.0%: CPT | Performed by: FAMILY MEDICINE

## 2023-09-11 PROCEDURE — 99214 OFFICE O/P EST MOD 30 MIN: CPT | Performed by: FAMILY MEDICINE

## 2023-09-11 ASSESSMENT — PATIENT HEALTH QUESTIONNAIRE - PHQ9
SUM OF ALL RESPONSES TO PHQ QUESTIONS 1-9: 0
SUM OF ALL RESPONSES TO PHQ QUESTIONS 1-9: 0
2. FEELING DOWN, DEPRESSED OR HOPELESS: 0
SUM OF ALL RESPONSES TO PHQ QUESTIONS 1-9: 0
SUM OF ALL RESPONSES TO PHQ QUESTIONS 1-9: 0
SUM OF ALL RESPONSES TO PHQ9 QUESTIONS 1 & 2: 0
1. LITTLE INTEREST OR PLEASURE IN DOING THINGS: 0

## 2023-09-13 NOTE — TELEPHONE ENCOUNTER
HSAT performed for reevaluation of sleep disordered breathing following weight reduction. Study demonstrated overall AHI of 3.3/h, minimal SaO2 86%. Snoring during 0.1%. Events observed primarily during the initial half of the study; HSAT potentially underestimated severity of sleep disordered breathing. Recommendation: Attended PSG. Sleep technologist: Please review HSAT results with the patient. Been entered for PSG. X Size Of Lesion In Cm (Optional): 0.4

## 2023-10-11 ENCOUNTER — TELEMEDICINE (OUTPATIENT)
Age: 58
End: 2023-10-11
Payer: COMMERCIAL

## 2023-10-11 VITALS — HEART RATE: 65 BPM | HEIGHT: 66 IN | BODY MASS INDEX: 28.45 KG/M2 | WEIGHT: 177 LBS

## 2023-10-11 DIAGNOSIS — G47.33 OSA (OBSTRUCTIVE SLEEP APNEA): ICD-10-CM

## 2023-10-11 DIAGNOSIS — E66.3 OVERWEIGHT (BMI 25.0-29.9): Primary | ICD-10-CM

## 2023-10-11 DIAGNOSIS — E11.9 CONTROLLED TYPE 2 DIABETES MELLITUS WITHOUT COMPLICATION, WITHOUT LONG-TERM CURRENT USE OF INSULIN (HCC): ICD-10-CM

## 2023-10-11 PROCEDURE — 3044F HG A1C LEVEL LT 7.0%: CPT | Performed by: FAMILY MEDICINE

## 2023-10-11 PROCEDURE — 99214 OFFICE O/P EST MOD 30 MIN: CPT | Performed by: FAMILY MEDICINE

## 2023-10-11 RX ORDER — SEMAGLUTIDE 2.68 MG/ML
2 INJECTION, SOLUTION SUBCUTANEOUS
Qty: 9 ML | Refills: 2 | Status: SHIPPED | OUTPATIENT
Start: 2023-10-11

## 2023-10-13 DIAGNOSIS — E11.9 CONTROLLED TYPE 2 DIABETES MELLITUS WITHOUT COMPLICATION, WITHOUT LONG-TERM CURRENT USE OF INSULIN (HCC): ICD-10-CM

## 2023-10-13 RX ORDER — SEMAGLUTIDE 1.34 MG/ML
0.5 INJECTION, SOLUTION SUBCUTANEOUS
Qty: 1.5 ML | Refills: 0 | Status: SHIPPED | OUTPATIENT
Start: 2023-10-13 | End: 2023-10-15 | Stop reason: SDUPTHER

## 2023-10-13 RX ORDER — SEMAGLUTIDE 1.34 MG/ML
0.5 INJECTION, SOLUTION SUBCUTANEOUS
Qty: 1.5 ML | Refills: 0 | Status: SHIPPED | OUTPATIENT
Start: 2023-10-13 | End: 2023-10-13 | Stop reason: SDUPTHER

## 2023-10-14 NOTE — PROGRESS NOTES
No 2mg semaglutde available.  Only 0.5 mg dose available, I will decrease dose so she does not have to totally go off meds

## 2023-10-15 DIAGNOSIS — E11.9 CONTROLLED TYPE 2 DIABETES MELLITUS WITHOUT COMPLICATION, WITHOUT LONG-TERM CURRENT USE OF INSULIN (HCC): ICD-10-CM

## 2023-10-15 RX ORDER — SEMAGLUTIDE 1.34 MG/ML
0.5 INJECTION, SOLUTION SUBCUTANEOUS
Qty: 3 ML | Refills: 0 | Status: SHIPPED | OUTPATIENT
Start: 2023-10-15

## 2023-11-03 ENCOUNTER — TELEMEDICINE (OUTPATIENT)
Age: 58
End: 2023-11-03
Payer: COMMERCIAL

## 2023-11-03 VITALS — BODY MASS INDEX: 27.97 KG/M2 | HEIGHT: 66 IN | WEIGHT: 174 LBS

## 2023-11-03 DIAGNOSIS — G47.33 OSA (OBSTRUCTIVE SLEEP APNEA): ICD-10-CM

## 2023-11-03 DIAGNOSIS — E11.9 CONTROLLED TYPE 2 DIABETES MELLITUS WITHOUT COMPLICATION, WITHOUT LONG-TERM CURRENT USE OF INSULIN (HCC): ICD-10-CM

## 2023-11-03 DIAGNOSIS — E66.3 OVERWEIGHT (BMI 25.0-29.9): Primary | ICD-10-CM

## 2023-11-03 PROCEDURE — 99214 OFFICE O/P EST MOD 30 MIN: CPT | Performed by: FAMILY MEDICINE

## 2023-11-03 PROCEDURE — 3044F HG A1C LEVEL LT 7.0%: CPT | Performed by: FAMILY MEDICINE

## 2023-11-03 ASSESSMENT — PATIENT HEALTH QUESTIONNAIRE - PHQ9
SUM OF ALL RESPONSES TO PHQ QUESTIONS 1-9: 0
SUM OF ALL RESPONSES TO PHQ QUESTIONS 1-9: 0
1. LITTLE INTEREST OR PLEASURE IN DOING THINGS: 0
SUM OF ALL RESPONSES TO PHQ QUESTIONS 1-9: 0
SUM OF ALL RESPONSES TO PHQ9 QUESTIONS 1 & 2: 0
2. FEELING DOWN, DEPRESSED OR HOPELESS: 0
SUM OF ALL RESPONSES TO PHQ QUESTIONS 1-9: 0

## 2023-11-04 RX ORDER — SEMAGLUTIDE 1.34 MG/ML
0.5 INJECTION, SOLUTION SUBCUTANEOUS
Qty: 3 ML | Refills: 0 | Status: SHIPPED | OUTPATIENT
Start: 2023-11-04

## 2023-11-28 DIAGNOSIS — E11.9 CONTROLLED TYPE 2 DIABETES MELLITUS WITHOUT COMPLICATION, WITHOUT LONG-TERM CURRENT USE OF INSULIN (HCC): Primary | ICD-10-CM

## 2023-11-28 NOTE — PROGRESS NOTES
Requesting rx for the 1 mg dose ozempic. She was able to find a month supply.  She can then go back up to 2 mg

## 2023-12-11 ENCOUNTER — OFFICE VISIT (OUTPATIENT)
Age: 58
End: 2023-12-11
Payer: COMMERCIAL

## 2023-12-11 VITALS
OXYGEN SATURATION: 97 % | HEART RATE: 73 BPM | RESPIRATION RATE: 16 BRPM | SYSTOLIC BLOOD PRESSURE: 127 MMHG | BODY MASS INDEX: 28.51 KG/M2 | DIASTOLIC BLOOD PRESSURE: 81 MMHG | HEIGHT: 66 IN | TEMPERATURE: 97.8 F | WEIGHT: 177.4 LBS

## 2023-12-11 DIAGNOSIS — E11.9 CONTROLLED TYPE 2 DIABETES MELLITUS WITHOUT COMPLICATION, WITHOUT LONG-TERM CURRENT USE OF INSULIN (HCC): ICD-10-CM

## 2023-12-11 DIAGNOSIS — E66.3 OVERWEIGHT (BMI 25.0-29.9): Primary | ICD-10-CM

## 2023-12-11 PROCEDURE — 3044F HG A1C LEVEL LT 7.0%: CPT | Performed by: FAMILY MEDICINE

## 2023-12-11 PROCEDURE — 99214 OFFICE O/P EST MOD 30 MIN: CPT | Performed by: FAMILY MEDICINE

## 2023-12-11 RX ORDER — SEMAGLUTIDE 2.68 MG/ML
2 INJECTION, SOLUTION SUBCUTANEOUS
Qty: 3 ML | Refills: 2 | Status: SHIPPED | OUTPATIENT
Start: 2023-12-11

## 2023-12-11 RX ORDER — SEMAGLUTIDE 0.68 MG/ML
INJECTION, SOLUTION SUBCUTANEOUS
COMMUNITY
Start: 2023-11-07

## 2024-01-12 ENCOUNTER — TELEMEDICINE (OUTPATIENT)
Age: 59
End: 2024-01-12
Payer: COMMERCIAL

## 2024-01-12 ENCOUNTER — TELEPHONE (OUTPATIENT)
Age: 59
End: 2024-01-12

## 2024-01-12 VITALS — HEIGHT: 66 IN | BODY MASS INDEX: 28.12 KG/M2 | WEIGHT: 175 LBS

## 2024-01-12 DIAGNOSIS — E11.9 CONTROLLED TYPE 2 DIABETES MELLITUS WITHOUT COMPLICATION, WITHOUT LONG-TERM CURRENT USE OF INSULIN (HCC): ICD-10-CM

## 2024-01-12 DIAGNOSIS — E66.3 OVERWEIGHT (BMI 25.0-29.9): Primary | ICD-10-CM

## 2024-01-12 PROCEDURE — 99214 OFFICE O/P EST MOD 30 MIN: CPT | Performed by: FAMILY MEDICINE

## 2024-01-12 RX ORDER — SEMAGLUTIDE 2.68 MG/ML
2 INJECTION, SOLUTION SUBCUTANEOUS
Qty: 9 ML | Refills: 1 | Status: SHIPPED | OUTPATIENT
Start: 2024-01-12

## 2024-01-12 RX ORDER — SEMAGLUTIDE 2.68 MG/ML
2 INJECTION, SOLUTION SUBCUTANEOUS
Qty: 9 ML | Refills: 1 | Status: SHIPPED | OUTPATIENT
Start: 2024-01-12 | End: 2024-01-12 | Stop reason: SDUPTHER

## 2024-01-12 ASSESSMENT — PATIENT HEALTH QUESTIONNAIRE - PHQ9
1. LITTLE INTEREST OR PLEASURE IN DOING THINGS: 0
SUM OF ALL RESPONSES TO PHQ QUESTIONS 1-9: 0
SUM OF ALL RESPONSES TO PHQ QUESTIONS 1-9: 0
2. FEELING DOWN, DEPRESSED OR HOPELESS: 0
SUM OF ALL RESPONSES TO PHQ QUESTIONS 1-9: 0
SUM OF ALL RESPONSES TO PHQ9 QUESTIONS 1 & 2: 0
SUM OF ALL RESPONSES TO PHQ QUESTIONS 1-9: 0

## 2024-01-12 NOTE — PROGRESS NOTES
Identified pt with two pt identifiers (name and ). Reviewed chart in preparation for visit and have obtained necessary documentation.    No Don is a 58 y.o. female  Chief Complaint   Patient presents with    Weight Management    Medication Management     Ht 1.676 m (5' 6\")   Wt 79.4 kg (175 lb)   BMI 28.25 kg/m²   No other vitals obtained  1. Have you been to the ER, urgent care clinic since your last visit?  Hospitalized since your last visit?no    2. Have you seen or consulted any other health care providers outside of the [facility] since your last visit?  Include any pap smears or colon screening. no   
daily      Cholecalciferol 50 MCG (2000 UT) TABS Take by mouth every evening      cholestyramine (QUESTRAN) 4 g packet 1 PACKET DISSOLVED IN WATER ONCE A DAY AS NEEDED DIARRHEA (Patient not taking: Reported on 1/12/2024)      pantoprazole (PROTONIX) 40 MG tablet Take by mouth every morning (before breakfast) (Patient not taking: Reported on 7/12/2023)       No current facility-administered medications for this visit.       Waist Circumference: See Weight Management Doc Flowsheet  Neck Circumference: See Weight Management Doc Flowsheet  Percent Body Fat: See Weight Management Doc Flowsheet      1/12/2024     9:30 AM 12/11/2023     2:53 PM 12/11/2023     2:00 PM 11/3/2023     9:30 AM 10/11/2023     2:30 PM 9/11/2023     9:07 AM 9/11/2023     9:00 AM   Weight Metrics   Weight 175 lb 177 lb 6.4 oz  174 lb 177 lb 179 lb 9.6 oz    Neck (Inches)   13 in    13.25 in   Waist Measure Inches   33.5 in    32.5 in   Body Fat %   36.6 %    37.1 %   BMI (Calculated) 28.3 kg/m2 28.7 kg/m2  28.1 kg/m2 28.6 kg/m2 29 kg/m2           No data to display                   Labs: she has not gotten labs drawn yet    Review of Systems  Complete ROS negative except where noted above      Physical Exam    Vital Signs Reviewed  Weight Management Metrics Reviewed    Vital Signs Reviewed  Appearance: Obese, A&O x 3, NAD  HEENT:  NC/AT, EOMI, PERRL, No scleral icterus, malampatti score:  Skin:    Skin tags - no   Acanthosis Nigricans - no  Neck:  No nodes, thyromegaly   Heart:  RRR without M/R/G  Lungs:  CTAB, no rhonchi, rales, or wheezes with good air exchange   Abdomen:  Non-tender, pos bowel sounds; hepatomegaly -   Ext:  No C/C/E        Assessment & Plan  No Valdezjohn was seen today for Weight Management and Medication Management       1. Overweight (BMI 25.0-29.9)  Diet: stay on low carb 1200 rachel plan using E2M plan    Activity: add at least 1 day of resistance continue walking 2-3 days    Medication:  the ozempic for diabetes

## 2024-01-12 NOTE — TELEPHONE ENCOUNTER
Received a fax from [employer] stating that the prescription that was sent over the directions does not work.   Call placed to [facility] spoke with Stephany verified patient using 2 patient identifiers. She states that the script for Ozempic 2mg inject \"0.75 ml into skin\" every 7 days can not be measured. It must come over in mg form.    Will forward information to Provider to resend new prescription for the Ozempic.

## 2024-01-15 DIAGNOSIS — E11.9 CONTROLLED TYPE 2 DIABETES MELLITUS WITHOUT COMPLICATION, WITHOUT LONG-TERM CURRENT USE OF INSULIN (HCC): ICD-10-CM

## 2024-01-15 RX ORDER — SEMAGLUTIDE 2.68 MG/ML
2 INJECTION, SOLUTION SUBCUTANEOUS
Qty: 9 ML | Refills: 1 | Status: SHIPPED | OUTPATIENT
Start: 2024-01-15

## 2024-01-22 ENCOUNTER — CLINICAL DOCUMENTATION (OUTPATIENT)
Age: 59
End: 2024-01-22

## 2024-01-22 NOTE — PROGRESS NOTES
Prior Authorization for Ozempic 2 mg/dose (8 mg/3 ml) DENIED by [facility] for:    Medical Necessity - Hgb A1c greater than or equal to 6.5 percent.     Patient's most recent Hgb A1c (from 6/12/2023) was 5.7    Patient and provider notified.

## 2024-03-29 ENCOUNTER — TELEMEDICINE (OUTPATIENT)
Age: 59
End: 2024-03-29
Payer: COMMERCIAL

## 2024-03-29 VITALS — WEIGHT: 168 LBS | BODY MASS INDEX: 27 KG/M2 | HEIGHT: 66 IN

## 2024-03-29 DIAGNOSIS — E11.9 CONTROLLED TYPE 2 DIABETES MELLITUS WITHOUT COMPLICATION, WITHOUT LONG-TERM CURRENT USE OF INSULIN (HCC): ICD-10-CM

## 2024-03-29 DIAGNOSIS — R19.7 DIARRHEA, UNSPECIFIED TYPE: ICD-10-CM

## 2024-03-29 DIAGNOSIS — E66.3 OVERWEIGHT (BMI 25.0-29.9): Primary | ICD-10-CM

## 2024-03-29 PROCEDURE — 99214 OFFICE O/P EST MOD 30 MIN: CPT | Performed by: FAMILY MEDICINE

## 2024-03-29 ASSESSMENT — PATIENT HEALTH QUESTIONNAIRE - PHQ9
SUM OF ALL RESPONSES TO PHQ QUESTIONS 1-9: 0
1. LITTLE INTEREST OR PLEASURE IN DOING THINGS: NOT AT ALL
SUM OF ALL RESPONSES TO PHQ9 QUESTIONS 1 & 2: 0
SUM OF ALL RESPONSES TO PHQ QUESTIONS 1-9: 0
2. FEELING DOWN, DEPRESSED OR HOPELESS: NOT AT ALL
SUM OF ALL RESPONSES TO PHQ QUESTIONS 1-9: 0
SUM OF ALL RESPONSES TO PHQ QUESTIONS 1-9: 0

## 2024-03-29 NOTE — PROGRESS NOTES
Identified pt with two pt identifiers (name and ). Reviewed chart in preparation for visit and have obtained necessary documentation.    No Don is a 58 y.o. female  Chief Complaint   Patient presents with    Weight Management     1 month    Medication Management     Ht 1.676 m (5' 6\")   Wt 76.2 kg (168 lb)   BMI 27.12 kg/m²     1. Have you been to the ER, urgent care clinic since your last visit?  Hospitalized since your last visit?no    2. Have you seen or consulted any other health care providers outside of the [facility] since your last visit?  Include any pap smears or colon screening. no

## 2024-03-29 NOTE — PROGRESS NOTES
No Don is a 58 y.o. female who was seen by synchronous (real-time) audio-video technology on 3/29/2024.      Consent:  She and/or her healthcare decision maker is aware that this patient-initiated Telehealth encounter is a billable service, with coverage as determined by her insurance carrier. She is aware that she may receive a bill and has provided verbal consent to proceed: Yes    I was at home while conducting this encounter.        No Don is a 58 y.o. female  who is here for her follow up  Evaluation for the medical bariatric care.      CC: I want to be healthier    Weight History  Current weight 168( 175) and BMI is Body mass index is 27.12 kg/m².  Goal weight 165,   Highest weight 182   (See weight gain time line scanned into media section of chart)      Hunger control: good      Significant Medical History    Update on sleep apnea and  CPAP 7    Ever had bariatric surgery: yes sleeve    Pregnant or planning on becoming pregnant w/in 6 months: no         Significant Psychosocial History     Current Major Lifestyle Changes: no  Any potential unsupportive people: no          Social History  Social History     Tobacco Use    Smoking status: Former     Current packs/day: 0.00     Average packs/day: 0.1 packs/day for 0.5 years     Types: Cigarettes     Start date: 2021     Quit date: 2022     Years since quittin.0    Smokeless tobacco: Never   Substance Use Topics    Alcohol use: Yes     How many times a week do you eat out?          Do you drink any EtOH?  once a week   If so, how much?      Do you have upcoming any travel in the next 6 weeks?  no   If so, what do you have planned?          Exercise  How many days a week do you currently exercise?  1 days  Have you ever been told by a physician not to exercise?  no      Objective  Ht 1.676 m (5' 6\")   Wt 76.2 kg (168 lb)   BMI 27.12 kg/m²     Current Outpatient Medications   Medication Sig Dispense Refill

## 2024-05-10 DIAGNOSIS — E11.9 CONTROLLED TYPE 2 DIABETES MELLITUS WITHOUT COMPLICATION, WITHOUT LONG-TERM CURRENT USE OF INSULIN (HCC): ICD-10-CM

## 2024-05-10 RX ORDER — SEMAGLUTIDE 2.68 MG/ML
2 INJECTION, SOLUTION SUBCUTANEOUS
Qty: 9 ML | Refills: 1 | Status: SHIPPED | OUTPATIENT
Start: 2024-05-10

## 2024-05-10 NOTE — PROGRESS NOTES
Having trouble getting refill. I resent the ozempic rx , it says will need PA. I explained it will take a few days  DB

## 2024-05-15 ENCOUNTER — CLINICAL DOCUMENTATION (OUTPATIENT)
Age: 59
End: 2024-05-15

## 2024-05-15 NOTE — PROGRESS NOTES
Prior Authorization for Ozempic APPROVED by [facility].    5/14/2024 - 5/14/2027    PA# [facility] - Office of Employee Benefits [id number]     Patient notified.

## 2024-11-10 NOTE — PROGRESS NOTES
TRANSFER - IN REPORT:    Verbal report received from [facility] RN(name) on [employer]  being received from PACU(unit) for routine progression of care      Report consisted of patients Situation, Background, Assessment and   Recommendations(SBAR). Information from the following report(s) SBAR, Kardex, Intake/Output and MAR was reviewed with the receiving nurse. Opportunity for questions and clarification was provided. Assessment completed upon patients arrival to unit and care assumed. Dr. Cortez

## 2025-01-21 DIAGNOSIS — E11.9 CONTROLLED TYPE 2 DIABETES MELLITUS WITHOUT COMPLICATION, WITHOUT LONG-TERM CURRENT USE OF INSULIN: ICD-10-CM

## 2025-01-21 RX ORDER — SEMAGLUTIDE 2.68 MG/ML
INJECTION, SOLUTION SUBCUTANEOUS
Refills: 1 | OUTPATIENT
Start: 2025-01-21

## 2025-01-24 ENCOUNTER — APPOINTMENT (OUTPATIENT)
Facility: HOSPITAL | Age: 60
End: 2025-01-24
Payer: COMMERCIAL

## 2025-01-24 ENCOUNTER — HOSPITAL ENCOUNTER (OUTPATIENT)
Facility: HOSPITAL | Age: 60
Setting detail: OBSERVATION
Discharge: HOME | End: 2025-01-25
Attending: STUDENT IN AN ORGANIZED HEALTH CARE EDUCATION/TRAINING PROGRAM | Admitting: HOSPITALIST
Payer: COMMERCIAL

## 2025-01-24 DIAGNOSIS — R11.0 NAUSEA: ICD-10-CM

## 2025-01-24 DIAGNOSIS — R07.9 ACUTE CHEST PAIN: Primary | ICD-10-CM

## 2025-01-24 DIAGNOSIS — R07.9 CHEST PAIN, UNSPECIFIED TYPE: ICD-10-CM

## 2025-01-24 LAB
ALBUMIN SERPL-MCNC: 3.7 G/DL (ref 3.5–5)
ALBUMIN/GLOB SERPL: 1 (ref 1.1–2.2)
ALP SERPL-CCNC: 84 U/L (ref 45–117)
ALT SERPL-CCNC: 22 U/L (ref 12–78)
ANION GAP SERPL CALC-SCNC: 6 MMOL/L (ref 2–12)
AST SERPL-CCNC: 12 U/L (ref 15–37)
BASOPHILS # BLD: 0.02 K/UL (ref 0–0.1)
BASOPHILS NFR BLD: 0.2 % (ref 0–1)
BILIRUB SERPL-MCNC: 0.2 MG/DL (ref 0.2–1)
BUN SERPL-MCNC: 19 MG/DL (ref 6–20)
BUN/CREAT SERPL: 21 (ref 12–20)
CALCIUM SERPL-MCNC: 9.3 MG/DL (ref 8.5–10.1)
CHLORIDE SERPL-SCNC: 104 MMOL/L (ref 97–108)
CO2 SERPL-SCNC: 28 MMOL/L (ref 21–32)
CREAT SERPL-MCNC: 0.89 MG/DL (ref 0.55–1.02)
D DIMER PPP FEU-MCNC: <0.19 MG/L FEU (ref 0–0.65)
DIFFERENTIAL METHOD BLD: ABNORMAL
EOSINOPHIL # BLD: 0.09 K/UL (ref 0–0.4)
EOSINOPHIL NFR BLD: 1 % (ref 0–7)
ERYTHROCYTE [DISTWIDTH] IN BLOOD BY AUTOMATED COUNT: 12.8 % (ref 11.5–14.5)
GLOBULIN SER CALC-MCNC: 3.8 G/DL (ref 2–4)
GLUCOSE SERPL-MCNC: 102 MG/DL (ref 65–100)
HCT VFR BLD AUTO: 38.7 % (ref 35–47)
HGB BLD-MCNC: 12.9 G/DL (ref 11.5–16)
IMM GRANULOCYTES # BLD AUTO: 0.02 K/UL (ref 0–0.04)
IMM GRANULOCYTES NFR BLD AUTO: 0.2 % (ref 0–0.5)
LIPASE SERPL-CCNC: 33 U/L (ref 13–75)
LYMPHOCYTES # BLD: 1.88 K/UL (ref 0.8–3.5)
LYMPHOCYTES NFR BLD: 21.4 % (ref 12–49)
MCH RBC QN AUTO: 30.3 PG (ref 26–34)
MCHC RBC AUTO-ENTMCNC: 33.3 G/DL (ref 30–36.5)
MCV RBC AUTO: 90.8 FL (ref 80–99)
MONOCYTES # BLD: 0.43 K/UL (ref 0–1)
MONOCYTES NFR BLD: 4.9 % (ref 5–13)
NEUTS SEG # BLD: 6.35 K/UL (ref 1.8–8)
NEUTS SEG NFR BLD: 72.3 % (ref 32–75)
NRBC # BLD: 0 K/UL (ref 0–0.01)
NRBC BLD-RTO: 0 PER 100 WBC
PLATELET # BLD AUTO: 271 K/UL (ref 150–400)
PMV BLD AUTO: 9.9 FL (ref 8.9–12.9)
POTASSIUM SERPL-SCNC: 4.3 MMOL/L (ref 3.5–5.1)
PROT SERPL-MCNC: 7.5 G/DL (ref 6.4–8.2)
RBC # BLD AUTO: 4.26 M/UL (ref 3.8–5.2)
SODIUM SERPL-SCNC: 138 MMOL/L (ref 136–145)
TROPONIN I SERPL HS-MCNC: 73 NG/L (ref 0–51)
TROPONIN I SERPL HS-MCNC: 78 NG/L (ref 0–51)
WBC # BLD AUTO: 8.8 K/UL (ref 3.6–11)

## 2025-01-24 PROCEDURE — G0378 HOSPITAL OBSERVATION PER HR: HCPCS

## 2025-01-24 PROCEDURE — 83690 ASSAY OF LIPASE: CPT

## 2025-01-24 PROCEDURE — 85025 COMPLETE CBC W/AUTO DIFF WBC: CPT

## 2025-01-24 PROCEDURE — 74177 CT ABD & PELVIS W/CONTRAST: CPT

## 2025-01-24 PROCEDURE — 6370000000 HC RX 637 (ALT 250 FOR IP): Performed by: STUDENT IN AN ORGANIZED HEALTH CARE EDUCATION/TRAINING PROGRAM

## 2025-01-24 PROCEDURE — 84484 ASSAY OF TROPONIN QUANT: CPT

## 2025-01-24 PROCEDURE — 96374 THER/PROPH/DIAG INJ IV PUSH: CPT

## 2025-01-24 PROCEDURE — 99285 EMERGENCY DEPT VISIT HI MDM: CPT

## 2025-01-24 PROCEDURE — 85379 FIBRIN DEGRADATION QUANT: CPT

## 2025-01-24 PROCEDURE — 93005 ELECTROCARDIOGRAM TRACING: CPT | Performed by: STUDENT IN AN ORGANIZED HEALTH CARE EDUCATION/TRAINING PROGRAM

## 2025-01-24 PROCEDURE — 71275 CT ANGIOGRAPHY CHEST: CPT

## 2025-01-24 PROCEDURE — 80053 COMPREHEN METABOLIC PANEL: CPT

## 2025-01-24 PROCEDURE — 2500000003 HC RX 250 WO HCPCS: Performed by: STUDENT IN AN ORGANIZED HEALTH CARE EDUCATION/TRAINING PROGRAM

## 2025-01-24 PROCEDURE — 71046 X-RAY EXAM CHEST 2 VIEWS: CPT

## 2025-01-24 PROCEDURE — 6360000004 HC RX CONTRAST MEDICATION: Performed by: STUDENT IN AN ORGANIZED HEALTH CARE EDUCATION/TRAINING PROGRAM

## 2025-01-24 PROCEDURE — 36415 COLL VENOUS BLD VENIPUNCTURE: CPT

## 2025-01-24 PROCEDURE — 2580000003 HC RX 258: Performed by: STUDENT IN AN ORGANIZED HEALTH CARE EDUCATION/TRAINING PROGRAM

## 2025-01-24 RX ORDER — SODIUM CHLORIDE 0.9 % (FLUSH) 0.9 %
5-40 SYRINGE (ML) INJECTION PRN
Status: DISCONTINUED | OUTPATIENT
Start: 2025-01-24 | End: 2025-01-25 | Stop reason: HOSPADM

## 2025-01-24 RX ORDER — SUCRALFATE 1 G/1
1 TABLET ORAL ONCE
Status: COMPLETED | OUTPATIENT
Start: 2025-01-24 | End: 2025-01-24

## 2025-01-24 RX ORDER — ASPIRIN 325 MG
325 TABLET ORAL
Status: COMPLETED | OUTPATIENT
Start: 2025-01-24 | End: 2025-01-24

## 2025-01-24 RX ORDER — ACETAMINOPHEN 325 MG/1
650 TABLET ORAL EVERY 6 HOURS PRN
Status: DISCONTINUED | OUTPATIENT
Start: 2025-01-24 | End: 2025-01-25 | Stop reason: HOSPADM

## 2025-01-24 RX ORDER — SODIUM CHLORIDE 9 MG/ML
INJECTION, SOLUTION INTRAVENOUS PRN
Status: DISCONTINUED | OUTPATIENT
Start: 2025-01-24 | End: 2025-01-25 | Stop reason: HOSPADM

## 2025-01-24 RX ORDER — POTASSIUM CHLORIDE 750 MG/1
40 TABLET, EXTENDED RELEASE ORAL PRN
Status: DISCONTINUED | OUTPATIENT
Start: 2025-01-24 | End: 2025-01-25 | Stop reason: HOSPADM

## 2025-01-24 RX ORDER — MAGNESIUM SULFATE IN WATER 40 MG/ML
2000 INJECTION, SOLUTION INTRAVENOUS PRN
Status: DISCONTINUED | OUTPATIENT
Start: 2025-01-24 | End: 2025-01-25 | Stop reason: HOSPADM

## 2025-01-24 RX ORDER — ENOXAPARIN SODIUM 100 MG/ML
40 INJECTION SUBCUTANEOUS DAILY
Status: DISCONTINUED | OUTPATIENT
Start: 2025-01-25 | End: 2025-01-25 | Stop reason: HOSPADM

## 2025-01-24 RX ORDER — ONDANSETRON 2 MG/ML
4 INJECTION INTRAMUSCULAR; INTRAVENOUS ONCE
Status: DISCONTINUED | OUTPATIENT
Start: 2025-01-24 | End: 2025-01-25 | Stop reason: HOSPADM

## 2025-01-24 RX ORDER — ONDANSETRON 4 MG/1
4 TABLET, ORALLY DISINTEGRATING ORAL EVERY 8 HOURS PRN
Status: DISCONTINUED | OUTPATIENT
Start: 2025-01-24 | End: 2025-01-25 | Stop reason: HOSPADM

## 2025-01-24 RX ORDER — SODIUM CHLORIDE 0.9 % (FLUSH) 0.9 %
5-40 SYRINGE (ML) INJECTION EVERY 12 HOURS SCHEDULED
Status: DISCONTINUED | OUTPATIENT
Start: 2025-01-24 | End: 2025-01-25 | Stop reason: HOSPADM

## 2025-01-24 RX ORDER — ONDANSETRON 2 MG/ML
4 INJECTION INTRAMUSCULAR; INTRAVENOUS EVERY 6 HOURS PRN
Status: DISCONTINUED | OUTPATIENT
Start: 2025-01-24 | End: 2025-01-25 | Stop reason: HOSPADM

## 2025-01-24 RX ORDER — POTASSIUM CHLORIDE 7.45 MG/ML
10 INJECTION INTRAVENOUS PRN
Status: DISCONTINUED | OUTPATIENT
Start: 2025-01-24 | End: 2025-01-25 | Stop reason: HOSPADM

## 2025-01-24 RX ORDER — ACETAMINOPHEN 650 MG/1
650 SUPPOSITORY RECTAL EVERY 6 HOURS PRN
Status: DISCONTINUED | OUTPATIENT
Start: 2025-01-24 | End: 2025-01-25 | Stop reason: HOSPADM

## 2025-01-24 RX ORDER — POLYETHYLENE GLYCOL 3350 17 G/17G
17 POWDER, FOR SOLUTION ORAL DAILY PRN
Status: DISCONTINUED | OUTPATIENT
Start: 2025-01-24 | End: 2025-01-25 | Stop reason: HOSPADM

## 2025-01-24 RX ORDER — IOPAMIDOL 755 MG/ML
100 INJECTION, SOLUTION INTRAVASCULAR
Status: COMPLETED | OUTPATIENT
Start: 2025-01-24 | End: 2025-01-24

## 2025-01-24 RX ADMIN — IOPAMIDOL 100 ML: 755 INJECTION, SOLUTION INTRAVENOUS at 18:11

## 2025-01-24 RX ADMIN — LIDOCAINE HYDROCHLORIDE 40 ML: 20 SOLUTION ORAL at 20:49

## 2025-01-24 RX ADMIN — SUCRALFATE 1 G: 1 TABLET ORAL at 19:19

## 2025-01-24 RX ADMIN — ASPIRIN 325 MG: 325 TABLET ORAL at 19:19

## 2025-01-24 RX ADMIN — FAMOTIDINE 20 MG: 10 INJECTION, SOLUTION INTRAVENOUS at 18:21

## 2025-01-24 ASSESSMENT — PAIN - FUNCTIONAL ASSESSMENT
PAIN_FUNCTIONAL_ASSESSMENT: ACTIVITIES ARE NOT PREVENTED
PAIN_FUNCTIONAL_ASSESSMENT: ACTIVITIES ARE NOT PREVENTED

## 2025-01-24 ASSESSMENT — PAIN DESCRIPTION - ORIENTATION
ORIENTATION: UPPER
ORIENTATION: MID

## 2025-01-24 ASSESSMENT — PAIN DESCRIPTION - FREQUENCY: FREQUENCY: CONTINUOUS

## 2025-01-24 ASSESSMENT — PAIN DESCRIPTION - DESCRIPTORS
DESCRIPTORS: SHARP;PRESSURE;TIGHTNESS
DESCRIPTORS: DISCOMFORT

## 2025-01-24 ASSESSMENT — PAIN DESCRIPTION - LOCATION
LOCATION: CHEST;ABDOMEN
LOCATION: CHEST

## 2025-01-24 ASSESSMENT — PAIN DESCRIPTION - PAIN TYPE: TYPE: ACUTE PAIN

## 2025-01-24 ASSESSMENT — PAIN SCALES - GENERAL
PAINLEVEL_OUTOF10: 3
PAINLEVEL_OUTOF10: 5

## 2025-01-24 ASSESSMENT — HEART SCORE: ECG: NON-SPECIFC REPOLARIZATION DISTURBANCE/LBTB/PM

## 2025-01-24 NOTE — ED TRIAGE NOTES
Pt ambulated to the treatment area with a steady gait. Pt states \"I started having chest pain this morning at 8am with mid chest pain with nausea now its getting worse and goes strait through to my back I have a history of strain on my heart.\"

## 2025-01-25 ENCOUNTER — APPOINTMENT (OUTPATIENT)
Facility: HOSPITAL | Age: 60
End: 2025-01-25
Attending: HOSPITALIST
Payer: COMMERCIAL

## 2025-01-25 ENCOUNTER — APPOINTMENT (OUTPATIENT)
Facility: HOSPITAL | Age: 60
End: 2025-01-25
Payer: COMMERCIAL

## 2025-01-25 VITALS
BODY MASS INDEX: 27.8 KG/M2 | OXYGEN SATURATION: 98 % | HEIGHT: 66 IN | WEIGHT: 173 LBS | RESPIRATION RATE: 16 BRPM | SYSTOLIC BLOOD PRESSURE: 127 MMHG | TEMPERATURE: 98.2 F | HEART RATE: 61 BPM | DIASTOLIC BLOOD PRESSURE: 85 MMHG

## 2025-01-25 LAB
ANION GAP SERPL CALC-SCNC: 3 MMOL/L (ref 2–12)
BASOPHILS # BLD: 0.02 K/UL (ref 0–0.1)
BASOPHILS NFR BLD: 0.3 % (ref 0–1)
BUN SERPL-MCNC: 15 MG/DL (ref 6–20)
BUN/CREAT SERPL: 19 (ref 12–20)
CALCIUM SERPL-MCNC: 9.2 MG/DL (ref 8.5–10.1)
CHLORIDE SERPL-SCNC: 106 MMOL/L (ref 97–108)
CHOLEST SERPL-MCNC: 204 MG/DL
CO2 SERPL-SCNC: 31 MMOL/L (ref 21–32)
CREAT SERPL-MCNC: 0.81 MG/DL (ref 0.55–1.02)
DIFFERENTIAL METHOD BLD: NORMAL
ECHO AO ARCH DIAM: 2 CM
ECHO AO ASC DIAM: 2.7 CM
ECHO AO ASCENDING AORTA INDEX: 1.44 CM/M2
ECHO AO ROOT DIAM: 3.4 CM
ECHO AO ROOT INDEX: 1.81 CM/M2
ECHO AV AREA PEAK VELOCITY: 2.6 CM2
ECHO AV AREA PEAK VELOCITY: 2.6 CM2
ECHO AV AREA VTI: 2.5 CM2
ECHO AV AREA/BSA VTI: 1.3 CM2/M2
ECHO AV MEAN GRADIENT: 2 MMHG
ECHO AV MEAN VELOCITY: 0.7 M/S
ECHO AV PEAK GRADIENT: 4 MMHG
ECHO AV PEAK GRADIENT: 5 MMHG
ECHO AV PEAK VELOCITY: 1.1 M/S
ECHO AV PEAK VELOCITY: 1.1 M/S
ECHO AV VTI: 22.4 CM
ECHO BSA: 1.91 M2
ECHO LA DIAMETER INDEX: 1.54 CM/M2
ECHO LA DIAMETER: 2.9 CM
ECHO LA TO AORTIC ROOT RATIO: 0.85
ECHO LA VOL A-L A2C: 28 ML (ref 22–52)
ECHO LA VOL A-L A4C: 24 ML (ref 22–52)
ECHO LA VOL BP: 23 ML (ref 22–52)
ECHO LA VOL MOD A2C: 26 ML (ref 22–52)
ECHO LA VOL MOD A4C: 20 ML (ref 22–52)
ECHO LA VOL/BSA BIPLANE: 12 ML/M2 (ref 16–34)
ECHO LA VOLUME AREA LENGTH: 26 ML
ECHO LA VOLUME INDEX A-L A2C: 15 ML/M2 (ref 16–34)
ECHO LA VOLUME INDEX A-L A4C: 13 ML/M2 (ref 16–34)
ECHO LA VOLUME INDEX AREA LENGTH: 14 ML/M2 (ref 16–34)
ECHO LA VOLUME INDEX MOD A2C: 14 ML/M2 (ref 16–34)
ECHO LA VOLUME INDEX MOD A4C: 11 ML/M2 (ref 16–34)
ECHO LV E' LATERAL VELOCITY: 10.12 CM/S
ECHO LV E' SEPTAL VELOCITY: 8.03 CM/S
ECHO LV EDV A2C: 57 ML
ECHO LV EDV A4C: 66 ML
ECHO LV EDV BP: 65 ML (ref 56–104)
ECHO LV EDV INDEX A4C: 35 ML/M2
ECHO LV EDV INDEX BP: 35 ML/M2
ECHO LV EDV NDEX A2C: 30 ML/M2
ECHO LV EJECTION FRACTION A2C: 59 %
ECHO LV EJECTION FRACTION A4C: 54 %
ECHO LV EJECTION FRACTION BIPLANE: 57 % (ref 55–100)
ECHO LV ESV A2C: 23 ML
ECHO LV ESV A4C: 30 ML
ECHO LV ESV BP: 28 ML (ref 19–49)
ECHO LV ESV INDEX A2C: 12 ML/M2
ECHO LV ESV INDEX A4C: 16 ML/M2
ECHO LV ESV INDEX BP: 15 ML/M2
ECHO LV FRACTIONAL SHORTENING: 33 % (ref 28–44)
ECHO LV INTERNAL DIMENSION DIASTOLE INDEX: 2.55 CM/M2
ECHO LV INTERNAL DIMENSION DIASTOLIC: 4.8 CM (ref 3.9–5.3)
ECHO LV INTERNAL DIMENSION SYSTOLIC INDEX: 1.7 CM/M2
ECHO LV INTERNAL DIMENSION SYSTOLIC: 3.2 CM
ECHO LV IVSD: 0.7 CM (ref 0.6–0.9)
ECHO LV MASS 2D: 106.9 G (ref 67–162)
ECHO LV MASS INDEX 2D: 56.8 G/M2 (ref 43–95)
ECHO LV POSTERIOR WALL DIASTOLIC: 0.7 CM (ref 0.6–0.9)
ECHO LV RELATIVE WALL THICKNESS RATIO: 0.29
ECHO LVOT AREA: 3.5 CM2
ECHO LVOT AV VTI INDEX: 0.71
ECHO LVOT DIAM: 2.1 CM
ECHO LVOT MEAN GRADIENT: 2 MMHG
ECHO LVOT PEAK GRADIENT: 3 MMHG
ECHO LVOT PEAK VELOCITY: 0.8 M/S
ECHO LVOT STROKE VOLUME INDEX: 29.5 ML/M2
ECHO LVOT SV: 55.4 ML
ECHO LVOT VTI: 16 CM
ECHO MV A VELOCITY: 0.31 M/S
ECHO MV E DECELERATION TIME (DT): 303.7 MS
ECHO MV E VELOCITY: 0.46 M/S
ECHO MV E/A RATIO: 1.48
ECHO MV E/E' LATERAL: 4.55
ECHO MV E/E' RATIO (AVERAGED): 5.14
ECHO MV E/E' SEPTAL: 5.73
ECHO MV REGURGITANT PEAK GRADIENT: 71 MMHG
ECHO MV REGURGITANT PEAK VELOCITY: 4.2 M/S
ECHO PULMONARY ARTERY END DIASTOLIC PRESSURE: 1 MMHG
ECHO PV MAX VELOCITY: 0.7 M/S
ECHO PV PEAK GRADIENT: 2 MMHG
ECHO PV REGURGITANT MAX VELOCITY: 0.5 M/S
ECHO RV FREE WALL PEAK S': 9.1 CM/S
ECHO RV INTERNAL DIMENSION: 2.9 CM
ECHO RV TAPSE: 1.4 CM (ref 1.7–?)
ECHO TV REGURGITANT MAX VELOCITY: 2.21 M/S
ECHO TV REGURGITANT PEAK GRADIENT: 20 MMHG
EKG ATRIAL RATE: 74 BPM
EKG DIAGNOSIS: NORMAL
EKG P AXIS: 59 DEGREES
EKG P-R INTERVAL: 146 MS
EKG Q-T INTERVAL: 386 MS
EKG QRS DURATION: 100 MS
EKG QTC CALCULATION (BAZETT): 428 MS
EKG R AXIS: 49 DEGREES
EKG T AXIS: 53 DEGREES
EKG VENTRICULAR RATE: 74 BPM
EOSINOPHIL # BLD: 0.13 K/UL (ref 0–0.4)
EOSINOPHIL NFR BLD: 1.9 % (ref 0–7)
ERYTHROCYTE [DISTWIDTH] IN BLOOD BY AUTOMATED COUNT: 12.7 % (ref 11.5–14.5)
EST. AVERAGE GLUCOSE BLD GHB EST-MCNC: 111 MG/DL
GLUCOSE BLD STRIP.AUTO-MCNC: 149 MG/DL (ref 65–117)
GLUCOSE BLD STRIP.AUTO-MCNC: 86 MG/DL (ref 65–117)
GLUCOSE BLD STRIP.AUTO-MCNC: 90 MG/DL (ref 65–117)
GLUCOSE SERPL-MCNC: 109 MG/DL (ref 65–100)
HBA1C MFR BLD: 5.5 % (ref 4–5.6)
HCT VFR BLD AUTO: 40.3 % (ref 35–47)
HDLC SERPL-MCNC: 81 MG/DL
HDLC SERPL: 2.5 (ref 0–5)
HGB BLD-MCNC: 12.8 G/DL (ref 11.5–16)
IMM GRANULOCYTES # BLD AUTO: 0.02 K/UL (ref 0–0.04)
IMM GRANULOCYTES NFR BLD AUTO: 0.3 % (ref 0–0.5)
LDLC SERPL CALC-MCNC: 105.4 MG/DL (ref 0–100)
LYMPHOCYTES # BLD: 2.17 K/UL (ref 0.8–3.5)
LYMPHOCYTES NFR BLD: 31.7 % (ref 12–49)
MCH RBC QN AUTO: 29.2 PG (ref 26–34)
MCHC RBC AUTO-ENTMCNC: 31.8 G/DL (ref 30–36.5)
MCV RBC AUTO: 91.8 FL (ref 80–99)
MONOCYTES # BLD: 0.37 K/UL (ref 0–1)
MONOCYTES NFR BLD: 5.4 % (ref 5–13)
NEUTS SEG # BLD: 4.13 K/UL (ref 1.8–8)
NEUTS SEG NFR BLD: 60.4 % (ref 32–75)
NRBC # BLD: 0 K/UL (ref 0–0.01)
NRBC BLD-RTO: 0 PER 100 WBC
PLATELET # BLD AUTO: 281 K/UL (ref 150–400)
PMV BLD AUTO: 9.8 FL (ref 8.9–12.9)
POTASSIUM SERPL-SCNC: 4.3 MMOL/L (ref 3.5–5.1)
RBC # BLD AUTO: 4.39 M/UL (ref 3.8–5.2)
SERVICE CMNT-IMP: ABNORMAL
SERVICE CMNT-IMP: NORMAL
SERVICE CMNT-IMP: NORMAL
SODIUM SERPL-SCNC: 140 MMOL/L (ref 136–145)
TRIGL SERPL-MCNC: 88 MG/DL
TROPONIN I SERPL HS-MCNC: 71 NG/L (ref 0–51)
TROPONIN I SERPL HS-MCNC: 72 NG/L (ref 0–51)
VLDLC SERPL CALC-MCNC: 17.6 MG/DL
WBC # BLD AUTO: 6.8 K/UL (ref 3.6–11)

## 2025-01-25 PROCEDURE — 83036 HEMOGLOBIN GLYCOSYLATED A1C: CPT

## 2025-01-25 PROCEDURE — 80061 LIPID PANEL: CPT

## 2025-01-25 PROCEDURE — 84484 ASSAY OF TROPONIN QUANT: CPT

## 2025-01-25 PROCEDURE — G0378 HOSPITAL OBSERVATION PER HR: HCPCS

## 2025-01-25 PROCEDURE — 2500000003 HC RX 250 WO HCPCS: Performed by: HOSPITALIST

## 2025-01-25 PROCEDURE — 6370000000 HC RX 637 (ALT 250 FOR IP): Performed by: HOSPITALIST

## 2025-01-25 PROCEDURE — 96372 THER/PROPH/DIAG INJ SC/IM: CPT

## 2025-01-25 PROCEDURE — 6360000002 HC RX W HCPCS: Performed by: HOSPITALIST

## 2025-01-25 PROCEDURE — 93306 TTE W/DOPPLER COMPLETE: CPT

## 2025-01-25 PROCEDURE — 85025 COMPLETE CBC W/AUTO DIFF WBC: CPT

## 2025-01-25 PROCEDURE — 93010 ELECTROCARDIOGRAM REPORT: CPT | Performed by: SPECIALIST

## 2025-01-25 PROCEDURE — 99223 1ST HOSP IP/OBS HIGH 75: CPT | Performed by: INTERNAL MEDICINE

## 2025-01-25 PROCEDURE — 75574 CT ANGIO HRT W/3D IMAGE: CPT

## 2025-01-25 PROCEDURE — 82962 GLUCOSE BLOOD TEST: CPT

## 2025-01-25 PROCEDURE — 93306 TTE W/DOPPLER COMPLETE: CPT | Performed by: INTERNAL MEDICINE

## 2025-01-25 PROCEDURE — 94761 N-INVAS EAR/PLS OXIMETRY MLT: CPT

## 2025-01-25 PROCEDURE — 6370000000 HC RX 637 (ALT 250 FOR IP): Performed by: INTERNAL MEDICINE

## 2025-01-25 PROCEDURE — 6360000004 HC RX CONTRAST MEDICATION: Performed by: INTERNAL MEDICINE

## 2025-01-25 PROCEDURE — 36415 COLL VENOUS BLD VENIPUNCTURE: CPT

## 2025-01-25 PROCEDURE — 80048 BASIC METABOLIC PNL TOTAL CA: CPT

## 2025-01-25 RX ORDER — FAMOTIDINE 20 MG/1
20 TABLET, FILM COATED ORAL 2 TIMES DAILY PRN
Qty: 60 TABLET | Refills: 3 | Status: SHIPPED | OUTPATIENT
Start: 2025-01-25

## 2025-01-25 RX ORDER — NITROGLYCERIN 0.4 MG/1
0.8 TABLET SUBLINGUAL ONCE
Status: COMPLETED | OUTPATIENT
Start: 2025-01-25 | End: 2025-01-25

## 2025-01-25 RX ORDER — ASPIRIN 81 MG/1
81 TABLET ORAL DAILY
Status: DISCONTINUED | OUTPATIENT
Start: 2025-01-25 | End: 2025-01-25 | Stop reason: HOSPADM

## 2025-01-25 RX ORDER — ATORVASTATIN CALCIUM 20 MG/1
20 TABLET, FILM COATED ORAL NIGHTLY
Status: DISCONTINUED | OUTPATIENT
Start: 2025-01-25 | End: 2025-01-25 | Stop reason: HOSPADM

## 2025-01-25 RX ORDER — ISOSORBIDE MONONITRATE 30 MG/1
30 TABLET, EXTENDED RELEASE ORAL DAILY
Status: DISCONTINUED | OUTPATIENT
Start: 2025-01-25 | End: 2025-01-25 | Stop reason: HOSPADM

## 2025-01-25 RX ORDER — CALCIUM CARBONATE 500 MG/1
500 TABLET, CHEWABLE ORAL 3 TIMES DAILY PRN
Status: DISCONTINUED | OUTPATIENT
Start: 2025-01-25 | End: 2025-01-25 | Stop reason: HOSPADM

## 2025-01-25 RX ORDER — INSULIN LISPRO 100 [IU]/ML
0-8 INJECTION, SOLUTION INTRAVENOUS; SUBCUTANEOUS
Status: DISCONTINUED | OUTPATIENT
Start: 2025-01-25 | End: 2025-01-25

## 2025-01-25 RX ORDER — DEXTROSE MONOHYDRATE 100 MG/ML
INJECTION, SOLUTION INTRAVENOUS CONTINUOUS PRN
Status: DISCONTINUED | OUTPATIENT
Start: 2025-01-25 | End: 2025-01-25 | Stop reason: HOSPADM

## 2025-01-25 RX ORDER — IOPAMIDOL 755 MG/ML
100 INJECTION, SOLUTION INTRAVASCULAR
Status: COMPLETED | OUTPATIENT
Start: 2025-01-25 | End: 2025-01-25

## 2025-01-25 RX ORDER — PANTOPRAZOLE SODIUM 40 MG/1
40 TABLET, DELAYED RELEASE ORAL
Qty: 14 TABLET | Refills: 0 | Status: SHIPPED | OUTPATIENT
Start: 2025-01-25 | End: 2025-02-08

## 2025-01-25 RX ORDER — ISOSORBIDE MONONITRATE 30 MG/1
30 TABLET, EXTENDED RELEASE ORAL DAILY
Qty: 30 TABLET | Refills: 3 | Status: SHIPPED | OUTPATIENT
Start: 2025-01-25

## 2025-01-25 RX ORDER — ATENOLOL 50 MG/1
25 TABLET ORAL ONCE
Status: COMPLETED | OUTPATIENT
Start: 2025-01-25 | End: 2025-01-25

## 2025-01-25 RX ORDER — ATORVASTATIN CALCIUM 20 MG/1
20 TABLET, FILM COATED ORAL NIGHTLY
Qty: 30 TABLET | Refills: 3 | Status: SHIPPED | OUTPATIENT
Start: 2025-01-25

## 2025-01-25 RX ORDER — CALCIUM CARBONATE 500 MG/1
500 TABLET, CHEWABLE ORAL 3 TIMES DAILY PRN
Qty: 30 TABLET | Refills: 0 | Status: SHIPPED | OUTPATIENT
Start: 2025-01-25 | End: 2025-02-24

## 2025-01-25 RX ORDER — PANTOPRAZOLE SODIUM 40 MG/1
40 TABLET, DELAYED RELEASE ORAL
Status: DISCONTINUED | OUTPATIENT
Start: 2025-01-25 | End: 2025-01-25 | Stop reason: HOSPADM

## 2025-01-25 RX ADMIN — ASPIRIN 81 MG: 81 TABLET, COATED ORAL at 09:18

## 2025-01-25 RX ADMIN — PANTOPRAZOLE SODIUM 40 MG: 40 TABLET, DELAYED RELEASE ORAL at 06:15

## 2025-01-25 RX ADMIN — ENOXAPARIN SODIUM 40 MG: 100 INJECTION SUBCUTANEOUS at 09:18

## 2025-01-25 RX ADMIN — SODIUM CHLORIDE, PRESERVATIVE FREE 10 ML: 5 INJECTION INTRAVENOUS at 00:27

## 2025-01-25 RX ADMIN — ISOSORBIDE MONONITRATE 30 MG: 30 TABLET, EXTENDED RELEASE ORAL at 16:54

## 2025-01-25 RX ADMIN — IOPAMIDOL 80 ML: 755 INJECTION, SOLUTION INTRAVENOUS at 13:12

## 2025-01-25 RX ADMIN — NITROGLYCERIN 0.8 MG: 0.4 TABLET SUBLINGUAL at 13:14

## 2025-01-25 RX ADMIN — ATENOLOL 25 MG: 50 TABLET ORAL at 12:41

## 2025-01-25 RX ADMIN — SODIUM CHLORIDE, PRESERVATIVE FREE 10 ML: 5 INJECTION INTRAVENOUS at 09:18

## 2025-01-25 ASSESSMENT — PAIN DESCRIPTION - LOCATION: LOCATION: CHEST

## 2025-01-25 ASSESSMENT — PAIN SCALES - GENERAL: PAINLEVEL_OUTOF10: 4

## 2025-01-25 NOTE — PROGRESS NOTES
[facility]  [address] [place] [address] [phone]        Hospitalist Progress Note      NAME: No Don   :  1965  MRM:  [id number]    Date/Time of service: 2025  2:32 PM       Subjective:     Chief Complaint:  Patient was personally seen and examined by me during this time period.  Chart reviewed.     Pt was seen this morning resting in bed. Extensive conversation w pt re: the history of her chest pain which has since resolved. She reports she has been seen at [facility] ED in past for similar sx. Reports she was told she has RBBB which may have been the cause of her pain. States the onset of the pain began ~1w ago and radiates to her R arm. She states she believed it was MSK at first as it only occurred w mvmt of her R arm. She does endorse associated SOB w the pain & with exertion and associated belching. Endorses severe respiratory illness at the end of December which resolved ~a week or so ago as well. Reports she has seen a GI in the past as well for her GERD, however does not take any acid reducing medications currently. She does have a hx of gastric sleeve. States she does occasionally drink bourbon & smokes cigars.        Objective:       Vitals:       Last 24hrs VS reviewed since prior progress note. Most recent are:    Vitals:    25 1108   BP: 114/78   Pulse: 61   Resp: 16   Temp: 98.1 °F (36.7 °C)   SpO2: 98%     SpO2 Readings from Last 6 Encounters:   25 98%   23 97%   23 98%   23 97%        No intake or output data in the 24 hours ending 25 1432     Exam:     Physical Exam:    Gen:  Well-developed, well-nourished, in no acute distress  HEENT:  Pink conjunctivae, hearing intact to voice, moist mucous membranes  Resp:  No accessory muscle use, diminished in BLLL without wheezes rales or rhonchi  Card:  S1, S2 without thrills, bruits or peripheral edema  Abd:  Soft, non-tender, non-distended,  normoactive bowel sounds are present, no palpable organomegaly and no detectable hernias  Musc:  No cyanosis or clubbing  Skin:  No rashes or ulcers, skin turgor is good  Neuro:  Follows commands, no focal deficits noted  Psych:  Good insight, oriented to person, place and time, alert    Medications Reviewed: (see below)    Lab Data Reviewed: (see below)    ______________________________________________________________________    Medications:     Current Facility-Administered Medications   Medication Dose Route Frequency    pantoprazole (PROTONIX) tablet 40 mg  40 mg Oral QAM AC    aspirin EC tablet 81 mg  81 mg Oral Daily    insulin lispro (HUMALOG,ADMELOG) injection vial 0-8 Units  0-8 Units SubCUTAneous 4x Daily AC & HS    glucose chewable tablet 16 g  4 tablet Oral PRN    dextrose bolus 10% 125 mL  125 mL IntraVENous PRN    Or    dextrose bolus 10% 250 mL  250 mL IntraVENous PRN    glucagon injection 1 mg  1 mg SubCUTAneous PRN    dextrose 10 % infusion   IntraVENous Continuous PRN    calcium carbonate (TUMS) chewable tablet 500 mg  500 mg Oral TID PRN    ondansetron (ZOFRAN) injection 4 mg  4 mg IntraVENous Once    sodium chloride flush 0.9 % injection 5-40 mL  5-40 mL IntraVENous 2 times per day    sodium chloride flush 0.9 % injection 5-40 mL  5-40 mL IntraVENous PRN    0.9 % sodium chloride infusion   IntraVENous PRN    potassium chloride (KLOR-CON) extended release tablet 40 mEq  40 mEq Oral PRN    Or    potassium bicarb-citric acid (EFFER-K) effervescent tablet 40 mEq  40 mEq Oral PRN    Or    potassium chloride 10 mEq/100 mL IVPB (Peripheral Line)  10 mEq IntraVENous PRN    magnesium sulfate 2000 mg in 50 mL IVPB premix  2,000 mg IntraVENous PRN    enoxaparin (LOVENOX) injection 40 mg  40 mg SubCUTAneous Daily    ondansetron (ZOFRAN-ODT) disintegrating tablet 4 mg  4 mg Oral Q8H PRN    Or    ondansetron (ZOFRAN) injection 4 mg  4 mg IntraVENous Q6H PRN    polyethylene glycol (GLYCOLAX) packet 17 g  17 g

## 2025-01-25 NOTE — H&P
Hospitalist Admission Note    NAME: No Don   :  1965   MRN:  [id number]     Date/Time:  2025 9:35 PM    Patient PCP: Irasema Murcia MD    Please note that this dictation was completed with [employer], the computer voice recognition software.  Quite often unanticipated grammatical, syntax, homophones, and other interpretive errors are inadvertently transcribed by the computer software.  Please disregard these errors.  Please excuse any errors that have escaped final proofreading  ________________________________________________________________________    Given the patient's current clinical presentation, I have a high level of concern for decompensation if discharged from the emergency department.  Complex decision making was performed, which includes reviewing the patient's available past medical records, laboratory results, and x-ray films.       My assessment of this patient's clinical condition and my plan of care is as follows.    Assessment / Plan:    Chest pain, POA  Elevated troponin plateau, POA  New incomplete right bundle branch block, POA  --improved with GI cocktail and Carafate in the ER  -- Observation status, put on aspirin 81 mg and Protonix.  Tums as needed  --Cardiology consult, echocardiogram.  Check fasting lipid panel  --Outpatient GI evaluation for endoscopy if pain does not improve with Protonix    Diabetes  -- On Ozempic    History of gastric sleeve for weight loss  History of obstructive sleep apnea, resolved with weight loss    Medical Decision Making:   I have personally reviewed the radiographs, laboratory data in Epic and decisions and statements above are based partially on this personal interpretation.      Code Status: Full Code  DVT Prophylaxis: Lovenox  GI Prophylaxis: PPI         Subjective:   CHIEF COMPLAINT: \"Chest pain\"    HISTORY OF PRESENT ILLNESS:     No Don is a 59 y.o.  female with PMHx gastric sleeve for weight loss,  clear breath sounds without wheezes rales or rhonchi  Card: No murmurs, normal S1, S2 without thrills, bruits or peripheral edema  Abd:  Soft, non-tender, non-distended, normoactive bowel sounds are present, no palpable organomegaly and no detectable hernias  Lymph:  No cervical or inguinal adenopathy  Musc: No cyanosis or clubbing  Skin: No rashes or ulcers, skin turgor is good  Neuro:  Cranial nerves are grossly intact, no focal motor weakness, follows commands appropriately  Psych:  Good insight, oriented to person, place and time, alert          _______________________________________________________________________  Care Plan discussed with:    Comments   Patient y Discussed with patient in room. POC outlined and Questions answered    Family      RN x    Care Manager                    Consultant:  cabrera STOCKTON MD   _______________________________________________________________________  Recommended Disposition:   Home with Family y   HH/PT/OT/RN    SNF/LTC    SHRAVAN    ________________________________________________________________________  TOTAL TIME:  60 Minutes        Comments   >50% of visit spent in counseling and coordination of care  Chart reviewed  Discussion with patient and/or family and questions answered     LAB DATA REVIEWED:    Recent Results (from the past 12 hour(s))   EKG 12 Lead    Collection Time: 01/24/25  2:51 PM   Result Value Ref Range    Ventricular Rate 74 BPM    Atrial Rate 74 BPM    P-R Interval 146 ms    QRS Duration 100 ms    Q-T Interval 386 ms    QTc Calculation (Bazett) 428 ms    P Axis 59 degrees    R Axis 49 degrees    T Axis 53 degrees    Diagnosis       Normal sinus rhythm  Incomplete right bundle branch block  Borderline ECG  When compared with ECG of 20-MAR-2022 18:03,  Sinus rhythm is no longer with ventricular escape complexes     CBC with Auto Differential    Collection Time: 01/24/25  3:11 PM   Result Value Ref Range    WBC 8.8 3.6 - 11.0 K/uL    RBC 4.26 3.80 - 5.20 M/uL

## 2025-01-25 NOTE — DISCHARGE INSTRUCTIONS
HOSPITALIST DISCHARGE INSTRUCTIONS  NAME:  No Don   :  1965   MRN:  [id number]     Date/Time:  2025 5:08 PM    ADMIT DATE: 2025     DISCHARGE DATE: 2025     DISCHARGE DIAGNOSIS:  GERD vs possible coronary vasospasm    DISCHARGE INSTRUCTIONS:  Thank you for allowing us to participate in your care. Your discharging Hospitalist is SUKUMAR Bergman CNP. You were admitted for evaluation and treatment of the above.       MEDICATIONS:    It is important that you take the medication exactly as they are prescribed.   Keep your medication in the bottles provided by the pharmacist and keep a list of the medication names, dosages, and times to be taken in your wallet.   Do not take other medications without consulting your doctor.         If you experience any of the following symptoms then please call your primary care physician or return to the emergency room if you cannot get hold of your doctor:  Fever, chills, nausea, vomiting, diarrhea, change in mentation, falling, bleeding, shortness of breath    Follow Up:  Please call the below provider to arrange hospital follow up appointment      Bruce Miller MD  [address]  [address]  [facility] [id number] [phone]    Follow up in 2 week(s)      Irasema Murcia MD  [address]  [address]  [place] [address]  [phone]    Follow up in 2 week(s)        For questions regarding your Hospitalization or to contact the Hospital Medicine team, please call [phone].      Information obtained by :  I understand that if any problems occur once I am at home I am to contact my physician.    I understand and acknowledge receipt of the instructions indicated above.                                                                                                                                           Physician's or R.N.'s Signature                                                                  Date/Time

## 2025-01-25 NOTE — PROGRESS NOTES
Hospitalist Discharge Summary     Patient ID:  No Don  [id number]  59 y.o.  1965    Admit date: 1/24/2025    Discharge date and time: 1/25/2025    Admission Diagnoses: Nausea [R11.0]  Chest pain [R07.9]  Acute chest pain [R07.9]  Chest pain, unspecified type [R07.9]    Discharge Diagnoses:    Principal Problem:    Chest pain  Resolved Problems:    * No resolved hospital problems. *     Hospital Course:     No Don is a 59 y.o.  female with PMHx gastric sleeve for weight loss, cholecystectomy, obstructive sleep apnea resolved with weight loss presents with midsternal chest pain radiating into the middle of her back, along with right arm pain, associated with heartburn and burping.      Acute atypical chest pain  Elevated troponin plateau  Incomplete right bundle branch block, POA  Previously normal cardiac catheterization and normal stress MRI in 2022   Improved with GI cocktail and Carafate in the ER  Check fasting lipid panel-> reviewed, Total chl 204, .4  D/c on guideline directed moderate intensity statin; 10-year ASCVD risk 5.2%  Cardiology consult, appreciate assistance->   -CCTA: -No significant atherosclerotic disease. No coronary calcification.  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 Left dominant system.  -CAD RADS 0. Echo normal. No RWMA.  -Possibly mild Trop from vasospasm vs. Microvascular dz.   -Will start on low dose Isosorbide 30mg po daily.   -OK to DC home. Will setup OP followup.     GERD  Likely contributing factor to the above  -D/c with 2 week course of Protonix, then Pepcid/Tums PRN  -Imdur as above  -F/u w PCP     History of gastric sleeve for weight loss  Now on Ozempic  Current BMI 27.9  Last A1C 5.7 on 2023, recheck    1/25  D/c SSI & BG checks     Tobacco use  -Encourage cessation     History of obstructive sleep apnea, resolved with weight

## 2025-01-25 NOTE — CONSULTS
[facility] CARDIOLOGY                    Cardiology Care Note     [x]Initial Encounter     []Follow-up    Patient Name: No Don - :1965 - MRN:[id number]  Primary Cardiologist: [facility]  Consulting Cardiologist: Bruce Miller MD     Reason for encounter: Chest pain    HPI:   No Don is a 59 y.o. female with PMH significant for type II non-ST elevation MI in 2022, history of morbid obesity status post gastric sleeve and more recently on Ozempic.  She is admitted with symptoms of chest pain which she described as anterior chest wall pain radiating to the back aching in nature and worse with deep inspiration.  She also had radiation to the right arm.  On presentation her troponins were 70 and remained similar without any spike.  After getting GI cocktail her symptoms have improved.  No symptoms of shortness of breath, palpitations, lightheadedness or dizziness.      In 2022 she was admitted to [facility] for symptoms of chest pain and possible NSTEMI with a peak troponin of 92.  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  Since then she has had a cardiac MRI at [facility] which again confirmed retroaortic left circumflex and mild inferoapical ischemia on stress MRI. She was ivis      EKG at the time of presentation demonstrated normal sinus rhythm with incomplete right bundle branch block with normal ST and normal intervals.    Most recent HS troponins:  Recent Labs     25  1722 25  0101 25  0607   TROPHS 73* 71* 72*        Assessment and Plan     Chest pain: Atypical symptoms.  Minimally elevated troponin.  This is likely type II rising troponin.  Possibility of coronary vasospasm.  Previously normal cardiac catheterization and normal stress MRI in .  At home she takes aspirin 81

## 2025-01-25 NOTE — PLAN OF CARE
Problem: Chronic Conditions and Co-morbidities  Goal: Patient's chronic conditions and co-morbidity symptoms are monitored and maintained or improved  Outcome: Progressing     Problem: Discharge Planning  Goal: Discharge to home or other facility with appropriate resources  Outcome: Progressing     Problem: Pain  Goal: Verbalizes/displays adequate comfort level or baseline comfort level  Outcome: Progressing     Problem: Cardiovascular - Adult  Goal: Maintains optimal cardiac output and hemodynamic stability  Outcome: Progressing  Goal: Absence of cardiac dysrhythmias or at baseline  Outcome: Progressing     Problem: Gastrointestinal - Adult  Goal: Minimal or absence of nausea and vomiting  Outcome: Progressing     Problem: Metabolic/Fluid and Electrolytes - Adult  Goal: Electrolytes maintained within normal limits  Outcome: Progressing  Goal: Glucose maintained within prescribed range  Outcome: Progressing     Problem: Hematologic - Adult  Goal: Maintains hematologic stability  Outcome: Progressing

## 2025-01-25 NOTE — ED NOTES
TRANSFER - OUT REPORT:    Verbal report given to [facility] on No [facility]  being transferred to [facility] for routine progression of patient care       Report consisted of patient's Situation, Background, Assessment and   Recommendations(SBAR).     Information from the following report(s) Nurse Handoff Report, ED Encounter Summary, and ED SBAR was reviewed with the receiving nurse.    [place] Fall Assessment:    Presents to emergency department  because of falls (Syncope, seizure, or loss of consciousness): No  Age > 70: No  Altered Mental Status, Intoxication with alcohol or substance confusion (Disorientation, impaired judgment, poor safety awaremess, or inability to follow instructions): No  Impaired Mobility: Ambulates or transfers with assistive devices or assistance; Unable to ambulate or transer.: No             Lines:   Peripheral IV 01/24/25 Left Antecubital (Active)   Site Assessment Clean, dry & intact 01/24/25 1514   Line Status Blood return noted;Brisk blood return 01/24/25 1514   Phlebitis Assessment No symptoms 01/24/25 1514   Infiltration Assessment 0 01/24/25 1514   Alcohol Cap Used No 01/24/25 1514   Dressing Status New dressing applied 01/24/25 1514   Dressing Type Transparent 01/24/25 1514        Opportunity for questions and clarification was provided.      Patient transported with:  Monitor

## 2025-01-25 NOTE — ED NOTES
TRANSFER - OUT REPORT:    Verbal report given to David HURLEY on [facility]  being transferred to [facility] for routine progression of patient care       Report consisted of patient's Situation, Background, Assessment and   Recommendations(SBAR).     Information from the following report(s) Nurse Handoff Report was reviewed with the receiving nurse.    [place] Fall Assessment:    Presents to emergency department  because of falls (Syncope, seizure, or loss of consciousness): No  Age > 70: No  Altered Mental Status, Intoxication with alcohol or substance confusion (Disorientation, impaired judgment, poor safety awaremess, or inability to follow instructions): No  Impaired Mobility: Ambulates or transfers with assistive devices or assistance; Unable to ambulate or transer.: No             Lines:   Peripheral IV 01/24/25 Left Antecubital (Active)   Site Assessment Clean, dry & intact 01/24/25 1514   Line Status Blood return noted;Brisk blood return 01/24/25 1514   Phlebitis Assessment No symptoms 01/24/25 1514   Infiltration Assessment 0 01/24/25 1514   Alcohol Cap Used No 01/24/25 1514   Dressing Status New dressing applied 01/24/25 1514   Dressing Type Transparent 01/24/25 1514        Opportunity for questions and clarification was provided.      Patient transported with:  Monitor

## 2025-01-25 NOTE — ED PROVIDER NOTES
[facility] DEPARTMENT  EMERGENCY DEPARTMENT ENCOUNTER      Pt Name: No Don  MRN: [id number]  Birthdate 1965  Date of evaluation: 2025  Provider: Nydia Baez MD    CHIEF COMPLAINT       Chief Complaint   Patient presents with    Chest Pain    Nausea         HISTORY OF PRESENT ILLNESS    Nursing Triage Notes were reviewed.    HPI    No Don is a 59 y.o. female with a history of GERD, obesity, type 2 diabetes on Ozempic, prior cholecystectomy who presents to the emergency department for evaluation of chest pain.  Patient complains that she developed chest pain this morning around 8 AM with midsternal chest pain radiating to her back.  She reports that pain has been constant all day.  Complains of associated nausea, \"bad indigestion\", chest tightness, and frequent belching.  States that she has not taking any medications at home for treatment of this.  Denies taking any antacids at baseline.  States that she has been having regular bowel movements, but complains of some new constipation today and upper abdominal pain.  Reports she felt like she was breathing heavy prior to arrival, and complains of some pain with deep breathing in her chest that radiates to her back.  Denies any recent infectious symptoms with fevers, chills, upper respiratory symptoms, or vomiting.      PAST MEDICAL HISTORY     Past Medical History:   Diagnosis Date    Diabetes (HCC)          SURGICAL HISTORY       Past Surgical History:   Procedure Laterality Date     SECTION      x2    CHOLECYSTECTOMY, LAPAROSCOPIC  2018    HERNIA REPAIR      TONSILLECTOMY           CURRENT MEDICATIONS       Previous Medications    ASPIRIN 81 MG CHEWABLE TABLET    Take 1 tablet by mouth daily    CHOLECALCIFEROL 50 MCG (2000) TABS    Take by mouth every evening    DICYCLOMINE (BENTYL) 10 MG CAPSULE    Take by mouth as needed    MULTIPLE VITAMIN (DAILY VITES) TABS    Take 1

## 2025-01-26 LAB
EKG ATRIAL RATE: 68 BPM
EKG DIAGNOSIS: NORMAL
EKG P AXIS: 58 DEGREES
EKG P-R INTERVAL: 136 MS
EKG Q-T INTERVAL: 410 MS
EKG QRS DURATION: 96 MS
EKG QTC CALCULATION (BAZETT): 435 MS
EKG R AXIS: 54 DEGREES
EKG T AXIS: 58 DEGREES
EKG VENTRICULAR RATE: 68 BPM

## 2025-01-26 PROCEDURE — 93010 ELECTROCARDIOGRAM REPORT: CPT | Performed by: INTERNAL MEDICINE

## 2025-03-05 ENCOUNTER — OFFICE VISIT (OUTPATIENT)
Age: 60
End: 2025-03-05
Payer: COMMERCIAL

## 2025-03-05 VITALS
TEMPERATURE: 97.6 F | HEIGHT: 66 IN | BODY MASS INDEX: 27.77 KG/M2 | SYSTOLIC BLOOD PRESSURE: 92 MMHG | DIASTOLIC BLOOD PRESSURE: 60 MMHG | HEART RATE: 74 BPM | OXYGEN SATURATION: 98 % | WEIGHT: 172.8 LBS | RESPIRATION RATE: 16 BRPM

## 2025-03-05 DIAGNOSIS — I25.2 H/O NON-ST ELEVATION MYOCARDIAL INFARCTION (NSTEMI): ICD-10-CM

## 2025-03-05 DIAGNOSIS — G47.33 OSA (OBSTRUCTIVE SLEEP APNEA): ICD-10-CM

## 2025-03-05 DIAGNOSIS — E66.3 OVERWEIGHT (BMI 25.0-29.9): Primary | ICD-10-CM

## 2025-03-05 DIAGNOSIS — E11.9 CONTROLLED TYPE 2 DIABETES MELLITUS WITHOUT COMPLICATION, WITHOUT LONG-TERM CURRENT USE OF INSULIN (HCC): ICD-10-CM

## 2025-03-05 PROCEDURE — 3044F HG A1C LEVEL LT 7.0%: CPT | Performed by: FAMILY MEDICINE

## 2025-03-05 PROCEDURE — 99213 OFFICE O/P EST LOW 20 MIN: CPT | Performed by: FAMILY MEDICINE

## 2025-03-05 RX ORDER — ESZOPICLONE 2 MG/1
2 TABLET, FILM COATED ORAL NIGHTLY
COMMUNITY
Start: 2025-01-28

## 2025-03-05 RX ORDER — ATORVASTATIN CALCIUM 40 MG/1
40 TABLET, FILM COATED ORAL DAILY
COMMUNITY
Start: 2025-01-31

## 2025-03-05 RX ORDER — NITROGLYCERIN 0.4 MG/1
TABLET SUBLINGUAL
COMMUNITY

## 2025-03-05 RX ORDER — METOPROLOL TARTRATE 25 MG/1
12.5 TABLET, FILM COATED ORAL 2 TIMES DAILY
COMMUNITY
Start: 2025-01-31 | End: 2026-01-31

## 2025-03-05 ASSESSMENT — PATIENT HEALTH QUESTIONNAIRE - PHQ9
1. LITTLE INTEREST OR PLEASURE IN DOING THINGS: NOT AT ALL
SUM OF ALL RESPONSES TO PHQ QUESTIONS 1-9: 0
2. FEELING DOWN, DEPRESSED OR HOPELESS: NOT AT ALL
SUM OF ALL RESPONSES TO PHQ QUESTIONS 1-9: 0

## 2025-03-05 NOTE — PROGRESS NOTES
Identified pt with two pt identifiers (name and ). Reviewed chart in preparation for visit and have obtained necessary documentation.    No Don is a 59 y.o. female  Chief Complaint   Patient presents with    Weight Management     Restart     BP 92/60 (Site: Left Upper Arm, Position: Sitting, Cuff Size: Large Adult)   Pulse 74   Temp 97.6 °F (36.4 °C) (Oral)   Resp 16   Ht 1.676 m (5' 6\")   Wt 78.4 kg (172 lb 12.8 oz)   SpO2 98%   BMI 27.89 kg/m²     1. Have you been to the ER, urgent care clinic since your last visit?  Hospitalized since your last visit?no    2. Have you seen or consulted any other health care providers outside of the [facility] since your last visit?  Include any pap smears or colon screening. Yes [facility]  
without long-term current use of insulin (HCC), EMMETT (obstructive sleep apnea), and H/O non-ST elevation myocardial infarction (NSTEMI) were also pertinent to this visit.  The patient verbalizes understanding and agrees with the plan of care

## 2025-04-28 ENCOUNTER — TELEPHONE (OUTPATIENT)
Age: 60
End: 2025-04-28

## (undated) DEVICE — BLADELESS OPTICAL TROCAR WITH FIXATION CANNULA: Brand: VERSAONE

## (undated) DEVICE — CLICKLINE SCISSORS INSERT: Brand: CLICKLINE

## (undated) DEVICE — DEVON™ KNEE AND BODY STRAP 60" X 3" (1.5 M X 7.6 CM): Brand: DEVON

## (undated) DEVICE — GLIDESHEATH SLENDER STAINLESS STEEL KIT: Brand: GLIDESHEATH SLENDER

## (undated) DEVICE — KIT MFLD ISOLATN NACL CNTRST PRT TBNG SPIK W/ PRSS TRNSDUC

## (undated) DEVICE — APPLIER LIG CLP 5MM CONTAIN 16 TI CLP DISP ENDO CLP

## (undated) DEVICE — SPECIMEN RETRIEVAL POUCH: Brand: ENDO CATCH GOLD

## (undated) DEVICE — (D)PREP SKN CHLRAPRP APPL 26ML -- CONVERT TO ITEM 371833

## (undated) DEVICE — KIT HND CTRL 3 W STPCOCK ROT END 54IN PREM HI PRSS TBNG AT

## (undated) DEVICE — PACK PROCEDURE SURG HRT CATH

## (undated) DEVICE — SUTURE SZ 0 27IN 5/8 CIR UR-6  TAPER PT VIOLET ABSRB VICRYL J603H

## (undated) DEVICE — TROCAR SITE CLOSURE DEVICE: Brand: ENDO CLOSE

## (undated) DEVICE — REM POLYHESIVE ADULT PATIENT RETURN ELECTRODE: Brand: VALLEYLAB

## (undated) DEVICE — INFECTION CONTROL KIT SYS

## (undated) DEVICE — UNIVERSAL FIXATION CANNULA: Brand: VERSAONE

## (undated) DEVICE — Device

## (undated) DEVICE — SUTURE MCRYL SZ 4-0 L27IN ABSRB UD L19MM PS-2 1/2 CIR PRIM Y426H

## (undated) DEVICE — 3000CC GUARDIAN II: Brand: GUARDIAN

## (undated) DEVICE — DERMABOND SKIN ADH 0.7ML -- DERMABOND ADVANCED 12/BX

## (undated) DEVICE — WASTE KIT - ST MARY: Brand: MEDLINE INDUSTRIES, INC.

## (undated) DEVICE — BLADELESS OPTICAL TROCAR WITH FIXATION CANNULA: Brand: VERSAPORT

## (undated) DEVICE — STERILE POLYISOPRENE POWDER-FREE SURGICAL GLOVES WITH EMOLLIENT COATING: Brand: PROTEXIS

## (undated) DEVICE — BAND COMPR L24CM REG CLR PLAS HEMSTAT EXT HK AND LOOP RETEN

## (undated) DEVICE — CATH 5F 100CM JR40 -- DXTERITY

## (undated) DEVICE — CATH 5F 100CM JL35 -- DXTERITY

## (undated) DEVICE — SOLUTION IV 1000ML 0.9% SOD CHL

## (undated) DEVICE — DRAPE,REIN 53X77,STERILE: Brand: MEDLINE

## (undated) DEVICE — TUBING INSUFLTN 10FT LUER -- CONVERT TO ITEM 368568

## (undated) DEVICE — KIT MED IMAG CNTRST AGNT W/ IOPAMIDOL REUSE

## (undated) DEVICE — KENDALL SCD EXPRESS SLEEVES, KNEE LENGTH, MEDIUM: Brand: KENDALL SCD

## (undated) DEVICE — E-Z CLEAN, PTFE COATED, ELECTROSURGICAL LAPAROSCOPIC ELECTRODE, L-HOOK, 33 CM., SINGLE-USE, FOR USE WITH HAND CONTROL PENCIL: Brand: MEGADYNE

## (undated) DEVICE — CATHETER DIAG 5FR L110CM PIG CRV SZ 6 SIDE H DBL BRAID WIRE

## (undated) DEVICE — FILTER SMK EVAC FLO CLR MEGADYNE

## (undated) DEVICE — NEEDLE HYPO 22GA L1.5IN BLK S STL HUB POLYPR SHLD REG BVL

## (undated) DEVICE — SPECIAL PROCEDURE DRAPE 32" X 34": Brand: SPECIAL PROCEDURE DRAPE

## (undated) DEVICE — APPLIER LIG CLP L13IN 10MM PSTL GRP CONTAIN 15 TI L CLP

## (undated) DEVICE — SURGICAL PROCEDURE KIT GEN LAPAROSCOPY LF

## (undated) DEVICE — ANGIOGRAPHY KIT